# Patient Record
Sex: FEMALE | Race: WHITE | NOT HISPANIC OR LATINO | Employment: OTHER
[De-identification: names, ages, dates, MRNs, and addresses within clinical notes are randomized per-mention and may not be internally consistent; named-entity substitution may affect disease eponyms.]

---

## 2019-12-08 ENCOUNTER — HEALTH MAINTENANCE LETTER (OUTPATIENT)
Age: 79
End: 2019-12-08

## 2020-03-15 ENCOUNTER — HEALTH MAINTENANCE LETTER (OUTPATIENT)
Age: 80
End: 2020-03-15

## 2021-01-10 ENCOUNTER — HEALTH MAINTENANCE LETTER (OUTPATIENT)
Age: 81
End: 2021-01-10

## 2021-04-14 ENCOUNTER — TELEPHONE (OUTPATIENT)
Dept: FAMILY MEDICINE | Facility: CLINIC | Age: 81
End: 2021-04-14

## 2021-04-14 NOTE — TELEPHONE ENCOUNTER
"Pt said she has some different kind of chest pain,discomfort. Also c/o some dizziness.     Pt has emphysema. Reviewed chart with pt. Was seen by cardiology last in .   Pt denies chest pain, is SOB from \"my lungs\", denies tachycardia.     Fam hx: mother  of MI at age 98!  Pts son had MI at age 40 (his father had CAD)  Noted CT chest showed atherosclerosis- pt stated that was when she referred to cardiology in 2017- no follow up, statin recommended- but pt took for a short time and stopped.   Pt stated she is just not  feeling quite right. - does not think it is her lungs, its different. She has fatigue, dizziness.    Pt has not checked blood pressure with dizziness or regularly.      2017: Cardiology consult: Dr Osvaldo Cotter: in care everywhere.      CORONARY CTA, 2017    INDICATIONS:  Chest pain, evaluate.  Risk Factors: FH: yes.  HTN: yes.    Hyperlipidemia: no.  Diabetes: no.  Obesity: mild.    PLAQUE TYPE:  Hard: Calcium Score = 333, 79th percentile for matched age   and sex.     CORONARY ANATOMY:  (Right Dominant)    LEFT MAIN:  There is moderate distal left main calcification.  There is no   stenosis.    LEFT ANTERIOR DESCENDING:  Diffuse calcification with no stenosis.    FIRST DIAGONAL:  No stenoses.     SECOND DIAGONAL:  No stenoses.       CIRCUMFLEX:  Nondominant.  No stenosis.    FIRST OBTUSE MARGINAL:  No stenoses.     SECOND OBTUSE MARGINAL:  No stenoses.       RIGHT CORONARY ARTERY:  Dominant.  Diffuse calcification.  Careful review   of the right coronary artery shows a mild to moderate mid stenosis before   the right ventricular branch.      AORTA:  Proximal ascending aorta, mid-through distal descending thoracic   aorta is normal.    PERICARDIUM:  Normal thickness and without an effusion.     2015: 48 hour holter- was WNL  2015: Stress echo: - was WNL  2013: Echo- WNL    Pt did see pcp on 21- communicated these issues. Labs done, chest xray- see Care everywhere ([facility])- no " changes in plan of care. Pt scheduling with cardiology for her concerns.     Scheduled first available with Dr Mansfield 5/5/21. Pt stated she was okay waiting for this appt.   Discussed symptoms to watch for: if chest pain, back pain, JEAN BAPTISTE, tachycardia- especially with exertion she should call 911, go to ED.   Pt agreed and verbalized understanding

## 2021-04-14 NOTE — TELEPHONE ENCOUNTER
Pt reports she would like an appt with a cardiologist due to some chest and light headedness. Please call her back at [phone] to schedule

## 2021-04-15 ENCOUNTER — TELEPHONE (OUTPATIENT)
Dept: CARDIOLOGY | Facility: CLINIC | Age: 81
End: 2021-04-15

## 2021-04-15 DIAGNOSIS — I25.10 CORONARY ARTERY CALCIFICATION SEEN ON CT SCAN: Primary | ICD-10-CM

## 2021-04-15 DIAGNOSIS — R07.9 CHEST PAIN: ICD-10-CM

## 2021-04-15 DIAGNOSIS — I10 HTN (HYPERTENSION): ICD-10-CM

## 2021-04-15 NOTE — TELEPHONE ENCOUNTER
Reviewed hx with Dr Mansfield. Would like an echocardiogram prior to clinic consult.    Order placed. Will call to schedule

## 2021-04-16 NOTE — TELEPHONE ENCOUNTER
Left a message for patient to call back to schedule Echo prior to Dr. Mansfield appointment 5/5/2021.    YARELI Rothman

## 2021-04-30 ENCOUNTER — ANCILLARY PROCEDURE (OUTPATIENT)
Dept: CARDIOLOGY | Facility: CLINIC | Age: 81
End: 2021-04-30
Payer: MEDICARE

## 2021-04-30 DIAGNOSIS — I10 HTN (HYPERTENSION): ICD-10-CM

## 2021-04-30 DIAGNOSIS — I25.10 CORONARY ARTERY CALCIFICATION SEEN ON CT SCAN: ICD-10-CM

## 2021-04-30 DIAGNOSIS — R07.9 CHEST PAIN: ICD-10-CM

## 2021-04-30 PROCEDURE — 93306 TTE W/DOPPLER COMPLETE: CPT | Performed by: INTERNAL MEDICINE

## 2021-05-05 ENCOUNTER — OFFICE VISIT (OUTPATIENT)
Dept: CARDIOLOGY | Facility: CLINIC | Age: 81
End: 2021-05-05
Payer: MEDICARE

## 2021-05-05 VITALS
BODY MASS INDEX: 27.88 KG/M2 | DIASTOLIC BLOOD PRESSURE: 75 MMHG | OXYGEN SATURATION: 98 % | HEART RATE: 70 BPM | SYSTOLIC BLOOD PRESSURE: 136 MMHG | WEIGHT: 150 LBS

## 2021-05-05 DIAGNOSIS — J43.9 PULMONARY EMPHYSEMA, UNSPECIFIED EMPHYSEMA TYPE (H): ICD-10-CM

## 2021-05-05 DIAGNOSIS — Z87.891 FORMER SMOKER: ICD-10-CM

## 2021-05-05 DIAGNOSIS — R07.89 CHEST DISCOMFORT: Primary | ICD-10-CM

## 2021-05-05 PROCEDURE — 99205 OFFICE O/P NEW HI 60 MIN: CPT | Performed by: INTERNAL MEDICINE

## 2021-05-05 PROCEDURE — 93000 ELECTROCARDIOGRAM COMPLETE: CPT | Performed by: INTERNAL MEDICINE

## 2021-05-05 RX ORDER — CELECOXIB 200 MG/1
200 CAPSULE ORAL
COMMUNITY
Start: 2019-09-17 | End: 2022-05-19

## 2021-05-05 RX ORDER — PREDNISONE 5 MG/1
TABLET ORAL
COMMUNITY
Start: 2021-04-06 | End: 2022-01-10

## 2021-05-05 RX ORDER — TRAMADOL HYDROCHLORIDE 50 MG/1
TABLET ORAL
COMMUNITY
Start: 2021-04-05 | End: 2022-12-19

## 2021-05-05 RX ORDER — BUDESONIDE 0.5 MG/2ML
0.5 INHALANT ORAL
COMMUNITY
Start: 2019-10-01 | End: 2022-01-10

## 2021-05-05 NOTE — NURSING NOTE
"Chief Complaint   Patient presents with     New Patient     Dr Mansfield , new pt. Hx of COPD, intermittent CP, 2017- CTA- Diffuse ds RCA, moderate Left Main. Hx hypertension, hypothyroid.      Chest Pain     Hypertension       Initial BP (!) 160/83 (BP Location: Left arm, Patient Position: Chair, Cuff Size: Adult Regular)   Pulse 76   Wt 68 kg (150 lb)   SpO2 98%   BMI 27.88 kg/m   Estimated body mass index is 27.88 kg/m  as calculated from the following:    Height as of 6/15/16: 1.562 m (5' 1.5\").    Weight as of this encounter: 68 kg (150 lb)..  BP completed using cuff size: regular    Cindy Fam MA  "

## 2021-05-05 NOTE — LETTER
5/5/2021      RE: Yvonne Maria  [address] [address]       Dear Colleague,    Thank you for the opportunity to participate in the care of your patient, Yvonne Maria, at the [facility] [facility]. Please see a copy of my visit note below.    Referring provider: Self-referred    Chief complaint: Chest discomfort    HPI: Ms. Yvonne Maria is a 80 year old  female with PMH significant for    -COPD (panlobular emphysema) on oxygen at night and inhalers  -Pulmonary nodules  -Hypertension, well controlled  -Probable polymyalgia rheumatica  -Ménière's disease  -Celiac disease  -Former smoker    Patient is being seen today for chest discomfort over the last 6 to 8 months.  Chest discomfort is around the epigastric and lower midsternal chest.  The discomfort radiates to her back.  Symptom occurs both at rest and with exertion mainly with stairs.  She tells me that she feels the discomfort while sitting in clinic right now.  It is mild.  Does not happen every day.  Sometimes associated with sweating but denies nausea.  Last for 20 to 30 minutes.  She tells me that she herself has decided to see cardiology, GI and pulmonology for this particular symptom.  She is scheduled for upper endoscopy on 5/18.    Patient was evaluated in cardiology at [facility] in 2017 for similar symptoms.  She had CT coronary angiogram in 2017 which showed elevated coronary artery calcium score at 333 with no obstructive CAD.  She was started on statin (she recalls the dose was 40 mg but cannot recall the name) which she could not tolerate and stopped.  Patient expressed her unpleasant memory about this because she tells me that the statin was never discussed with her before she was started on this treatment.    Patient has history of Ménière's and she tells me that she was started on triamterene hydrochlorothiazide initially 2 tablets a day.   She has made significant lifestyle changes.  She decreased salt intake and she was able to lower the dose of the medication to once daily now.    She has history of hiatal hernia surgery in 2019. She has COPD currently on inhalers and oxygen at night. Patient has seen rheumatology in the past for suspicious temporal arteritis.  Temporal artery biopsy was unremarkable.  She was on steroids for polymyalgia rheumatica. She tells me that she has celiac disease currently well controlled with diet.    No history of diabetes.  She has hyperlipidemia currently not on treatment.      Patient has 27-pack-year history of smoking.  Quit date 1986.    Current medications are triamterene hydrochlorothiazide 1 tablet a day, aspirin 81 mg, potassium supplement, prednisone 5 mg once daily.    Medications, personal, family, and social history reviewed with patient and revised.    PAST MEDICAL HISTORY:  History reviewed. No pertinent past medical history.    CURRENT MEDICATIONS:  Current Outpatient Medications   Medication Sig Dispense Refill     albuterol (PROAIR HFA, PROVENTIL HFA, VENTOLIN HFA) 108 (90 BASE) MCG/ACT inhaler Inhale 2 puffs into the lungs every 6 hours as needed for shortness of breath / dyspnea or wheezing 1 Inhaler 1     Albuterol Sulfate 108 (90 BASE) MCG/ACT AEPB Inhale 1-2 puffs into the lungs every 4 hours as needed       aspirin 81 MG tablet Take 81 mg by mouth daily       cyanocobalamin (VITAMIN B12) 1000 MCG/ML injection Inject 1 mL into the muscle every 30 days       DULoxetine (CYMBALTA) 30 MG capsule Take 1 capsule (30 mg) by mouth 2 times daily 180 capsule 1     fluticasone - vilanterol (BREO ELLIPTA) 100-25 MCG/INH oral inhaler Inhale 1 puff into the lungs daily       guaiFENesin-codeine (ROBITUSSIN AC) 100-10 MG/5ML SOLN Take 5 mLs by mouth every 6 hours as needed for cough 120 mL 0     hydrocortisone valerate (WEST-YVROSE) 0.2 % ointment Apply topically as needed       levothyroxine  (SYNTHROID,LEVOTHROID) 100 MCG tablet Take 1 tablet (100 mcg) by mouth daily 90 tablet 3     potassium chloride SA (K-DUR,KLOR-CON M) 20 MEQ tablet   3     ranitidine (ZANTAC) 75 MG tablet Take 75 mg by mouth daily       triamterene-hydrochlorothiazide (MAXZIDE-25) 37.5-25 MG per tablet Take 1 tablet by mouth daily 90 tablet 3       PAST SURGICAL HISTORY:  No past surgical history on file.    ALLERGIES:     Allergies   Allergen Reactions     Lyrica [Pregabalin] Swelling     Tramadol        FAMILY HISTORY:  Family History   Problem Relation Age of Onset     Myocardial Infarction Father      Heart Disease Maternal Grandmother      Lymphoma Maternal Grandfather      Heart Disease Paternal Grandmother      Myocardial Infarction Son          SOCIAL HISTORY:  Social History     Tobacco Use     Smoking status: Former Smoker     Types: Cigarettes     Quit date: 3/28/1986     Years since quittin.1     Smokeless tobacco: Never Used   Substance Use Topics     Alcohol use: Yes     Alcohol/week: 0.0 standard drinks     Comment: Wine 5-7 glasses per week     Drug use: No       ROS:   Constitutional: No fever, chills, or sweats. Weight stable.   ENT: No visual disturbance, ear ache, epistaxis, sore throat.   Cardiovascular: As per HPI.   Respiratory: No cough, hemoptysis.    GI: No nausea, vomiting, hematemesis, melena, or hematochezia.   : No hematuria.   Integument: Negative.   Psychiatric: Negative.   Hematologic:  No easy bruising, no easy bleeding.  Neuro: Negative.   Endocrinology: No significant heat or cold intolerance   Musculoskeletal: No myalgia.    Exam:  /75   Pulse 70   Wt 68 kg (150 lb)   SpO2 98%   BMI 27.88 kg/m    GENERAL APPEARANCE: alert and no distress  HEENT: no icterus, no central cyanosis  LYMPH/NECK: no adenopathy, no asymmetry, JVP not elevated, no carotid bruits.  RESPIRATORY: lungs clear to auscultation - no rales, rhonchi or wheezes, no use of accessory muscles, no retractions,  respirations are unlabored, normal respiratory rate  CARDIOVASCULAR: regular rhythm, normal S1, S2, no S3 or S4 and no murmur, click or rub, precordium quiet with normal PMI.  GI: soft, non tender  EXTREMITIES: peripheral pulses normal, no edema  NEURO: alert, normal speech,and affect  VASC: Radial, dorsalis pedis and posterior tibialis pulses are normal in volumes and symmetric bilaterally.   SKIN: no ecchymoses, no rashes     I have reviewed the labs and personally reviewed the imaging below and made my comment in the assessment and plan.    Labs:  CBC RESULTS:   Lab Results   Component Value Date    WBC 10.9 06/13/2016    RBC 4.69 06/13/2016    HGB 13.8 06/13/2016    HCT 41.1 06/13/2016    MCV 88 06/13/2016    MCH 29.4 06/13/2016    MCHC 33.6 06/13/2016    RDW 14.9 06/13/2016     06/13/2016       BMP RESULTS:  Lab Results   Component Value Date     05/02/2016    POTASSIUM 3.5 05/02/2016    CHLORIDE 97 05/02/2016    CO2 30 05/02/2016    ANIONGAP 9 05/02/2016     (H) 05/02/2016    BUN 15 05/02/2016    CR 0.89 05/02/2016    GFRESTIMATED 62 05/02/2016    GFRESTBLACK 75 05/02/2016    ARTURO 9.3 05/02/2016     CT coronary angiogram 8/9/2017 [facility]    PLAQUE TYPE:  Hard: Calcium Score = 333, 79th percentile for matched age   and sex.     SCAN QUALITY: Good.    FINDINGS:    CORONARY ANATOMY:  (Right Dominant)    LEFT MAIN:  There is moderate distal left main calcification.  There is no   stenosis.    LEFT ANTERIOR DESCENDING:  Diffuse calcification with no stenosis.    FIRST DIAGONAL:  No stenoses.     SECOND DIAGONAL:  No stenoses.       CIRCUMFLEX:  Nondominant.  No stenosis.    FIRST OBTUSE MARGINAL:  No stenoses.     SECOND OBTUSE MARGINAL:  No stenoses.       RIGHT CORONARY ARTERY:  Dominant.  Diffuse calcification.  Careful review   of the right coronary artery shows a mild to moderate mid stenosis before   the right ventricular branch.      AORTA:  Proximal ascending aorta, mid-through  distal descending thoracic   aorta is normal.    PERICARDIUM:  Normal thickness and without an effusion.    Exercise Stress echocardiogram [facility] 5/16/2013   1. LV function is normal. The visually estimated ejection fraction is 60%.        2. No obvious wall motion abnormalities, however endocardial definition is       limited.                                                                           3. Normal right ventricular size and systolic function.                           4. No hemodynamically significant valvular disease.                               5. No evidence of pulmonary hypertension. The estimated pulmonary artery         systolic pressure is normal at 20.6 mmHg plus right atrial pressure.               6. No pericardial effusion.       EKG personally reviewed in clinic sinus rhythm otherwise normal.    Assessment and Plan:     #Exertional and nonexertional chest discomfort over the last 6 months radiating to back  #Former smoker  #Elevated coronary artery calcium score with nonobstructive CAD (CTA coronary angiogram 2017 result available in Care Everywhere)  -I discussed with the patient stress test versus repeating CT coronary angiogram.  She prefers to repeat CT coronary angiogram to make sure she has no obstructive coronary disease leading to her current symptoms.  I will repeat her labs including BMP and lipid today.  Patient is aware of contrast related side effects including allergies and contrast-induced nephropathy.  She is agreeable to proceed.  -Continue aspirin 81 mg daily  -Was not able to tolerate statins in the past.  Patient is willing to try a different statin.  Will await results of current CT angiogram.    #History of hiatal hernia surgery in 2019  -Scheduled for upper endoscopy 5/18    #COPD on home oxygen and inhalers  -Patient requests referral to pulmonology to establish care  -Referral placed    Return to clinic pending CTA result.    No medication changes  today.    Total time spent today for this visit is 60 minutes including precharting, face-to-face clinic visit, review of labs/imaging, review of outside hospital medical records and medical documentation.    Please donot hesitate to contact me if you have any questions or concerns. Again, thank you for allowing me to participate in the care of your patient.    Rachana QUINN MD  [facility] Division of Cardiology  Pager [phone]    Addendum note 5/6/2021:  Labs showed CKD stage III, mild elevated LDL at 121 and normal CBC.  We will proceed with CT coronary angiogram.         Please do not hesitate to contact me if you have any questions/concerns.     Sincerely,     Rachana Quinn MD

## 2021-05-05 NOTE — PROGRESS NOTES
Referring provider: Self-referred    Chief complaint: Chest discomfort    HPI: Ms. Yvonne Maria is a 80 year old  female with PMH significant for    -COPD (panlobular emphysema) on oxygen at night and inhalers  -Pulmonary nodules  -Hypertension, well controlled  -Probable polymyalgia rheumatica  -Ménière's disease  -Celiac disease  -Former smoker    Patient is being seen today for chest discomfort over the last 6 to 8 months.  Chest discomfort is around the epigastric and lower midsternal chest.  The discomfort radiates to her back.  Symptom occurs both at rest and with exertion mainly with stairs.  She tells me that she feels the discomfort while sitting in clinic right now.  It is mild.  Does not happen every day.  Sometimes associated with sweating but denies nausea.  Last for 20 to 30 minutes.  She tells me that she herself has decided to see cardiology, GI and pulmonology for this particular symptom.  She is scheduled for upper endoscopy on 5/18.    Patient was evaluated in cardiology at [facility] in 2017 for similar symptoms.  She had CT coronary angiogram in 2017 which showed elevated coronary artery calcium score at 333 with no obstructive CAD.  She was started on statin (she recalls the dose was 40 mg but cannot recall the name) which she could not tolerate and stopped.  Patient expressed her unpleasant memory about this because she tells me that the statin was never discussed with her before she was started on this treatment.    Patient has history of Ménière's and she tells me that she was started on triamterene hydrochlorothiazide initially 2 tablets a day.  She has made significant lifestyle changes.  She decreased salt intake and she was able to lower the dose of the medication to once daily now.    She has history of hiatal hernia surgery in 2019. She has COPD currently on inhalers and oxygen at night. Patient has seen rheumatology in the past for suspicious temporal arteritis.  Temporal  artery biopsy was unremarkable.  She was on steroids for polymyalgia rheumatica. She tells me that she has celiac disease currently well controlled with diet.    No history of diabetes.  She has hyperlipidemia currently not on treatment.      Patient has 27-pack-year history of smoking.  Quit date 1986.    Current medications are triamterene hydrochlorothiazide 1 tablet a day, aspirin 81 mg, potassium supplement, prednisone 5 mg once daily.    Medications, personal, family, and social history reviewed with patient and revised.    PAST MEDICAL HISTORY:  History reviewed. No pertinent past medical history.    CURRENT MEDICATIONS:  Current Outpatient Medications   Medication Sig Dispense Refill     albuterol (PROAIR HFA, PROVENTIL HFA, VENTOLIN HFA) 108 (90 BASE) MCG/ACT inhaler Inhale 2 puffs into the lungs every 6 hours as needed for shortness of breath / dyspnea or wheezing 1 Inhaler 1     Albuterol Sulfate 108 (90 BASE) MCG/ACT AEPB Inhale 1-2 puffs into the lungs every 4 hours as needed       aspirin 81 MG tablet Take 81 mg by mouth daily       cyanocobalamin (VITAMIN B12) 1000 MCG/ML injection Inject 1 mL into the muscle every 30 days       DULoxetine (CYMBALTA) 30 MG capsule Take 1 capsule (30 mg) by mouth 2 times daily 180 capsule 1     fluticasone - vilanterol (BREO ELLIPTA) 100-25 MCG/INH oral inhaler Inhale 1 puff into the lungs daily       guaiFENesin-codeine (ROBITUSSIN AC) 100-10 MG/5ML SOLN Take 5 mLs by mouth every 6 hours as needed for cough 120 mL 0     hydrocortisone valerate (WEST-YVROSE) 0.2 % ointment Apply topically as needed       levothyroxine (SYNTHROID,LEVOTHROID) 100 MCG tablet Take 1 tablet (100 mcg) by mouth daily 90 tablet 3     potassium chloride SA (K-DUR,KLOR-CON M) 20 MEQ tablet   3     ranitidine (ZANTAC) 75 MG tablet Take 75 mg by mouth daily       triamterene-hydrochlorothiazide (MAXZIDE-25) 37.5-25 MG per tablet Take 1 tablet by mouth daily 90 tablet 3       PAST SURGICAL  HISTORY:  No past surgical history on file.    ALLERGIES:     Allergies   Allergen Reactions     Lyrica [Pregabalin] Swelling     Tramadol        FAMILY HISTORY:  Family History   Problem Relation Age of Onset     Myocardial Infarction Father      Heart Disease Maternal Grandmother      Lymphoma Maternal Grandfather      Heart Disease Paternal Grandmother      Myocardial Infarction Son          SOCIAL HISTORY:  Social History     Tobacco Use     Smoking status: Former Smoker     Types: Cigarettes     Quit date: 3/28/1986     Years since quittin.1     Smokeless tobacco: Never Used   Substance Use Topics     Alcohol use: Yes     Alcohol/week: 0.0 standard drinks     Comment: Wine 5-7 glasses per week     Drug use: No       ROS:   Constitutional: No fever, chills, or sweats. Weight stable.   ENT: No visual disturbance, ear ache, epistaxis, sore throat.   Cardiovascular: As per HPI.   Respiratory: No cough, hemoptysis.    GI: No nausea, vomiting, hematemesis, melena, or hematochezia.   : No hematuria.   Integument: Negative.   Psychiatric: Negative.   Hematologic:  No easy bruising, no easy bleeding.  Neuro: Negative.   Endocrinology: No significant heat or cold intolerance   Musculoskeletal: No myalgia.    Exam:  /75   Pulse 70   Wt 68 kg (150 lb)   SpO2 98%   BMI 27.88 kg/m    GENERAL APPEARANCE: alert and no distress  HEENT: no icterus, no central cyanosis  LYMPH/NECK: no adenopathy, no asymmetry, JVP not elevated, no carotid bruits.  RESPIRATORY: lungs clear to auscultation - no rales, rhonchi or wheezes, no use of accessory muscles, no retractions, respirations are unlabored, normal respiratory rate  CARDIOVASCULAR: regular rhythm, normal S1, S2, no S3 or S4 and no murmur, click or rub, precordium quiet with normal PMI.  GI: soft, non tender  EXTREMITIES: peripheral pulses normal, no edema  NEURO: alert, normal speech,and affect  VASC: Radial, dorsalis pedis and posterior tibialis pulses are normal  in volumes and symmetric bilaterally.   SKIN: no ecchymoses, no rashes     I have reviewed the labs and personally reviewed the imaging below and made my comment in the assessment and plan.    Labs:  CBC RESULTS:   Lab Results   Component Value Date    WBC 10.9 06/13/2016    RBC 4.69 06/13/2016    HGB 13.8 06/13/2016    HCT 41.1 06/13/2016    MCV 88 06/13/2016    MCH 29.4 06/13/2016    MCHC 33.6 06/13/2016    RDW 14.9 06/13/2016     06/13/2016       BMP RESULTS:  Lab Results   Component Value Date     05/02/2016    POTASSIUM 3.5 05/02/2016    CHLORIDE 97 05/02/2016    CO2 30 05/02/2016    ANIONGAP 9 05/02/2016     (H) 05/02/2016    BUN 15 05/02/2016    CR 0.89 05/02/2016    GFRESTIMATED 62 05/02/2016    GFRESTBLACK 75 05/02/2016    ARTURO 9.3 05/02/2016     CT coronary angiogram 8/9/2017 [employer]    PLAQUE TYPE:  Hard: Calcium Score = 333, 79th percentile for matched age   and sex.     SCAN QUALITY: Good.    FINDINGS:    CORONARY ANATOMY:  (Right Dominant)    LEFT MAIN:  There is moderate distal left main calcification.  There is no   stenosis.    LEFT ANTERIOR DESCENDING:  Diffuse calcification with no stenosis.    FIRST DIAGONAL:  No stenoses.     SECOND DIAGONAL:  No stenoses.       CIRCUMFLEX:  Nondominant.  No stenosis.    FIRST OBTUSE MARGINAL:  No stenoses.     SECOND OBTUSE MARGINAL:  No stenoses.       RIGHT CORONARY ARTERY:  Dominant.  Diffuse calcification.  Careful review   of the right coronary artery shows a mild to moderate mid stenosis before   the right ventricular branch.      AORTA:  Proximal ascending aorta, mid-through distal descending thoracic   aorta is normal.    PERICARDIUM:  Normal thickness and without an effusion.    Exercise Stress echocardiogram [facility] 5/16/2013   1. LV function is normal. The visually estimated ejection fraction is 60%.        2. No obvious wall motion abnormalities, however endocardial definition is       limited.                                                                            3. Normal right ventricular size and systolic function.                           4. No hemodynamically significant valvular disease.                               5. No evidence of pulmonary hypertension. The estimated pulmonary artery         systolic pressure is normal at 20.6 mmHg plus right atrial pressure.               6. No pericardial effusion.       EKG personally reviewed in clinic sinus rhythm otherwise normal.    Assessment and Plan:     #Exertional and nonexertional chest discomfort over the last 6 months radiating to back  #Former smoker  #Elevated coronary artery calcium score with nonobstructive CAD (CTA coronary angiogram 2017 result available in Care Everywhere)  -I discussed with the patient stress test versus repeating CT coronary angiogram.  She prefers to repeat CT coronary angiogram to make sure she has no obstructive coronary disease leading to her current symptoms.  I will repeat her labs including BMP and lipid today.  Patient is aware of contrast related side effects including allergies and contrast-induced nephropathy.  She is agreeable to proceed.  -Continue aspirin 81 mg daily  -Was not able to tolerate statins in the past.  Patient is willing to try a different statin.  Will await results of current CT angiogram.    #History of hiatal hernia surgery in 2019  -Scheduled for upper endoscopy 5/18    #COPD on home oxygen and inhalers  -Patient requests referral to pulmonology to establish care  -Referral placed    Return to clinic pending CTA result.    No medication changes today.    Total time spent today for this visit is 60 minutes including precharting, face-to-face clinic visit, review of labs/imaging, review of outside hospital medical records and medical documentation.    Please donot hesitate to contact me if you have any questions or concerns. Again, thank you for allowing me to participate in the care of your  patient.    Rachana QUINN MD  [facility] Division of Cardiology  Pager [phone]    Addendum note 5/6/2021:  Labs showed CKD stage III, mild elevated LDL at 121 and normal CBC.  We will proceed with CT coronary angiogram.       Addendum note 5/12/2021:    CT coronary angiogram 5/12/2021  IMPRESSION:  1.  Severe mid RCA stenosis, and moderate stenosis in the proximal to  mid LAD. Results conveyed to referring provider.  2.  Total Agatston score 491 placing the patient in the 81 percentile  when compared to age and gender matched control group.    I communicated the result of CTA with the patient on the phone today.  Patient reported that dyspnea on exertion has particularly gotten worse over the last 3 months.  This is highly likely due to severe coronary artery disease particularly severe mid RCA stenosis.  Recommend coronary angiogram.Risks of coronary angiogram, including but not limited to, death, MI, stroke, emergent bypass, bleeding and contrast related kidney injury were discussed and patient is agreeable to proceed.    I recommended her to start on baby aspirin 81 mg and Crestor 10 mg.

## 2021-05-05 NOTE — PATIENT INSTRUCTIONS
Thank you for coming to the [facility] @ [facility]; please note the following instructions:    1. EKG today    2. Labs today    3. CT angiogram    4. Pulmonology referral     5. Follow up as needed         If you have any questions regarding your visit please contact your care team:     Cardiology  Telephone Number   Nadira SHORE, RN  Chloe TAYLOR, RN   Nicole QUINTERO, RMA  Cindy RICHARDSON, RMA  Ronal GUPTA, LPN   [phone] (option 1)   For scheduling appts:     [phone] (select option 1)       For the Device Clinic (Pacemakers and ICD's)  RN's :  Joyce Burns   During business hours: [phone]    *After business hours:  [phone] (select option 4)      Normal test result notifications will be released via [employer] or mailed within 7 business days.  All other test results, will be communicated via telephone once reviewed by your cardiologist.    If you need a medication refill please contact your pharmacy.  Please allow 3 business days for your refill to be completed.    As always, thank you for trusting us with your health care needs!

## 2021-05-06 DIAGNOSIS — R07.89 CHEST DISCOMFORT: ICD-10-CM

## 2021-05-06 LAB
ANION GAP SERPL CALCULATED.3IONS-SCNC: 5 MMOL/L (ref 3–14)
BUN SERPL-MCNC: 18 MG/DL (ref 7–30)
CALCIUM SERPL-MCNC: 9 MG/DL (ref 8.5–10.1)
CHLORIDE SERPL-SCNC: 105 MMOL/L (ref 94–109)
CHOLEST SERPL-MCNC: 225 MG/DL
CO2 SERPL-SCNC: 31 MMOL/L (ref 20–32)
CREAT SERPL-MCNC: 1.16 MG/DL (ref 0.52–1.04)
ERYTHROCYTE [DISTWIDTH] IN BLOOD BY AUTOMATED COUNT: 14.9 % (ref 10–15)
GFR SERPL CREATININE-BSD FRML MDRD: 45 ML/MIN/{1.73_M2}
GLUCOSE SERPL-MCNC: 91 MG/DL (ref 70–99)
HCT VFR BLD AUTO: 40.7 % (ref 35–47)
HDLC SERPL-MCNC: 77 MG/DL
HGB BLD-MCNC: 13.1 G/DL (ref 11.7–15.7)
LDLC SERPL CALC-MCNC: 121 MG/DL
MCH RBC QN AUTO: 28.8 PG (ref 26.5–33)
MCHC RBC AUTO-ENTMCNC: 32.2 G/DL (ref 31.5–36.5)
MCV RBC AUTO: 90 FL (ref 78–100)
NONHDLC SERPL-MCNC: 148 MG/DL
PLATELET # BLD AUTO: 289 10E9/L (ref 150–450)
POTASSIUM SERPL-SCNC: 4 MMOL/L (ref 3.4–5.3)
RBC # BLD AUTO: 4.55 10E12/L (ref 3.8–5.2)
SODIUM SERPL-SCNC: 141 MMOL/L (ref 133–144)
TRIGL SERPL-MCNC: 134 MG/DL
WBC # BLD AUTO: 6.8 10E9/L (ref 4–11)

## 2021-05-06 PROCEDURE — 80048 BASIC METABOLIC PNL TOTAL CA: CPT | Performed by: INTERNAL MEDICINE

## 2021-05-06 PROCEDURE — 36415 COLL VENOUS BLD VENIPUNCTURE: CPT | Performed by: INTERNAL MEDICINE

## 2021-05-06 PROCEDURE — 80061 LIPID PANEL: CPT | Performed by: INTERNAL MEDICINE

## 2021-05-06 PROCEDURE — 85027 COMPLETE CBC AUTOMATED: CPT | Performed by: INTERNAL MEDICINE

## 2021-05-08 ENCOUNTER — HEALTH MAINTENANCE LETTER (OUTPATIENT)
Age: 81
End: 2021-05-08

## 2021-05-12 ENCOUNTER — TELEPHONE (OUTPATIENT)
Dept: CARDIOLOGY | Facility: CLINIC | Age: 81
End: 2021-05-12

## 2021-05-12 ENCOUNTER — HOSPITAL ENCOUNTER (OUTPATIENT)
Dept: CT IMAGING | Facility: CLINIC | Age: 81
Discharge: HOME | End: 2021-05-12
Attending: INTERNAL MEDICINE | Admitting: INTERNAL MEDICINE
Payer: MEDICARE

## 2021-05-12 VITALS
RESPIRATION RATE: 16 BRPM | OXYGEN SATURATION: 97 % | DIASTOLIC BLOOD PRESSURE: 65 MMHG | SYSTOLIC BLOOD PRESSURE: 132 MMHG | HEART RATE: 62 BPM

## 2021-05-12 DIAGNOSIS — R07.89 CHEST DISCOMFORT: ICD-10-CM

## 2021-05-12 DIAGNOSIS — I10 HTN (HYPERTENSION): ICD-10-CM

## 2021-05-12 DIAGNOSIS — E78.5 HYPERLIPIDEMIA LDL GOAL <70: ICD-10-CM

## 2021-05-12 DIAGNOSIS — R07.89 CHEST DISCOMFORT: Primary | ICD-10-CM

## 2021-05-12 PROCEDURE — 75574 CT ANGIO HRT W/3D IMAGE: CPT | Mod: 26 | Performed by: INTERNAL MEDICINE

## 2021-05-12 PROCEDURE — 999N000127 HC STATISTIC PERIPHERAL IV START W US GUIDANCE

## 2021-05-12 PROCEDURE — G1004 CDSM NDSC: HCPCS | Performed by: INTERNAL MEDICINE

## 2021-05-12 PROCEDURE — 250N000011 HC RX IP 250 OP 636: Performed by: INTERNAL MEDICINE

## 2021-05-12 PROCEDURE — 250N000013 HC RX MED GY IP 250 OP 250 PS 637: Performed by: INTERNAL MEDICINE

## 2021-05-12 PROCEDURE — 75574 CT ANGIO HRT W/3D IMAGE: CPT | Mod: MG

## 2021-05-12 RX ORDER — NITROGLYCERIN 0.4 MG/1
.4-.8 TABLET SUBLINGUAL
Status: DISCONTINUED | OUTPATIENT
Start: 2021-05-12 | End: 2021-05-13 | Stop reason: HOSPADM

## 2021-05-12 RX ORDER — DIPHENHYDRAMINE HYDROCHLORIDE 50 MG/ML
25-50 INJECTION INTRAMUSCULAR; INTRAVENOUS
Status: DISCONTINUED | OUTPATIENT
Start: 2021-05-12 | End: 2021-05-13 | Stop reason: HOSPADM

## 2021-05-12 RX ORDER — METOPROLOL TARTRATE 25 MG/1
25-100 TABLET, FILM COATED ORAL
Status: COMPLETED | OUTPATIENT
Start: 2021-05-12 | End: 2021-05-12

## 2021-05-12 RX ORDER — IVABRADINE 5 MG/1
5-15 TABLET, FILM COATED ORAL
Status: COMPLETED | OUTPATIENT
Start: 2021-05-12 | End: 2021-05-12

## 2021-05-12 RX ORDER — DIPHENHYDRAMINE HCL 25 MG
25 CAPSULE ORAL
Status: DISCONTINUED | OUTPATIENT
Start: 2021-05-12 | End: 2021-05-13 | Stop reason: HOSPADM

## 2021-05-12 RX ORDER — ROSUVASTATIN CALCIUM 10 MG/1
10 TABLET, COATED ORAL DAILY
Qty: 90 TABLET | Refills: 3 | Status: ON HOLD | OUTPATIENT
Start: 2021-05-12 | End: 2021-05-28

## 2021-05-12 RX ORDER — IOPAMIDOL 755 MG/ML
120 INJECTION, SOLUTION INTRAVASCULAR ONCE
Status: COMPLETED | OUTPATIENT
Start: 2021-05-12 | End: 2021-05-12

## 2021-05-12 RX ORDER — ACYCLOVIR 200 MG/1
0-1 CAPSULE ORAL
Status: DISCONTINUED | OUTPATIENT
Start: 2021-05-12 | End: 2021-05-13 | Stop reason: HOSPADM

## 2021-05-12 RX ORDER — ONDANSETRON 2 MG/ML
4 INJECTION INTRAMUSCULAR; INTRAVENOUS
Status: DISCONTINUED | OUTPATIENT
Start: 2021-05-12 | End: 2021-05-13 | Stop reason: HOSPADM

## 2021-05-12 RX ORDER — METOPROLOL TARTRATE 1 MG/ML
5-15 INJECTION, SOLUTION INTRAVENOUS
Status: DISCONTINUED | OUTPATIENT
Start: 2021-05-12 | End: 2021-05-13 | Stop reason: HOSPADM

## 2021-05-12 RX ORDER — METHYLPREDNISOLONE SODIUM SUCCINATE 125 MG/2ML
125 INJECTION, POWDER, LYOPHILIZED, FOR SOLUTION INTRAMUSCULAR; INTRAVENOUS
Status: DISCONTINUED | OUTPATIENT
Start: 2021-05-12 | End: 2021-05-13 | Stop reason: HOSPADM

## 2021-05-12 RX ADMIN — NITROGLYCERIN 0.8 MG: 0.4 TABLET SUBLINGUAL at 13:41

## 2021-05-12 RX ADMIN — METOPROLOL TARTRATE 50 MG: 50 TABLET, FILM COATED ORAL at 12:08

## 2021-05-12 RX ADMIN — IVABRADINE 10 MG: 5 TABLET, FILM COATED ORAL at 12:08

## 2021-05-12 RX ADMIN — IOPAMIDOL 110 ML: 755 INJECTION, SOLUTION INTRAVENOUS at 13:41

## 2021-05-12 NOTE — LETTER
May 13, 2021      TO: Yvonne Maria  [address] [facility] [address]               Dear Yvonne,    You are scheduled for a Coronary Angiogram at the [facility], [address] [place] [address], [place] [address].   Please arrive to the [facility] Waiting Room on ___Friday, May 28th_______  at _____11:00 am______.       You will need to undergo a COVID-19 PCR swab test within 48-72 hours prior to the  procedure. You will receive a phone call with more information. If you do not hear from the [facility] scheduling team, please call: [phone] to schedule.      When you arrive you will be escorted back to the pre procedure area called 2A. Here they will insert an IV, draw labs, and obtain a short medical history. Please bring an updated list of your current medications.    A provider will come and talk with you about the procedure and obtain consent.    A nurse from the Cardiac Catheterization Lab will then escort you to the procedure area. You will be receiving sedation during the procedure so you will need someone to drive you to and from the hospital.    After the procedure you will recover for a short period (2 - 6 hours). You will be discharged with instructions for post procedure care. However, depending on what the angiogram shows you may have to have stents placed and this might require an overnight stay. We ask that you bring a small bag of necessities for your comfort if you would need to stay overnight. DO NOT BRING ANY VALUABLES!      Pre procedure instructions:  It is recommended that you undergo a COVID-19 PCR swab test 48-72 hours before procedure. You will receive a phone call with more information.   1. Make arrangements to have a  as you will not be allowed to drive following the procedure. Someone should stay with you the night after your procedure.  2.  Do not eat any solid food or milk products for 8 hours prior to the exam. You may drink clear liquids until 2  hours prior to the exam. Clear liquids include the following: water, Jell-O, clear broth, apple juice or any non-carbonated drink that you can see through (no pop!).   3. Do not drink alcohol or smoke 24 hours prior to test.  4. You may take your other morning medications as prescribed with a sip of water. If you currently take an aspirin, continue taking your same dose. If not, take a 325 mg aspirin the day before and the day of your procedure.         If you have further questions, please utilize [employer] to contact us.   If your question concerns the above instructions, contact:  Nadira Carver RN   Cardiology Nurse Coordinator.  [phone]        Post Procedure Instructions:  For 24 hours:    Have an adult stay with you for 24 hours.    Relax and take it easy.    Drink plenty of fluids.    You may eat your normal diet, unless your doctor tells you otherwise.    Do NOT make any important or legal decisions.    Do NOT drive or operate machines at home or at work.    Do NOT drink alcohol.    Do NOT smoke.     Medicines:    If you have begun Plavix (clopidogrel), Effient (prasugrel), or Brilinta (ticagrelor), do not stop taking it until you talk to your heart doctor (cardiologist).     If you are on metformin (Glucophage), do not restart it until you have blood tests (within 2 to 3 days after discharge). When your doctor tells you it is safe, you may restart the metformin.    If you have stopped any other medicines, check with your nurse or provider about when to restart them.     Care of wrist or arm site:    It is normal to have soreness at the puncture site and mild tingling in your hand for up to 3 days.    Remove the Band-Aid after 24 hours. If there is minor oozing, apply another Band-aid and remove it after 12 hours.    Do NOT take a bath, or use a hot tub or pool for the next 48 hours. You may shower.    It is normal to have a small bruise. There should not be a lump at the site.    Do not scrub the  site.    Do not use lotion or powder near the puncture site for 3 days.       Activity Restrictions    For 2 days, do not use your hand or arm to support your weight (such as rising from a chair) or bend your wrist  (such as lifting a garage door).    For 2 days, do not lift more than 5 pounds or exercise your arm (tennis, golf or bowling).       If you start bleeding from the site in your arm: Sit down and press firmly on the site with your fingers for 10 minutes.  Call your doctor as soon as you can.  Call 911 right away if you have bleeding that is heavy or does not stop.  Call your doctor if:    You have a large or growing hard lump around the site.    The site is red, swollen, hot or tender.

## 2021-05-12 NOTE — PROGRESS NOTES
Pt arrived for Coronary CT angiogram. Test, meds and side effects reviewed with pt.  Resting HR 73 bpm. Given 50 mg PO Metoprolol + 10 mg PO Ivabradine per verbal order. Administered 0.8 mg SL nitro on CTA table per order. CTA completed.  Patient tolerated procedure well and denies symptoms of allergic reaction.  Post monitoring completed and VSS.  D/C instructions reviewed with pt whom verbalized understanding of need to increase PO fluids today. D/C to gold waiting room accompanied by staff.

## 2021-05-12 NOTE — TELEPHONE ENCOUNTER
I called and talked to the patient.  Recommend to start aspirin based on CT angiogram showing severe RCA stenosis.  I will also start her on Crestor 10 mg.  In the past she did not feel well with atorvastatin.  She is willing to try a different statin.  Risks of coronary angiogram, including but not limited to, death, MI, stroke, emergent bypass, bleeding and contrast related kidney injury were discussed and patient is agreeable to proceed.  Patient has a scheduled upper endoscopy on 5/18.  Recommended her to reschedule this test after coronary angiogram.    DR.CAN

## 2021-05-13 ENCOUNTER — TELEPHONE (OUTPATIENT)
Dept: CARDIOLOGY | Facility: CLINIC | Age: 81
End: 2021-05-13

## 2021-05-13 ENCOUNTER — MYC MEDICAL ADVICE (OUTPATIENT)
Dept: CARDIOLOGY | Facility: CLINIC | Age: 81
End: 2021-05-13

## 2021-05-13 DIAGNOSIS — R07.89 CHEST DISCOMFORT: ICD-10-CM

## 2021-05-13 DIAGNOSIS — Z11.59 ENCOUNTER FOR SCREENING FOR OTHER VIRAL DISEASES: ICD-10-CM

## 2021-05-13 DIAGNOSIS — I25.10 CORONARY ARTERY CALCIFICATION SEEN ON CT SCAN: Primary | ICD-10-CM

## 2021-05-13 RX ORDER — NITROGLYCERIN 0.4 MG/1
TABLET SUBLINGUAL
Qty: 25 TABLET | Refills: 0 | Status: SHIPPED | OUTPATIENT
Start: 2021-05-13 | End: 2022-11-21

## 2021-05-13 RX ORDER — METOPROLOL SUCCINATE 25 MG/1
25 TABLET, EXTENDED RELEASE ORAL DAILY
Qty: 30 TABLET | Refills: 11 | Status: SHIPPED | OUTPATIENT
Start: 2021-05-13 | End: 2021-07-08

## 2021-05-13 RX ORDER — ASPIRIN 81 MG/1
81 TABLET ORAL DAILY
Status: CANCELLED | OUTPATIENT
Start: 2021-05-13 | End: 2021-05-15

## 2021-05-13 RX ORDER — LIDOCAINE 40 MG/G
CREAM TOPICAL
Status: CANCELLED | OUTPATIENT
Start: 2021-05-13

## 2021-05-13 RX ORDER — SODIUM CHLORIDE 9 MG/ML
INJECTION, SOLUTION INTRAVENOUS CONTINUOUS
Status: CANCELLED | OUTPATIENT
Start: 2021-05-13

## 2021-05-13 NOTE — CONFIDENTIAL NOTE
Orders placed.  Will contact patient today to schedule.    Please also review radiology report:    Rachana Mansfield MD  [facility]             Mei,     Radiologist reported emphysema in your lungs.  They also reported small hiatal hernia.   DR.CAN Nadira Carver, RN  Cardiology Care Coordinator  [facility]  [phone] option 1

## 2021-05-13 NOTE — CONFIDENTIAL NOTE
Date: 5/13/2021    Time of Call: 3:04 PM     Diagnosis:  Abnormal CTA coronary artery     [ VORB ] Ordering provider: Dr. Mansfield  Order: nitroglycerin as needed for chest pain  Metoprolol 25 mg daily       Order received by: Nadira Carver RN       Follow-up/additional notes: both rx sent to preferred pharmacy.  Patient informed.    New Medication: Patient was educated regarding newly prescribed medication, including discussion of  the indication, administration, side effects, and when to report to MD or RN. Patient demonstrated understanding of this information and agreed to call with further questions or concerns.

## 2021-05-13 NOTE — TELEPHONE ENCOUNTER
[facility] Call Center    Phone Message    May a detailed message be left on voicemail: no     Reason for Call: Other: Please reach out to Mei as soon as you are able, she can be reached at [phone]. She would like something for discomfort as the Tramadol is not working.     Action Taken: Message routed to:  Other: [facility]    Travel Screening: Not Applicable

## 2021-05-25 DIAGNOSIS — Z11.59 ENCOUNTER FOR SCREENING FOR OTHER VIRAL DISEASES: ICD-10-CM

## 2021-05-25 LAB
SARS-COV-2 RNA RESP QL NAA+PROBE: NORMAL
SPECIMEN SOURCE: NORMAL

## 2021-05-25 PROCEDURE — U0003 INFECTIOUS AGENT DETECTION BY NUCLEIC ACID (DNA OR RNA); SEVERE ACUTE RESPIRATORY SYNDROME CORONAVIRUS 2 (SARS-COV-2) (CORONAVIRUS DISEASE [COVID-19]), AMPLIFIED PROBE TECHNIQUE, MAKING USE OF HIGH THROUGHPUT TECHNOLOGIES AS DESCRIBED BY CMS-2020-01-R: HCPCS | Performed by: INTERNAL MEDICINE

## 2021-05-25 PROCEDURE — U0005 INFEC AGEN DETEC AMPLI PROBE: HCPCS | Performed by: INTERNAL MEDICINE

## 2021-05-26 LAB
LABORATORY COMMENT REPORT: NORMAL
SARS-COV-2 RNA RESP QL NAA+PROBE: NEGATIVE
SPECIMEN SOURCE: NORMAL

## 2021-05-27 ENCOUNTER — TELEPHONE (OUTPATIENT)
Dept: CARDIOLOGY | Facility: CLINIC | Age: 81
End: 2021-05-27

## 2021-05-27 NOTE — TELEPHONE ENCOUNTER
Left voicemail for patient to complete Travel Screen for Cardiac Cath Lab appointment on 5/28/2021 and inform patient of updated Visitor Policy.

## 2021-05-28 ENCOUNTER — HOSPITAL ENCOUNTER (OUTPATIENT)
Facility: CLINIC | Age: 81
Discharge: HOME | End: 2021-05-28
Attending: INTERNAL MEDICINE | Admitting: INTERNAL MEDICINE
Payer: MEDICARE

## 2021-05-28 ENCOUNTER — APPOINTMENT (OUTPATIENT)
Dept: LAB | Facility: CLINIC | Age: 81
End: 2021-05-28
Attending: INTERNAL MEDICINE
Payer: MEDICARE

## 2021-05-28 ENCOUNTER — APPOINTMENT (OUTPATIENT)
Dept: MEDSURG UNIT | Facility: CLINIC | Age: 81
End: 2021-05-28
Attending: INTERNAL MEDICINE
Payer: MEDICARE

## 2021-05-28 VITALS
OXYGEN SATURATION: 96 % | DIASTOLIC BLOOD PRESSURE: 69 MMHG | BODY MASS INDEX: 28.32 KG/M2 | WEIGHT: 150 LBS | HEIGHT: 61 IN | HEART RATE: 73 BPM | TEMPERATURE: 97.8 F | RESPIRATION RATE: 16 BRPM | SYSTOLIC BLOOD PRESSURE: 139 MMHG

## 2021-05-28 DIAGNOSIS — I25.10 CORONARY ARTERY CALCIFICATION SEEN ON CT SCAN: ICD-10-CM

## 2021-05-28 DIAGNOSIS — R07.89 CHEST DISCOMFORT: ICD-10-CM

## 2021-05-28 DIAGNOSIS — R07.89 CHEST DISCOMFORT: Primary | ICD-10-CM

## 2021-05-28 PROBLEM — Z98.890 STATUS POST CORONARY ANGIOGRAM: Status: ACTIVE | Noted: 2021-05-28

## 2021-05-28 LAB
ANION GAP SERPL CALCULATED.3IONS-SCNC: 4 MMOL/L (ref 3–14)
BUN SERPL-MCNC: 19 MG/DL (ref 7–30)
CALCIUM SERPL-MCNC: 9.9 MG/DL (ref 8.5–10.1)
CHLORIDE SERPL-SCNC: 103 MMOL/L (ref 94–109)
CO2 SERPL-SCNC: 30 MMOL/L (ref 20–32)
CREAT SERPL-MCNC: 1.14 MG/DL (ref 0.52–1.04)
ERYTHROCYTE [DISTWIDTH] IN BLOOD BY AUTOMATED COUNT: 13.8 % (ref 10–15)
GFR SERPL CREATININE-BSD FRML MDRD: 45 ML/MIN/{1.73_M2}
GLUCOSE SERPL-MCNC: 113 MG/DL (ref 70–99)
HCT VFR BLD AUTO: 42.6 % (ref 35–47)
HGB BLD-MCNC: 14.1 G/DL (ref 11.7–15.7)
INR PPP: 0.92 (ref 0.86–1.14)
MCH RBC QN AUTO: 28.8 PG (ref 26.5–33)
MCHC RBC AUTO-ENTMCNC: 33.1 G/DL (ref 31.5–36.5)
MCV RBC AUTO: 87 FL (ref 78–100)
PLATELET # BLD AUTO: 342 10E9/L (ref 150–450)
POTASSIUM SERPL-SCNC: 4.4 MMOL/L (ref 3.4–5.3)
RBC # BLD AUTO: 4.89 10E12/L (ref 3.8–5.2)
SODIUM SERPL-SCNC: 138 MMOL/L (ref 133–144)
WBC # BLD AUTO: 8.7 10E9/L (ref 4–11)

## 2021-05-28 PROCEDURE — 99214 OFFICE O/P EST MOD 30 MIN: CPT | Mod: 24 | Performed by: NURSE PRACTITIONER

## 2021-05-28 PROCEDURE — 999N000054 HC STATISTIC EKG NON-CHARGEABLE

## 2021-05-28 PROCEDURE — 99152 MOD SED SAME PHYS/QHP 5/>YRS: CPT | Performed by: INTERNAL MEDICINE

## 2021-05-28 PROCEDURE — 85610 PROTHROMBIN TIME: CPT | Performed by: INTERNAL MEDICINE

## 2021-05-28 PROCEDURE — 80048 BASIC METABOLIC PNL TOTAL CA: CPT | Performed by: INTERNAL MEDICINE

## 2021-05-28 PROCEDURE — 999N000128 HC STATISTIC PERIPHERAL IV START W/O US GUIDANCE

## 2021-05-28 PROCEDURE — 250N000011 HC RX IP 250 OP 636: Performed by: INTERNAL MEDICINE

## 2021-05-28 PROCEDURE — 93005 ELECTROCARDIOGRAM TRACING: CPT

## 2021-05-28 PROCEDURE — 250N000009 HC RX 250: Performed by: INTERNAL MEDICINE

## 2021-05-28 PROCEDURE — 85027 COMPLETE CBC AUTOMATED: CPT | Performed by: INTERNAL MEDICINE

## 2021-05-28 PROCEDURE — 999N000142 HC STATISTIC PROCEDURE PREP ONLY

## 2021-05-28 PROCEDURE — C1894 INTRO/SHEATH, NON-LASER: HCPCS | Performed by: INTERNAL MEDICINE

## 2021-05-28 PROCEDURE — 36415 COLL VENOUS BLD VENIPUNCTURE: CPT | Performed by: INTERNAL MEDICINE

## 2021-05-28 PROCEDURE — 250N000013 HC RX MED GY IP 250 OP 250 PS 637: Performed by: INTERNAL MEDICINE

## 2021-05-28 PROCEDURE — 93454 CORONARY ARTERY ANGIO S&I: CPT | Performed by: INTERNAL MEDICINE

## 2021-05-28 PROCEDURE — 93010 ELECTROCARDIOGRAM REPORT: CPT | Performed by: INTERNAL MEDICINE

## 2021-05-28 PROCEDURE — 272N000001 HC OR GENERAL SUPPLY STERILE: Performed by: INTERNAL MEDICINE

## 2021-05-28 RX ORDER — ROSUVASTATIN CALCIUM 40 MG/1
40 TABLET, COATED ORAL DAILY
Qty: 90 TABLET | Refills: 3 | Status: SHIPPED | OUTPATIENT
Start: 2021-05-28 | End: 2021-06-02

## 2021-05-28 RX ORDER — IOPAMIDOL 755 MG/ML
INJECTION, SOLUTION INTRAVASCULAR
Status: DISCONTINUED | OUTPATIENT
Start: 2021-05-28 | End: 2021-05-28 | Stop reason: HOSPADM

## 2021-05-28 RX ORDER — LIDOCAINE 40 MG/G
CREAM TOPICAL
Status: DISCONTINUED | OUTPATIENT
Start: 2021-05-28 | End: 2021-05-28 | Stop reason: HOSPADM

## 2021-05-28 RX ORDER — NALOXONE HYDROCHLORIDE 0.4 MG/ML
0.2 INJECTION, SOLUTION INTRAMUSCULAR; INTRAVENOUS; SUBCUTANEOUS
Status: DISCONTINUED | OUTPATIENT
Start: 2021-05-28 | End: 2021-05-28

## 2021-05-28 RX ORDER — NITROGLYCERIN 20 MG/100ML
10-200 INJECTION INTRAVENOUS CONTINUOUS PRN
Status: DISCONTINUED | OUTPATIENT
Start: 2021-05-28 | End: 2021-05-28 | Stop reason: HOSPADM

## 2021-05-28 RX ORDER — ARGATROBAN 1 MG/ML
150 INJECTION, SOLUTION INTRAVENOUS
Status: DISCONTINUED | OUTPATIENT
Start: 2021-05-28 | End: 2021-05-28 | Stop reason: HOSPADM

## 2021-05-28 RX ORDER — ACETAMINOPHEN 325 MG/1
650 TABLET ORAL EVERY 4 HOURS PRN
Status: DISCONTINUED | OUTPATIENT
Start: 2021-05-28 | End: 2021-05-28 | Stop reason: HOSPADM

## 2021-05-28 RX ORDER — EPTIFIBATIDE 2 MG/ML
1 INJECTION, SOLUTION INTRAVENOUS CONTINUOUS PRN
Status: DISCONTINUED | OUTPATIENT
Start: 2021-05-28 | End: 2021-05-28 | Stop reason: HOSPADM

## 2021-05-28 RX ORDER — FLUMAZENIL 0.1 MG/ML
0.2 INJECTION, SOLUTION INTRAVENOUS
Status: DISCONTINUED | OUTPATIENT
Start: 2021-05-28 | End: 2021-05-28 | Stop reason: HOSPADM

## 2021-05-28 RX ORDER — DOPAMINE HYDROCHLORIDE 160 MG/100ML
2-20 INJECTION, SOLUTION INTRAVENOUS CONTINUOUS PRN
Status: DISCONTINUED | OUTPATIENT
Start: 2021-05-28 | End: 2021-05-28 | Stop reason: HOSPADM

## 2021-05-28 RX ORDER — NALOXONE HYDROCHLORIDE 0.4 MG/ML
0.4 INJECTION, SOLUTION INTRAMUSCULAR; INTRAVENOUS; SUBCUTANEOUS
Status: DISCONTINUED | OUTPATIENT
Start: 2021-05-28 | End: 2021-05-28 | Stop reason: HOSPADM

## 2021-05-28 RX ORDER — FENTANYL CITRATE 50 UG/ML
INJECTION, SOLUTION INTRAMUSCULAR; INTRAVENOUS
Status: DISCONTINUED | OUTPATIENT
Start: 2021-05-28 | End: 2021-05-28 | Stop reason: HOSPADM

## 2021-05-28 RX ORDER — FENTANYL CITRATE 50 UG/ML
25-50 INJECTION, SOLUTION INTRAMUSCULAR; INTRAVENOUS
Status: ACTIVE | OUTPATIENT
Start: 2021-05-28 | End: 2021-05-28

## 2021-05-28 RX ORDER — ATROPINE SULFATE 0.1 MG/ML
0.5 INJECTION INTRAVENOUS
Status: DISCONTINUED | OUTPATIENT
Start: 2021-05-28 | End: 2021-05-28 | Stop reason: HOSPADM

## 2021-05-28 RX ORDER — ASPIRIN 81 MG/1
81 TABLET ORAL DAILY
Status: DISCONTINUED | OUTPATIENT
Start: 2021-05-28 | End: 2021-05-28 | Stop reason: HOSPADM

## 2021-05-28 RX ORDER — SODIUM CHLORIDE 9 MG/ML
INJECTION, SOLUTION INTRAVENOUS CONTINUOUS
Status: DISCONTINUED | OUTPATIENT
Start: 2021-05-28 | End: 2021-05-28 | Stop reason: HOSPADM

## 2021-05-28 RX ORDER — NALOXONE HYDROCHLORIDE 0.4 MG/ML
0.2 INJECTION, SOLUTION INTRAMUSCULAR; INTRAVENOUS; SUBCUTANEOUS
Status: DISCONTINUED | OUTPATIENT
Start: 2021-05-28 | End: 2021-05-28 | Stop reason: HOSPADM

## 2021-05-28 RX ORDER — ARGATROBAN 1 MG/ML
350 INJECTION, SOLUTION INTRAVENOUS
Status: DISCONTINUED | OUTPATIENT
Start: 2021-05-28 | End: 2021-05-28 | Stop reason: HOSPADM

## 2021-05-28 RX ORDER — NALOXONE HYDROCHLORIDE 0.4 MG/ML
0.4 INJECTION, SOLUTION INTRAMUSCULAR; INTRAVENOUS; SUBCUTANEOUS
Status: DISCONTINUED | OUTPATIENT
Start: 2021-05-28 | End: 2021-05-28

## 2021-05-28 RX ORDER — EPTIFIBATIDE 2 MG/ML
180 INJECTION, SOLUTION INTRAVENOUS EVERY 10 MIN PRN
Status: DISCONTINUED | OUTPATIENT
Start: 2021-05-28 | End: 2021-05-28 | Stop reason: HOSPADM

## 2021-05-28 RX ORDER — EPTIFIBATIDE 2 MG/ML
2 INJECTION, SOLUTION INTRAVENOUS CONTINUOUS PRN
Status: DISCONTINUED | OUTPATIENT
Start: 2021-05-28 | End: 2021-05-28 | Stop reason: HOSPADM

## 2021-05-28 RX ORDER — DOBUTAMINE HYDROCHLORIDE 200 MG/100ML
2-20 INJECTION INTRAVENOUS CONTINUOUS PRN
Status: DISCONTINUED | OUTPATIENT
Start: 2021-05-28 | End: 2021-05-28 | Stop reason: HOSPADM

## 2021-05-28 RX ORDER — HEPARIN SODIUM 10000 [USP'U]/100ML
100-1000 INJECTION, SOLUTION INTRAVENOUS CONTINUOUS PRN
Status: DISCONTINUED | OUTPATIENT
Start: 2021-05-28 | End: 2021-05-28 | Stop reason: HOSPADM

## 2021-05-28 RX ORDER — ISOSORBIDE MONONITRATE 30 MG/1
30 TABLET, EXTENDED RELEASE ORAL DAILY
Qty: 90 TABLET | Refills: 1 | Status: SHIPPED | OUTPATIENT
Start: 2021-05-28 | End: 2021-06-02

## 2021-05-28 RX ADMIN — ASPIRIN 81 MG: 81 TABLET, COATED ORAL at 09:00

## 2021-05-28 ASSESSMENT — MIFFLIN-ST. JEOR: SCORE: 1092.78

## 2021-05-28 NOTE — DISCHARGE INSTRUCTIONS
Going Home after an Angiogram or Stent Placement (Cardiac)  ______________________________________________      After you go home:    Have an adult stay with you for 24 hours.    Drink plenty of fluids.    You may eat your normal diet, unless your doctor tells you otherwise.    For 24 hours:    Relax and take it easy.    Do NOT smoke.    Do NOT make any important or legal decisions.    Do NOT drive or operate machines at home or at work.    Do NOT drink alcohol.    Remove the Band-Aid after 24 hours. If there is minor oozing, apply another Band-aid and remove it after 12 hours.    For 2 days, do NOT have sex or do any heavy exercise.    Do NOT take a bath, or use a hot tub or pool for at least 3 days. You may shower.    Care of groin site  It is normal to have a small bruise or lump at the site.    Do not scrub the site.    For the first 2 days: Do not stoop or squat. When you cough, sneeze or move your bowels, hold your hand over the puncture site and press gently.    Do not lift more than 10 pounds for at least 3 to 5 days.    Do not use lotion or powder near the puncture site for 3 days.    If you start bleeding from the site in your groin, lie down flat and press firmly  on the site. Call your doctor as soon as you can.      Medicines    If you have started taking Plavix or Effient, do not stop taking it until you talk to your heart doctor (cardiologist).    If you are on metformin (Glucophage), do not restart it until you have blood tests (within 2 to 3 days after discharge). When your doctor tells you it is safe, you may restart the metformin.    If you have stopped any other medicines, check with your nurse or provider about when to restart them.    Call 911 right away if you have bleeding that is heavy or does not stop.    Call your doctor if:    You have a large or growing hard lump around the site.    The site is red, swollen, hot or tender.    Blood or fluid is draining from the site.    You have chills  or a fever greater than 101 F (38 C).    Your leg or arm feels numb or cool.    You have hives, a rash or unusual itching.      [facility] [place]:  [phone] (7 days a week)

## 2021-05-28 NOTE — PLAN OF CARE
D/I/A: Pt roomed on 3C in bay 32.  Arrived via litter and accompanied by Cath lab RN On/Off: Off monitor.  VSS.  Rhythm upon arrival SR on monitor.  Denies pain or sob.  Reviewed activity restrictions and when to notify RN, ie-changes to breathing or increased chest pressure or chest pain.  CCL access:  Right groin CDI.  P: Continue to monitor status.  Discharge to home once meeting criteria.

## 2021-05-28 NOTE — PLAN OF CARE
D/I/A:  Patient is tolerating liquids and foods, ambulating, urinating, right groin puncture site is stable (no bleeding and no hematoma) and patient has a  ().  A+O x4 and making needs known.  Right groin CCL access site C/D/I; no bleeding or hematoma; CMS intact.  VSS.  SR on monitor.  IV access removed.  Education completed and outlined in AVS or handout: medications reviewed with patient.  Questions answered prior to discharge.  Belongings returned to patient at discharge.  Patient reports that metoprolol was on her PTA med list, but she had never actually taken it. Reviewed this with cardiology fellow who wants her to start the IMDUR now and discuss adding the METOPROLOL at her 2 week follow-up appt. No new prescription for metoprolol given today. Reviewed this with patient who verbalized understanding of plan of care.   P: Discharged to self care.  Patient to follow up with appts as per discharge instruction.

## 2021-05-28 NOTE — PROGRESS NOTES
Prepped for cors procedure.  Denies pain.   off site, available for post-procedure transport.  H & P current.  Labs complete.  Consent signed.

## 2021-05-28 NOTE — H&P
History and Physical: Cardiology Cath Lab Service    Yvonne Maria MRN# [id number]   YOB: 1940 Age: 80 year old         Assessment and plan:   80 y.o with PMHx of COPD, HTN, HLD, and tobacco use who is here today for coronary angiogram following abnormal CTA that showed severe mRCA disease as well as moderate prox-mid LAD disease. .     # HTN  # HLD  # Fomer smoker   # Elevated CAC with moderate to severe CAD  Patient has been having chest discomfort for the last 6-8 months. CT angiogram showed severe mid RCA stenosis and moderate stenosis in the proximal to mid LAD. . LVEF 60-65%.     - No contraindications noted, will move forward with coronary angiogram.   - Patient will be admitted as OP to Obs unit post procedure, with plans to discharge home after post procedure orders have been met    Patient seen and discussed with Dr. Styles.     Daniela Bello DNP, APRN, CNP  [facility]  [phone]        HPI:  Yvonne Maria is a pleasant 80 y.o with PMHx of COPD, HTN, HLD, and tobacco use who is here today for coronary angiogram following abnormal CTA that showed severe mRCA disease as well as moderate prox-mid LAD disease. .     She recently saw Dr. Mansfield in clinic on 5/5/21 where she reported ongoing chest discomfort for 6-8 months. She had CT coronary angiogram in 2017 which showed elevated coronary artery calcium score at 333 with no obstructive CAD.  She was started on statin (she recalls the dose was 40 mg but cannot recall the name) which she could not tolerate and stopped. At that visit, Dr. Mansfield discussed stress test vs repeating CT coronary angiogram. Patient preferred to repeat CT for further evaluation. Echo in April of this year shows normal LV function with EF 60-65%.     Patient reports feeling well today, denies recent illness, chest pain, shortness of breath, abdominal pain, headache, vision change, or weakness. No major changes in health  history since previous visit.     No past medical history on file.    No past surgical history on file.    No current facility-administered medications on file prior to encounter.   albuterol (PROAIR HFA, PROVENTIL HFA, VENTOLIN HFA) 108 (90 BASE) MCG/ACT inhaler, Inhale 2 puffs into the lungs every 6 hours as needed for shortness of breath / dyspnea or wheezing (Patient not taking: Reported on 5/5/2021)  Albuterol Sulfate 108 (90 BASE) MCG/ACT AEPB, Inhale 1-2 puffs into the lungs every 4 hours as needed  aspirin 81 MG tablet, Take 81 mg by mouth daily  budesonide (PULMICORT) 0.5 MG/2ML neb solution, Inhale 0.5 mg into the lungs  celecoxib (CELEBREX) 200 MG capsule, Take 200 mg by mouth  cyanocobalamin (VITAMIN B12) 1000 MCG/ML injection, Inject 1 mL into the muscle every 30 days  DULoxetine (CYMBALTA) 30 MG capsule, Take 1 capsule (30 mg) by mouth 2 times daily (Patient not taking: Reported on 5/5/2021)  esomeprazole (NEXIUM) 20 MG DR capsule, Take 20 mg by mouth every morning (before breakfast) Take 30-60 minutes before eating.  fluticasone - vilanterol (BREO ELLIPTA) 100-25 MCG/INH oral inhaler, Inhale 1 puff into the lungs daily  guaiFENesin-codeine (ROBITUSSIN AC) 100-10 MG/5ML SOLN, Take 5 mLs by mouth every 6 hours as needed for cough (Patient not taking: Reported on 5/5/2021)  hydrocortisone valerate (WEST-YVROSE) 0.2 % ointment, Apply topically as needed  levothyroxine (SYNTHROID,LEVOTHROID) 100 MCG tablet, Take 1 tablet (100 mcg) by mouth daily  metoprolol succinate ER (TOPROL-XL) 25 MG 24 hr tablet, Take 1 tablet (25 mg) by mouth daily  nitroGLYcerin (NITROSTAT) 0.4 MG sublingual tablet, For chest pain place 1 tablet under the tongue every 5 minutes for 3 doses. If symptoms persist 5 minutes after 1st dose call 911.  potassium chloride SA (K-DUR,KLOR-CON M) 20 MEQ tablet,   predniSONE (DELTASONE) 5 MG tablet, TAKE 1 TABLET BY MOUTH TWICE DAILY  rosuvastatin (CRESTOR) 10 MG tablet, Take 1 tablet (10 mg) by  mouth daily  traMADol (ULTRAM) 50 MG tablet, TAKE 2 TABLETS BY MOUTH EVERY 8 TO 12 HOURS AS NEEDED FOR PAIN  triamterene-hydrochlorothiazide (MAXZIDE-25) 37.5-25 MG per tablet, Take 1 tablet by mouth daily        Family History   Problem Relation Age of Onset     Myocardial Infarction Father      Heart Disease Maternal Grandmother      Lymphoma Maternal Grandfather      Heart Disease Paternal Grandmother      Myocardial Infarction Son        Social History     Tobacco Use     Smoking status: Former Smoker     Packs/day: 1.00     Years: 27.00     Pack years: 27.00     Types: Cigarettes     Quit date: 3/28/1986     Years since quittin.1     Smokeless tobacco: Never Used   Substance Use Topics     Alcohol use: Yes     Alcohol/week: 0.0 standard drinks     Comment: Wine 5-7 glasses per week       Allergies   Allergen Reactions     Lyrica [Pregabalin] Swelling         ROS:   Skin: negative  Respiratory: No shortness of breath, dyspnea on exertion, cough, or hemoptysis  Cardiovascular: negative  Gastrointestinal: negative  Genitourinary: negative  Musculoskeletal: negative  Neurologic: negative  Hematologic/Lymphatic/Immunologic: negative  Endocrine: negative    Physical Examination:  Vitals: There were no vitals taken for this visit.  BMI= There is no height or weight on file to calculate BMI.    GENERAL APPEARANCE: healthy, alert and no distress  HEENT: no icterus, no xanthelasmas, normal pupil size and reaction, normal palate, mucosa moist  NECK: JVP 0 cm, brisk carotid upstroke bilaterally  CHEST: lungs clear to auscultation without rales, rhonchi or wheezes, no use of accessory muscles, no retractions  CARDIOVASCULAR: regular rhythm, normal S1 and S2, no S3 or S4 and no murmur, click or rub.  ABDOMEN: soft, non tender, without hepatosplenomegaly, no masses palpable, bowel sounds normal  EXTREMITIES: warm, no edema, DP/PT pulses 2+ bilaterally, no clubbing or cyanosis   NEURO: alert and oriented to  person/place/time, normal speech, gait and affect  SKIN: no ecchymoses, no rashes      Laboratory:  CMP  Recent Labs   Lab 05/28/21  1116      POTASSIUM 4.4   CHLORIDE 103   CO2 30   ANIONGAP 4   *   BUN 19   CR 1.14*   GFRESTIMATED 45*   GFRESTBLACK 53*   ARTURO 9.9     CBC  Recent Labs   Lab 05/28/21  1116   WBC 8.7   RBC 4.89   HGB 14.1   HCT 42.6   MCV 87   MCH 28.8   MCHC 33.1   RDW 13.8          No results found for: TROPI, TROPONIN, TROPR, TROPN      EKG : 5/28/21      TTE 4/30/21:  Interpretation Summary     Global and regional left ventricular function is normal with an EF of 60-65%.  Global right ventricular function is normal.  No significant valvular abnormalities were noted.  The inferior vena cava was normal in size with preserved respiratory  variability.    CT Coronary angiogram: 5/12/21  IMPRESSION:  1.  Severe mid RCA stenosis, and moderate stenosis in the proximal to  mid LAD. Results conveyed to referring provider.  2.  Total Agatston score 491 placing the patient in the 81 percentile  when compared to age and gender matched control group.  3.  Please review Radiology report for incidental noncardiac findings  that will follow separately.

## 2021-05-29 LAB — INTERPRETATION ECG - MUSE: NORMAL

## 2021-06-01 NOTE — PROGRESS NOTES
Referring provider: Self-referred    Chief complaint: Chest discomfort    HPI: Ms. Yvonne Maria is a 80 year old  female with PMH significant for    -COPD (panlobular emphysema) on oxygen at night and inhalers  -Pulmonary nodules  -Hypertension, well controlled  -Probable polymyalgia rheumatica  -Ménière's disease  -Celiac disease  -Former smoker    Patient is being seen today for chest discomfort over the last 6 to 8 months.  Chest discomfort is around the epigastric and lower midsternal chest.  The discomfort radiates to her back.  Symptom occurs both at rest and with exertion mainly with stairs.  She tells me that she feels the discomfort while sitting in clinic right now.  It is mild.  Does not happen every day.  Sometimes associated with sweating but denies nausea.  Last for 20 to 30 minutes.  She tells me that she herself has decided to see cardiology, GI and pulmonology for this particular symptom.  She is scheduled for upper endoscopy on 5/18.    Patient was evaluated in cardiology at [facility] in 2017 for similar symptoms.  She had CT coronary angiogram in 2017 which showed elevated coronary artery calcium score at 333 with no obstructive CAD.  She was started on statin (she recalls the dose was 40 mg but cannot recall the name) which she could not tolerate and stopped.  Patient expressed her unpleasant memory about this because she tells me that the statin was never discussed with her before she was started on this treatment.    Patient has history of Ménière's and she tells me that she was started on triamterene hydrochlorothiazide initially 2 tablets a day.  She has made significant lifestyle changes.  She decreased salt intake and she was able to lower the dose of the medication to once daily now.    She has history of hiatal hernia surgery in 2019. She has COPD currently on inhalers and oxygen at night. Patient has seen rheumatology in the past for suspicious temporal arteritis.  Temporal  artery biopsy was unremarkable.  She was on steroids for polymyalgia rheumatica. She tells me that she has celiac disease currently well controlled with diet.    No history of diabetes.  She has hyperlipidemia currently not on treatment.      Patient has 27-pack-year history of smoking.  Quit date 1986.    Current medications are triamterene hydrochlorothiazide 1 tablet a day, aspirin 81 mg, potassium supplement, prednisone 5 mg once daily.    Medications, personal, family, and social history reviewed with patient and revised.    Interval history (6/2/2021):  Patient had coronary angiogram on 5/28/2021 which showed non-obstructive coronary artery disease (pLAD: 50%; mRCA: 60%). Patient was started on Imdur 30 mg daily. Rosuvastatin was increased to 40 mg daily. Patient states after  taking  Rosuvastatin and imdur, she could not get out of bed the next day. She had achiness all over her body. She felt week and fatigued. Patient stopped taking 40 mg of Rosuvastatin and started taking the 10 mg daily, starting today. She denies chest pain. SOB with exertion is stable, and she attributes this to her emphysema. No lightheadedness or dizziness. No syncope or presyncope.      PAST MEDICAL HISTORY:  Past Medical History:   Diagnosis Date     Arthritis     osteo     Cancer (H)     skin cancer     COPD (chronic obstructive pulmonary disease) (H)      Heart disease      Hypertension      PONV (postoperative nausea and vomiting)      Thyroid disease        CURRENT MEDICATIONS:  Current Outpatient Medications   Medication Sig Dispense Refill     albuterol (PROAIR HFA, PROVENTIL HFA, VENTOLIN HFA) 108 (90 BASE) MCG/ACT inhaler Inhale 2 puffs into the lungs every 6 hours as needed for shortness of breath / dyspnea or wheezing (Patient not taking: Reported on 5/5/2021) 1 Inhaler 1     Albuterol Sulfate 108 (90 BASE) MCG/ACT AEPB Inhale 1-2 puffs into the lungs every 4 hours as needed       aspirin 81 MG tablet Take 81 mg by mouth  daily       budesonide (PULMICORT) 0.5 MG/2ML neb solution Inhale 0.5 mg into the lungs       celecoxib (CELEBREX) 200 MG capsule Take 200 mg by mouth       cyanocobalamin (VITAMIN B12) 1000 MCG/ML injection Inject 1 mL into the muscle every 30 days       DULoxetine (CYMBALTA) 30 MG capsule Take 1 capsule (30 mg) by mouth 2 times daily (Patient not taking: Reported on 5/5/2021) 180 capsule 1     esomeprazole (NEXIUM) 20 MG DR capsule Take 20 mg by mouth every morning (before breakfast) Take 30-60 minutes before eating.       fluticasone - vilanterol (BREO ELLIPTA) 100-25 MCG/INH oral inhaler Inhale 1 puff into the lungs daily       guaiFENesin-codeine (ROBITUSSIN AC) 100-10 MG/5ML SOLN Take 5 mLs by mouth every 6 hours as needed for cough (Patient not taking: Reported on 5/5/2021) 120 mL 0     hydrocortisone valerate (WEST-YVROSE) 0.2 % ointment Apply topically as needed       isosorbide mononitrate (IMDUR) 30 MG 24 hr tablet Take 1 tablet (30 mg) by mouth daily 90 tablet 1     levothyroxine (SYNTHROID,LEVOTHROID) 100 MCG tablet Take 1 tablet (100 mcg) by mouth daily 90 tablet 3     metoprolol succinate ER (TOPROL-XL) 25 MG 24 hr tablet Take 1 tablet (25 mg) by mouth daily 30 tablet 11     nitroGLYcerin (NITROSTAT) 0.4 MG sublingual tablet For chest pain place 1 tablet under the tongue every 5 minutes for 3 doses. If symptoms persist 5 minutes after 1st dose call 911. 25 tablet 0     potassium chloride SA (K-DUR,KLOR-CON M) 20 MEQ tablet   3     predniSONE (DELTASONE) 5 MG tablet TAKE 1 TABLET BY MOUTH TWICE DAILY       rosuvastatin (CRESTOR) 40 MG tablet Take 1 tablet (40 mg) by mouth daily 90 tablet 3     traMADol (ULTRAM) 50 MG tablet TAKE 2 TABLETS BY MOUTH EVERY 8 TO 12 HOURS AS NEEDED FOR PAIN       triamterene-hydrochlorothiazide (MAXZIDE-25) 37.5-25 MG per tablet Take 1 tablet by mouth daily 90 tablet 3       PAST SURGICAL HISTORY:  Past Surgical History:   Procedure Laterality Date     APPENDECTOMY       BACK  SURGERY       CHOLECYSTECTOMY       ENT SURGERY      tonsils     EYE SURGERY      cataracts     GI SURGERY      hiatel hernia repair       ALLERGIES:     Allergies   Allergen Reactions     Lyrica [Pregabalin] Swelling       FAMILY HISTORY:  Family History   Problem Relation Age of Onset     Myocardial Infarction Father      Heart Disease Maternal Grandmother      Lymphoma Maternal Grandfather      Heart Disease Paternal Grandmother      Myocardial Infarction Son          SOCIAL HISTORY:  Social History     Tobacco Use     Smoking status: Former Smoker     Packs/day: 1.00     Years: 27.00     Pack years: 27.00     Types: Cigarettes     Quit date: 3/28/1986     Years since quittin.2     Smokeless tobacco: Never Used   Substance Use Topics     Alcohol use: Yes     Alcohol/week: 0.0 standard drinks     Comment: Wine 5-7 glasses per week     Drug use: No       ROS:   Constitutional: No fever, chills, or sweats. Weight stable.   Cardiovascular: As per HPI.     Exam:  /60   Pulse 68   Wt 67.6 kg (149 lb)   SpO2 96%   BMI 28.15 kg/m      GENERAL APPEARANCE: alert and no distress  HEENT: no icterus, no central cyanosis  CARDIOVASCULAR: regular rhythm, normal S1, S2, no S3 or S4 and no murmur, click or rub, precordium quiet with normal PMI.  EXTREMITIES: no edema  NEURO: alert, normal speech,and affect  SKIN: no ecchymoses, no rashes     I have reviewed the labs and personally reviewed the imaging below and made my comment in the assessment and plan.    Labs:  CBC RESULTS:   Lab Results   Component Value Date    WBC 8.7 2021    RBC 4.89 2021    HGB 14.1 2021    HCT 42.6 2021    MCV 87 2021    MCH 28.8 2021    MCHC 33.1 2021    RDW 13.8 2021     2021       BMP RESULTS:  Lab Results   Component Value Date     2021    POTASSIUM 4.4 2021    CHLORIDE 103 2021    CO2 30 2021    ANIONGAP 4 2021     (H) 2021     BUN 19 05/28/2021    CR 1.14 (H) 05/28/2021    GFRESTIMATED 45 (L) 05/28/2021    GFRESTBLACK 53 (L) 05/28/2021    ARTURO 9.9 05/28/2021     Recent Labs   Lab Test 05/06/21  0930   CHOL 225*   HDL 77   *   TRIG 134         CT coronary angiogram 8/9/2017 [facility]    PLAQUE TYPE:  Hard: Calcium Score = 333, 79th percentile for matched age   and sex.     SCAN QUALITY: Good.    FINDINGS:    CORONARY ANATOMY:  (Right Dominant)    LEFT MAIN:  There is moderate distal left main calcification.  There is no   stenosis.    LEFT ANTERIOR DESCENDING:  Diffuse calcification with no stenosis.    FIRST DIAGONAL:  No stenoses.     SECOND DIAGONAL:  No stenoses.       CIRCUMFLEX:  Nondominant.  No stenosis.    FIRST OBTUSE MARGINAL:  No stenoses.     SECOND OBTUSE MARGINAL:  No stenoses.       RIGHT CORONARY ARTERY:  Dominant.  Diffuse calcification.  Careful review   of the right coronary artery shows a mild to moderate mid stenosis before   the right ventricular branch.      AORTA:  Proximal ascending aorta, mid-through distal descending thoracic   aorta is normal.    PERICARDIUM:  Normal thickness and without an effusion.    Exercise Stress echocardiogram [facility] 5/16/2013   1. LV function is normal. The visually estimated ejection fraction is 60%.        2. No obvious wall motion abnormalities, however endocardial definition is       limited.                                                                           3. Normal right ventricular size and systolic function.                           4. No hemodynamically significant valvular disease.                               5. No evidence of pulmonary hypertension. The estimated pulmonary artery         systolic pressure is normal at 20.6 mmHg plus right atrial pressure.               6. No pericardial effusion.     EKG personally reviewed in clinic sinus rhythm otherwise normal.    Echocardiogram 4/30/2021:  Global and regional left ventricular function  is normal with an EF of 60-65%.  Global right ventricular function is normal.  No significant valvular abnormalities were noted.  The inferior vena cava was normal in size with preserved respiratory  variability.  Previous study not available for comparison.    CT coronary angiogram 5/12/2021  IMPRESSION:  1.  Severe mid RCA stenosis, and moderate stenosis in the proximal to  mid LAD. Results conveyed to referring provider.  2.  Total Agatston score 491 placing the patient in the 81 percentile  when compared to age and gender matched control group.    Coronary angiogram 5/28/2021 [facility]:  Left Main   The vessel was visualized by selective angiography and is moderate in size. There was mildly calcified vessel disease.   Left Anterior Descending   The vessel is moderate in size.   Prox LAD to Mid LAD lesion is 50% stenosed.   First Diagonal Branch   The vessel is small.   First Septal Branch   The vessel is small and is angiographically normal.   Ramus Intermedius   The vessel is moderate in size.   Lateral Ramus Intermedius   The vessel is small and is angiographically normal.   Left Circumflex   The vessel is moderate in size and is angiographically normal.   First Obtuse Marginal Branch   The vessel is moderate in size and is angiographically normal.   Second Obtuse Marginal Branch   The vessel is moderate in size and is angiographically normal.   Lateral Second Obtuse Marginal Branch   The vessel is small and is angiographically normal.   Third Obtuse Marginal Branch   The vessel is small and is angiographically normal.   Lateral Third Obtuse Marginal Branch   The vessel is small and is angiographically normal.   Right Coronary Artery   The vessel was visualized by selective angiography and is small. There was mildly calcified vessel disease.   Prox RCA lesion is 50% stenosed.   Mid RCA lesion is 60% stenosed.   Acute Marginal Branch   The vessel is small and is angiographically normal.   Right Ventricular Branch    The vessel is small and is angiographically normal.   Right Posterior Descending Artery   The vessel is small and is angiographically normal.     Assessment and Plan:     1. Coronary artery disease  2. Statin intolerance  - Coronary angiogram showed moderate non-obstructive coronary artery disease.   - Continue Aspirin 81 mg daily. Patient did not tolerate Rosuvastatin 40 mg daily. She also did not tolerate Atorvastatin 40 mg in the past.  - Resume rosuvastatin 10 mg every other day  - Stop Imdur 30 mg daily.  - Lipid panel in 2 months.    2. Hypertension  - Continue Toprol XL 25 mg daily and Triamterene-hydrochlorothiazide 37.5-25 mg daily.    Medication changes:  - Stop Imdur.  - Resume Rosuvastatin 10 mg every other day.    RTC as needed.    Patient was seen and discussed with Dr. aMnsfield.    Mikhail Reza MD  Cardiology fellow    Attending note 6/2/2021:     I have personally seen and examined the patient independently.  The history and physical findings are accurate as recorded .  My additional findings, if any, have been incorporated into the body of the note. All labs, imaging studies, ECG data have been reviewed personally. The assessment and plans outlined reflect our joint decision making.     When I saw patient initially on 5/5 her major complaint was dyspnea on exertion.  As you know she has emphysema as well.  CT coronary angiogram suggested severe mid RCA disease.  Subsequently she had coronary angiogram on 5/28 which showed moderate coronary artery disease in the LAD and RCA.  She did not require PCI.    She is being seen today for follow-up.  She tells me that her breathing has been gradually worsening which is likely due to emphysema.  She has scheduled a follow-up with pulmonology in July of this year.  She did not tolerate Imdur.  I do not think she needs Imdur at this time.  I will have her stay on Crestor 10 mg every other day and check her lipid profile in 2 months.  If LDL is  less than 70 I think she can continue with Crestor 10 mg every other day.  Patient sees Dr. Ruba Mccracken (PCP) at [facility].  She is agreeable to follow-up with her PCP at this time.    I will see patient as needed.      Total time spent 40 minutes including precharting, face-to-face clinic visit, review of labs/imaging and medical documentation     Rachana QUINN MD  [facility] Division of Cardiology  Pager [phone]

## 2021-06-02 ENCOUNTER — OFFICE VISIT (OUTPATIENT)
Dept: CARDIOLOGY | Facility: CLINIC | Age: 81
End: 2021-06-02
Payer: MEDICARE

## 2021-06-02 VITALS
OXYGEN SATURATION: 96 % | WEIGHT: 149 LBS | BODY MASS INDEX: 28.15 KG/M2 | SYSTOLIC BLOOD PRESSURE: 134 MMHG | HEART RATE: 68 BPM | DIASTOLIC BLOOD PRESSURE: 60 MMHG

## 2021-06-02 DIAGNOSIS — Z87.891 FORMER SMOKER: ICD-10-CM

## 2021-06-02 DIAGNOSIS — I10 HYPERTENSION, WELL CONTROLLED: ICD-10-CM

## 2021-06-02 DIAGNOSIS — J43.9 PULMONARY EMPHYSEMA, UNSPECIFIED EMPHYSEMA TYPE (H): ICD-10-CM

## 2021-06-02 DIAGNOSIS — I25.10 CORONARY ARTERY DISEASE INVOLVING NATIVE CORONARY ARTERY OF NATIVE HEART WITHOUT ANGINA PECTORIS: Primary | ICD-10-CM

## 2021-06-02 DIAGNOSIS — R91.8 PULMONARY NODULES: ICD-10-CM

## 2021-06-02 PROCEDURE — 99215 OFFICE O/P EST HI 40 MIN: CPT | Mod: GC | Performed by: INTERNAL MEDICINE

## 2021-06-02 RX ORDER — ROSUVASTATIN CALCIUM 10 MG/1
10 TABLET, COATED ORAL DAILY
Refills: 0 | COMMUNITY
End: 2022-05-17

## 2021-06-02 RX ORDER — ROSUVASTATIN CALCIUM 40 MG/1
TABLET, COATED ORAL
Qty: 90 TABLET | Refills: 3 | COMMUNITY
Start: 2021-06-02 | End: 2021-06-02

## 2021-06-02 NOTE — LETTER
6/2/2021      RE: Yvonne Maria  [address] [facility] [address]       Dear Colleague,    Thank you for the opportunity to participate in the care of your patient, Yvonne Maria, at the [facility]. Please see a copy of my visit note below.    Referring provider: Self-referred    Chief complaint: Chest discomfort    HPI: Ms. Yvonne Maria is a 80 year old  female with PMH significant for    -COPD (panlobular emphysema) on oxygen at night and inhalers  -Pulmonary nodules  -Hypertension, well controlled  -Probable polymyalgia rheumatica  -Ménière's disease  -Celiac disease  -Former smoker    Patient is being seen today for chest discomfort over the last 6 to 8 months.  Chest discomfort is around the epigastric and lower midsternal chest.  The discomfort radiates to her back.  Symptom occurs both at rest and with exertion mainly with stairs.  She tells me that she feels the discomfort while sitting in clinic right now.  It is mild.  Does not happen every day.  Sometimes associated with sweating but denies nausea.  Last for 20 to 30 minutes.  She tells me that she herself has decided to see cardiology, GI and pulmonology for this particular symptom.  She is scheduled for upper endoscopy on 5/18.    Patient was evaluated in cardiology at [facility] in 2017 for similar symptoms.  She had CT coronary angiogram in 2017 which showed elevated coronary artery calcium score at 333 with no obstructive CAD.  She was started on statin (she recalls the dose was 40 mg but cannot recall the name) which she could not tolerate and stopped.  Patient expressed her unpleasant memory about this because she tells me that the statin was never discussed with her before she was started on this treatment.    Patient has history of Ménière's and she tells me that she was started on triamterene hydrochlorothiazide initially 2 tablets a day.   She has made significant lifestyle changes.  She decreased salt intake and she was able to lower the dose of the medication to once daily now.    She has history of hiatal hernia surgery in 2019. She has COPD currently on inhalers and oxygen at night. Patient has seen rheumatology in the past for suspicious temporal arteritis.  Temporal artery biopsy was unremarkable.  She was on steroids for polymyalgia rheumatica. She tells me that she has celiac disease currently well controlled with diet.    No history of diabetes.  She has hyperlipidemia currently not on treatment.      Patient has 27-pack-year history of smoking.  Quit date 1986.    Current medications are triamterene hydrochlorothiazide 1 tablet a day, aspirin 81 mg, potassium supplement, prednisone 5 mg once daily.    Medications, personal, family, and social history reviewed with patient and revised.    Interval history (6/2/2021):  Patient had coronary angiogram on 5/28/2021 which showed non-obstructive coronary artery disease (pLAD: 50%; mRCA: 60%). Patient was started on Imdur 30 mg daily. Rosuvastatin was increased to 40 mg daily. Patient states after  taking  Rosuvastatin and imdur, she could not get out of bed the next day. She had achiness all over her body. She felt week and fatigued. Patient stopped taking 40 mg of Rosuvastatin and started taking the 10 mg daily, starting today. She denies chest pain. SOB with exertion is stable, and she attributes this to her emphysema. No lightheadedness or dizziness. No syncope or presyncope.      PAST MEDICAL HISTORY:  Past Medical History:   Diagnosis Date     Arthritis     osteo     Cancer (H)     skin cancer     COPD (chronic obstructive pulmonary disease) (H)      Heart disease      Hypertension      PONV (postoperative nausea and vomiting)      Thyroid disease        CURRENT MEDICATIONS:  Current Outpatient Medications   Medication Sig Dispense Refill     albuterol (PROAIR HFA, PROVENTIL HFA, VENTOLIN HFA)  108 (90 BASE) MCG/ACT inhaler Inhale 2 puffs into the lungs every 6 hours as needed for shortness of breath / dyspnea or wheezing (Patient not taking: Reported on 5/5/2021) 1 Inhaler 1     Albuterol Sulfate 108 (90 BASE) MCG/ACT AEPB Inhale 1-2 puffs into the lungs every 4 hours as needed       aspirin 81 MG tablet Take 81 mg by mouth daily       budesonide (PULMICORT) 0.5 MG/2ML neb solution Inhale 0.5 mg into the lungs       celecoxib (CELEBREX) 200 MG capsule Take 200 mg by mouth       cyanocobalamin (VITAMIN B12) 1000 MCG/ML injection Inject 1 mL into the muscle every 30 days       DULoxetine (CYMBALTA) 30 MG capsule Take 1 capsule (30 mg) by mouth 2 times daily (Patient not taking: Reported on 5/5/2021) 180 capsule 1     esomeprazole (NEXIUM) 20 MG DR capsule Take 20 mg by mouth every morning (before breakfast) Take 30-60 minutes before eating.       fluticasone - vilanterol (BREO ELLIPTA) 100-25 MCG/INH oral inhaler Inhale 1 puff into the lungs daily       guaiFENesin-codeine (ROBITUSSIN AC) 100-10 MG/5ML SOLN Take 5 mLs by mouth every 6 hours as needed for cough (Patient not taking: Reported on 5/5/2021) 120 mL 0     hydrocortisone valerate (WEST-YVROSE) 0.2 % ointment Apply topically as needed       isosorbide mononitrate (IMDUR) 30 MG 24 hr tablet Take 1 tablet (30 mg) by mouth daily 90 tablet 1     levothyroxine (SYNTHROID,LEVOTHROID) 100 MCG tablet Take 1 tablet (100 mcg) by mouth daily 90 tablet 3     metoprolol succinate ER (TOPROL-XL) 25 MG 24 hr tablet Take 1 tablet (25 mg) by mouth daily 30 tablet 11     nitroGLYcerin (NITROSTAT) 0.4 MG sublingual tablet For chest pain place 1 tablet under the tongue every 5 minutes for 3 doses. If symptoms persist 5 minutes after 1st dose call 911. 25 tablet 0     potassium chloride SA (K-DUR,KLOR-CON M) 20 MEQ tablet   3     predniSONE (DELTASONE) 5 MG tablet TAKE 1 TABLET BY MOUTH TWICE DAILY       rosuvastatin (CRESTOR) 40 MG tablet Take 1 tablet (40 mg) by mouth  daily 90 tablet 3     traMADol (ULTRAM) 50 MG tablet TAKE 2 TABLETS BY MOUTH EVERY 8 TO 12 HOURS AS NEEDED FOR PAIN       triamterene-hydrochlorothiazide (MAXZIDE-25) 37.5-25 MG per tablet Take 1 tablet by mouth daily 90 tablet 3       PAST SURGICAL HISTORY:  Past Surgical History:   Procedure Laterality Date     APPENDECTOMY       BACK SURGERY       CHOLECYSTECTOMY       ENT SURGERY      tonsils     EYE SURGERY      cataracts     GI SURGERY      hiatel hernia repair       ALLERGIES:     Allergies   Allergen Reactions     Lyrica [Pregabalin] Swelling       FAMILY HISTORY:  Family History   Problem Relation Age of Onset     Myocardial Infarction Father      Heart Disease Maternal Grandmother      Lymphoma Maternal Grandfather      Heart Disease Paternal Grandmother      Myocardial Infarction Son          SOCIAL HISTORY:  Social History     Tobacco Use     Smoking status: Former Smoker     Packs/day: 1.00     Years: 27.00     Pack years: 27.00     Types: Cigarettes     Quit date: 3/28/1986     Years since quittin.2     Smokeless tobacco: Never Used   Substance Use Topics     Alcohol use: Yes     Alcohol/week: 0.0 standard drinks     Comment: Wine 5-7 glasses per week     Drug use: No       ROS:   Constitutional: No fever, chills, or sweats. Weight stable.   Cardiovascular: As per HPI.     Exam:  /60   Pulse 68   Wt 67.6 kg (149 lb)   SpO2 96%   BMI 28.15 kg/m      GENERAL APPEARANCE: alert and no distress  HEENT: no icterus, no central cyanosis  CARDIOVASCULAR: regular rhythm, normal S1, S2, no S3 or S4 and no murmur, click or rub, precordium quiet with normal PMI.  EXTREMITIES: no edema  NEURO: alert, normal speech,and affect  SKIN: no ecchymoses, no rashes     I have reviewed the labs and personally reviewed the imaging below and made my comment in the assessment and plan.    Labs:  CBC RESULTS:   Lab Results   Component Value Date    WBC 8.7 2021    RBC 4.89 2021    HGB 14.1 2021     HCT 42.6 05/28/2021    MCV 87 05/28/2021    MCH 28.8 05/28/2021    MCHC 33.1 05/28/2021    RDW 13.8 05/28/2021     05/28/2021       BMP RESULTS:  Lab Results   Component Value Date     05/28/2021    POTASSIUM 4.4 05/28/2021    CHLORIDE 103 05/28/2021    CO2 30 05/28/2021    ANIONGAP 4 05/28/2021     (H) 05/28/2021    BUN 19 05/28/2021    CR 1.14 (H) 05/28/2021    GFRESTIMATED 45 (L) 05/28/2021    GFRESTBLACK 53 (L) 05/28/2021    ARTURO 9.9 05/28/2021     Recent Labs   Lab Test 05/06/21  0930   CHOL 225*   HDL 77   *   TRIG 134         CT coronary angiogram 8/9/2017 [facility]    PLAQUE TYPE:  Hard: Calcium Score = 333, 79th percentile for matched age   and sex.     SCAN QUALITY: Good.    FINDINGS:    CORONARY ANATOMY:  (Right Dominant)    LEFT MAIN:  There is moderate distal left main calcification.  There is no   stenosis.    LEFT ANTERIOR DESCENDING:  Diffuse calcification with no stenosis.    FIRST DIAGONAL:  No stenoses.     SECOND DIAGONAL:  No stenoses.       CIRCUMFLEX:  Nondominant.  No stenosis.    FIRST OBTUSE MARGINAL:  No stenoses.     SECOND OBTUSE MARGINAL:  No stenoses.       RIGHT CORONARY ARTERY:  Dominant.  Diffuse calcification.  Careful review   of the right coronary artery shows a mild to moderate mid stenosis before   the right ventricular branch.      AORTA:  Proximal ascending aorta, mid-through distal descending thoracic   aorta is normal.    PERICARDIUM:  Normal thickness and without an effusion.    Exercise Stress echocardiogram [facility] 5/16/2013   1. LV function is normal. The visually estimated ejection fraction is 60%.        2. No obvious wall motion abnormalities, however endocardial definition is       limited.                                                                           3. Normal right ventricular size and systolic function.                           4. No hemodynamically significant valvular disease.                                5. No evidence of pulmonary hypertension. The estimated pulmonary artery         systolic pressure is normal at 20.6 mmHg plus right atrial pressure.               6. No pericardial effusion.     EKG personally reviewed in clinic sinus rhythm otherwise normal.    Echocardiogram 4/30/2021:  Global and regional left ventricular function is normal with an EF of 60-65%.  Global right ventricular function is normal.  No significant valvular abnormalities were noted.  The inferior vena cava was normal in size with preserved respiratory  variability.  Previous study not available for comparison.    CT coronary angiogram 5/12/2021  IMPRESSION:  1.  Severe mid RCA stenosis, and moderate stenosis in the proximal to  mid LAD. Results conveyed to referring provider.  2.  Total Agatston score 491 placing the patient in the 81 percentile  when compared to age and gender matched control group.    Coronary angiogram 5/28/2021 [facility]:  Left Main   The vessel was visualized by selective angiography and is moderate in size. There was mildly calcified vessel disease.   Left Anterior Descending   The vessel is moderate in size.   Prox LAD to Mid LAD lesion is 50% stenosed.   First Diagonal Branch   The vessel is small.   First Septal Branch   The vessel is small and is angiographically normal.   Ramus Intermedius   The vessel is moderate in size.   Lateral Ramus Intermedius   The vessel is small and is angiographically normal.   Left Circumflex   The vessel is moderate in size and is angiographically normal.   First Obtuse Marginal Branch   The vessel is moderate in size and is angiographically normal.   Second Obtuse Marginal Branch   The vessel is moderate in size and is angiographically normal.   Lateral Second Obtuse Marginal Branch   The vessel is small and is angiographically normal.   Third Obtuse Marginal Branch   The vessel is small and is angiographically normal.   Lateral Third Obtuse Marginal Branch   The vessel is small  and is angiographically normal.   Right Coronary Artery   The vessel was visualized by selective angiography and is small. There was mildly calcified vessel disease.   Prox RCA lesion is 50% stenosed.   Mid RCA lesion is 60% stenosed.   Acute Marginal Branch   The vessel is small and is angiographically normal.   Right Ventricular Branch   The vessel is small and is angiographically normal.   Right Posterior Descending Artery   The vessel is small and is angiographically normal.     Assessment and Plan:     1. Coronary artery disease  2. Statin intolerance  - Coronary angiogram showed moderate non-obstructive coronary artery disease.   - Continue Aspirin 81 mg daily. Patient did not tolerate Rosuvastatin 40 mg daily. She also did not tolerate Atorvastatin 40 mg in the past.  - Resume rosuvastatin 10 mg every other day  - Stop Imdur 30 mg daily.  - Lipid panel in 2 months.    2. Hypertension  - Continue Toprol XL 25 mg daily and Triamterene-hydrochlorothiazide 37.5-25 mg daily.    Medication changes:  - Stop Imdur.  - Resume Rosuvastatin 10 mg every other day.    RTC as needed.    Patient was seen and discussed with Dr. Mansfield.    Mikhail Reza MD  Cardiology fellow    Attending note 6/2/2021:     I have personally seen and examined the patient independently.  The history and physical findings are accurate as recorded .  My additional findings, if any, have been incorporated into the body of the note. All labs, imaging studies, ECG data have been reviewed personally. The assessment and plans outlined reflect our joint decision making.     When I saw patient initially on 5/5 her major complaint was dyspnea on exertion.  As you know she has emphysema as well.  CT coronary angiogram suggested severe mid RCA disease.  Subsequently she had coronary angiogram on 5/28 which showed moderate coronary artery disease in the LAD and RCA.  She did not require PCI.    She is being seen today for follow-up.  She  tells me that her breathing has been gradually worsening which is likely due to emphysema.  She has scheduled a follow-up with pulmonology in July of this year.  She did not tolerate Imdur.  I do not think she needs Imdur at this time.  I will have her stay on Crestor 10 mg every other day and check her lipid profile in 2 months.  If LDL is less than 70 I think she can continue with Crestor 10 mg every other day.  Patient sees Dr. Ruba Mccracken (PCP) at [facility].  She is agreeable to follow-up with her PCP at this time.    I will see patient as needed.      Total time spent 40 minutes including precharting, face-to-face clinic visit, review of labs/imaging and medical documentation     Rachana QUINN MD  [facility] Division of Cardiology  Pager [phone]

## 2021-06-02 NOTE — PATIENT INSTRUCTIONS
Thank you for coming to the [facility]; please note the following instructions:    1. MEDICATION CHANGES TODAY:    * START Crestor 10 mg. Every other day,Your new prescription will be at the Pharmacy you prefer.    2. You have been scheduled for a fasting lab appointment; please see following pages for appointment detail information.  Please fast for 10-12 hours prior to your scheduled lab appointment. You will be notified of your results within 7-10 business days (please see result notification details at bottom of summary).    3.Dr. Rachana Mansfield is recommending to see you on an as needed basis regarding your heart; please follow up with your primary care provider.  It was a pleasuring seeing you today at the [facility] the [facility].                        If you have any questions regarding your visit please contact your care team:     Cardiology  Telephone Number   Nadira VELAZQUEZ., RN  Chloe TAYLOR, RN   Nicole QUINTERO, RMA  Cindy RICHARDSON, RMA  Ronal GUPTA, LPN   [phone] (option 1)   For scheduling appts:     [phone] (select option 1)       For the Device Clinic (Pacemakers and ICD's)  RN's :  Joyce Burns   During business hours: [phone]    *After business hours:  [phone] (select option 4)      Normal test result notifications will be released via [employer] or mailed within 7 business days.  All other test results, will be communicated via telephone once reviewed by your cardiologist.    If you need a medication refill please contact your pharmacy.  Please allow 3 business days for your refill to be completed.    As always, thank you for trusting us with your health care needs!

## 2021-06-02 NOTE — NURSING NOTE
"Chief Complaint   Patient presents with     RECHECK     1 month follow up.        Initial BP (!) 158/88 (BP Location: Left arm, Patient Position: Chair, Cuff Size: Adult Regular)   Pulse 81   Wt 67.6 kg (149 lb)   SpO2 96%   BMI 28.15 kg/m   Estimated body mass index is 28.15 kg/m  as calculated from the following:    Height as of 5/28/21: 1.549 m (5' 1\").    Weight as of this encounter: 67.6 kg (149 lb)..  BP completed using cuff size: regular    Cindy Fam MA  "

## 2021-06-03 ENCOUNTER — MYC MEDICAL ADVICE (OUTPATIENT)
Dept: CARDIOLOGY | Facility: CLINIC | Age: 81
End: 2021-06-03

## 2021-06-22 ENCOUNTER — TELEPHONE (OUTPATIENT)
Dept: PULMONOLOGY | Facility: CLINIC | Age: 81
End: 2021-06-22

## 2021-06-22 NOTE — TELEPHONE ENCOUNTER
Contacted [facility] @[phone]   regarding CXR from 04/05/2021. Spoke with Lupe and requested that CXR be pushed to  PACS for review.    Contacted [facility] @ [phone].  left for radiology dept requesting CT chest from 10/09/2019 be pushed to  PACS for review.      Renay Francisco LPN  Pulmonary Medicine:  [facility]  Phone: [phone] Fax: [phone]

## 2021-07-01 ENCOUNTER — OFFICE VISIT (OUTPATIENT)
Dept: NURSING | Facility: CLINIC | Age: 81
End: 2021-07-01
Payer: MEDICARE

## 2021-07-01 VITALS — HEIGHT: 61 IN | OXYGEN SATURATION: 97 % | BODY MASS INDEX: 27.49 KG/M2 | HEART RATE: 84 BPM | WEIGHT: 145.6 LBS

## 2021-07-01 DIAGNOSIS — R07.89 CHEST DISCOMFORT: Primary | ICD-10-CM

## 2021-07-01 DIAGNOSIS — J43.1 PANLOBULAR EMPHYSEMA (H): Chronic | ICD-10-CM

## 2021-07-01 PROCEDURE — 94375 RESPIRATORY FLOW VOLUME LOOP: CPT | Performed by: INTERNAL MEDICINE

## 2021-07-01 PROCEDURE — 94726 PLETHYSMOGRAPHY LUNG VOLUMES: CPT | Performed by: INTERNAL MEDICINE

## 2021-07-01 PROCEDURE — 94729 DIFFUSING CAPACITY: CPT | Performed by: INTERNAL MEDICINE

## 2021-07-01 ASSESSMENT — MIFFLIN-ST. JEOR: SCORE: 1072.82

## 2021-07-07 LAB
DLCOUNC-%PRED-PRE: 79 %
DLCOUNC-PRE: 13.5 ML/MIN/MMHG
DLCOUNC-PRED: 16.99 ML/MIN/MMHG
ERV-%PRED-PRE: 119 %
ERV-PRE: 0.49 L
ERV-PRED: 0.41 L
EXPTIME-PRE: 7.72 SEC
FEF2575-%PRED-PRE: 85 %
FEF2575-PRE: 1.26 L/SEC
FEF2575-PRED: 1.48 L/SEC
FEFMAX-%PRED-PRE: 87 %
FEFMAX-PRE: 3.87 L/SEC
FEFMAX-PRED: 4.44 L/SEC
FEV1-%PRED-PRE: 96 %
FEV1-PRE: 1.7 L
FEV1FEV6-PRE: 74 %
FEV1FEV6-PRED: 78 %
FEV1FVC-PRE: 74 %
FEV1FVC-PRED: 78 %
FEV1SVC-PRE: 75 %
FEV1SVC-PRED: 71 %
FIFMAX-PRE: 2.08 L/SEC
FRCPLETH-%PRED-PRE: 120 %
FRCPLETH-PRE: 3.07 L
FRCPLETH-PRED: 2.55 L
FVC-%PRED-PRE: 99 %
FVC-PRE: 2.3 L
FVC-PRED: 2.31 L
IC-%PRED-PRE: 85 %
IC-PRE: 1.77 L
IC-PRED: 2.08 L
RVPLETH-%PRED-PRE: 124 %
RVPLETH-PRE: 2.58 L
RVPLETH-PRED: 2.07 L
TLCPLETH-%PRED-PRE: 109 %
TLCPLETH-PRE: 4.84 L
TLCPLETH-PRED: 4.44 L
VA-%PRED-PRE: 93 %
VA-PRE: 3.81 L
VC-%PRED-PRE: 90 %
VC-PRE: 2.26 L
VC-PRED: 2.49 L

## 2021-07-08 ENCOUNTER — OFFICE VISIT (OUTPATIENT)
Dept: PULMONOLOGY | Facility: CLINIC | Age: 81
End: 2021-07-08
Attending: INTERNAL MEDICINE
Payer: MEDICARE

## 2021-07-08 VITALS
SYSTOLIC BLOOD PRESSURE: 145 MMHG | OXYGEN SATURATION: 97 % | DIASTOLIC BLOOD PRESSURE: 76 MMHG | HEART RATE: 76 BPM | BODY MASS INDEX: 27.4 KG/M2 | WEIGHT: 145 LBS

## 2021-07-08 DIAGNOSIS — J43.9 PULMONARY EMPHYSEMA, UNSPECIFIED EMPHYSEMA TYPE (H): ICD-10-CM

## 2021-07-08 PROCEDURE — 99204 OFFICE O/P NEW MOD 45 MIN: CPT | Performed by: INTERNAL MEDICINE

## 2021-07-08 NOTE — PROGRESS NOTES
Pulmonary Clinic New Patient Consult  Reason for Consult: COPD  History of Present Illness  I had the pleasure of seeing Yvonne Maria, who is a pleasant 80-year-old with a history of CAD and COPD who presents for an evaluation and management of COPD.  She was previously followed at the [facility] at [place] with Dr. Az Rose who has been managing her for COPD.  To briefly review, she was diagnosed with COPD several years ago based on her symptoms.  Historically, she has been found to have significant emphysema with relatively normal PFTs.  She is on a regimen of nebulized budesonide which she takes twice a day.  She hardly uses her albuterol rescue inhaler.  Minimal symptoms during the day, shortness of breath is mild.  She previously had a chronic cough that was thought to be secondary to microaspiration for which she had a Niesen fundoplication for hernia with subsequent resolution of her cough.  She has very minimal cough now.  She denies any wheezing.  Over the last several months she has had a chest pressure radiates to her back particularly during exertion.  She underwent significant cardiac evaluation with a CTA coronary which showed significant CAD.  She underwent a left heart cath with Dr. Mansfield and no interventions were done although she did have moderate CAD.  She lifestyle modifications as well as medical treatment were recommended.  Patient has since started on a vegan diet.  Used to walk on treadmills at about 20 minutes everyday and stopped about 2 months ago.  No admissions for AECOPD's or ER visits in the last year.    Denies any fevers, no night sweats, no hemoptysis, no unintentional weight loss, no leg swellings, no palpitations.  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 No family history of lung cancer or chronic lung disease. She has a 2 dogs. Lives in a house which was built in  , no flooding issues. There are some molds in the house but she has not been exposed to.      Review of Systems:  10 of 14 systems reviewed and are negative unless otherwise stated in HPI.    Past Medical History:   Diagnosis Date     Arthritis     osteo     Cancer (H)     skin cancer     COPD (chronic obstructive pulmonary disease) (H)      Heart disease      Hypertension      PONV (postoperative nausea and vomiting)      Thyroid disease        Past Surgical History:   Procedure Laterality Date     APPENDECTOMY       BACK SURGERY       CHOLECYSTECTOMY       CV CORONARY ANGIOGRAM N/A 2021    Procedure: CV CORONARY ANGIOGRAM;  Surgeon: Moses Styles MD;  Location: [facility] CARDIAC CATH LAB     ENT SURGERY      tonsils     EYE SURGERY      cataracts     GI SURGERY      hiatel hernia repair       Family History   Problem Relation Age of Onset     Myocardial Infarction Father      Heart Disease Maternal Grandmother      Lymphoma Maternal Grandfather      Heart Disease Paternal Grandmother      Myocardial Infarction Son        Social History     Socioeconomic History     Marital status:      Spouse name: None     Number of children: None     Years of education: None     Highest education level: None   Occupational History     None   Social Needs     Financial resource strain: None     Food insecurity     Worry: None     Inability: None     Transportation needs     Medical: None     Non-medical: None   Tobacco Use     Smoking status: Former Smoker     Packs/day: 1.00     Years: 27.00     Pack years: 27.00     Types: Cigarettes     Quit date: 3/28/1986     Years since quittin.3     Smokeless tobacco: Never Used   Substance and Sexual Activity     Alcohol use: Yes     Alcohol/week: 0.0 standard drinks     Comment: Wine 5-7 glasses per week     Drug use: No     Sexual activity: Not Currently     Partners: Male   Lifestyle     Physical activity     Days per week: None     Minutes per session:  None     Stress: None   Relationships     Social connections     Talks on phone: None     Gets together: None     Attends Yazidism service: None     Active member of club or organization: None     Attends meetings of clubs or organizations: None     Relationship status: None     Intimate partner violence     Fear of current or ex partner: None     Emotionally abused: None     Physically abused: None     Forced sexual activity: None   Other Topics Concern     Parent/sibling w/ CABG, MI or angioplasty before 65F 55M? No   Social History Narrative     None         Allergies   Allergen Reactions     Lyrica [Pregabalin] Swelling         Current Outpatient Medications:      budesonide (PULMICORT) 0.5 MG/2ML neb solution, Inhale 0.5 mg into the lungs, Disp: , Rfl:      celecoxib (CELEBREX) 200 MG capsule, Take 200 mg by mouth, Disp: , Rfl:      cyanocobalamin (VITAMIN B12) 1000 MCG/ML injection, Inject 1 mL into the muscle every 30 days, Disp: , Rfl:      hydrocortisone valerate (WEST-YVROSE) 0.2 % ointment, Apply topically as needed, Disp: , Rfl:      levothyroxine (SYNTHROID,LEVOTHROID) 100 MCG tablet, Take 1 tablet (100 mcg) by mouth daily, Disp: 90 tablet, Rfl: 3     nitroGLYcerin (NITROSTAT) 0.4 MG sublingual tablet, For chest pain place 1 tablet under the tongue every 5 minutes for 3 doses. If symptoms persist 5 minutes after 1st dose call 911., Disp: 25 tablet, Rfl: 0     potassium chloride SA (K-DUR,KLOR-CON M) 20 MEQ tablet, , Disp: , Rfl: 3     predniSONE (DELTASONE) 5 MG tablet, TAKE 1 TABLET BY MOUTH TWICE DAILY, Disp: , Rfl:      traMADol (ULTRAM) 50 MG tablet, TAKE 2 TABLETS BY MOUTH EVERY 8 TO 12 HOURS AS NEEDED FOR PAIN, Disp: , Rfl:      triamterene-hydrochlorothiazide (MAXZIDE-25) 37.5-25 MG per tablet, Take 1 tablet by mouth daily, Disp: 90 tablet, Rfl: 3     Albuterol Sulfate 108 (90 BASE) MCG/ACT AEPB, Inhale 1-2 puffs into the lungs every 4 hours as needed, Disp: , Rfl:      aspirin 81 MG tablet, Take  81 mg by mouth daily, Disp: , Rfl:      esomeprazole (NEXIUM) 20 MG DR capsule, Take 20 mg by mouth every morning (before breakfast) Take 30-60 minutes before eating., Disp: , Rfl:      rosuvastatin (CRESTOR) 10 MG tablet, Take one tablet every other day., Disp: , Rfl: 0      Physical Exam:  BP (!) 145/76   Pulse 76   Wt 65.8 kg (145 lb)   SpO2 97%   BMI 27.40 kg/m    GENERAL: Well developed, well nourished, alert, and in no apparent distress.  HEENT: Normocephalic, atraumatic. PERRL, EOMI. Oral mucosa is moist. No perioral cyanosis.  NECK: supple, no masses, no thyromegaly.  RESP:  Normal respiratory effort.  CTAB.  No rales, wheezes, rhonchi.  No cyanosis or clubbing.  CV: Normal S1, S2, regular rhythm, normal rate. No murmur.  No LE edema.   ABDOMEN:  Soft, non-tender, non-distended.   SKIN: warm and dry. No rash.  NEURO: AAOx3.  Normal gait.  Fluent speech.  PSYCH: mentation appears normal.       Results:  PFTs: Reviewed and discussed with patient - Normal lung volume, normal flow, normal volume flow loops, preserved diffusion  Most Recent [facility] Pulmonary Function Testing    FVC-Pred   Date Value Ref Range Status   07/01/2021 2.31 L      FVC-Pre   Date Value Ref Range Status   07/01/2021 2.30 L      FVC-%Pred-Pre   Date Value Ref Range Status   07/01/2021 99 %      FEV1-Pre   Date Value Ref Range Status   07/01/2021 1.70 L      FEV1-%Pred-Pre   Date Value Ref Range Status   07/01/2021 96 %      FEV1FVC-Pred   Date Value Ref Range Status   07/01/2021 78 %      FEV1FVC-Pre   Date Value Ref Range Status   07/01/2021 74 %      No results found for: 20029  FEFMax-Pred   Date Value Ref Range Status   07/01/2021 4.44 L/sec      FEFMax-Pre   Date Value Ref Range Status   07/01/2021 3.87 L/sec      FEFMax-%Pred-Pre   Date Value Ref Range Status   07/01/2021 87 %      ExpTime-Pre   Date Value Ref Range Status   07/01/2021 7.72 sec      FIFMax-Pre   Date Value Ref Range Status   07/01/2021 2.08 L/sec       FEV1FEV6-Pred   Date Value Ref Range Status   07/01/2021 78 %      FEV1FEV6-Pre   Date Value Ref Range Status   07/01/2021 74 %      No results found for: 20055  Imaging (personally reviewed in clinic today): CTA Angiogram/ CT Chest 05/12/2021  1. Centrilobular emphysematous changes. No acute airspace disease.      Assessment and Plan:   COPD (Group B)  I reviewed her PFTs which reveals normal lung volume, normal flow, normal volume flow loops, preserved diffusion.  Although she has a fair amount of apical predominant emphysema, spirometric evaluation does not show any obstruction.  She did confirm that she took her dose of nebulized budesonide prior to coming in for the study which may have obscured some of the results.  She is currently on a regimen of nebulized budesonide [Pulmicort], she still continues to have occasional shortness of breath with exertion as well as chest tightness.  I explained to her that she may derive more benefit from inhaled bronchodilators instead of using nebulized steroids.  I will switch her regimen and start her on triple therapy.  Prescriptions for Breztri inhaler was given. She knows to rinse her mouth after she uses it.  Pulmonary Nodules   this has shown stability since 2018.  There are solid nodules and hence do not need any further surveillance.      Questions and concerns were answered to the patient's satisfaction.  she was provided with my contact information should new questions or concerns arise in the interim.  she should return to clinic in 6 months    I spent a total of 60 minutes face to face with Yvonne Maria during today's office visit. Over 50% of this time was spent counseling the patient and/or coordinating care regarding their pulmonary disease.      Up to date on Prevnar (2015)  Quique Linares MD  Pulmonary, Critical Care and Sleep Medicine  [email or web address]  Pager: [phone]        The above note was dictated using voice recognition  software and may include typographical errors. Please contact the author for any clarifications.

## 2021-07-08 NOTE — RESULT ENCOUNTER NOTE
Results discussed directly with patient while patient was present. Any further details documented in the note.   Quique Linares MD

## 2021-07-08 NOTE — PATIENT INSTRUCTIONS
Patient Education     What Is COPD?  COPD stands for chronic obstructive pulmonary disease. It means the airways in your lungs are blocked (obstructed). This makes it hard to breathe. You may have trouble with daily activities because of shortness of breath. Over time the shortness of breath often gets worse. This makes it harder to take care of yourself and take part in activities. Chronic bronchitis and emphysema are 2 common types of COPD.  How did I get COPD?  Most people get COPD from smoking. Cigarette smoke damages lungs. This can develop into COPD over many years.  How COPD affects you  COPD makes you work harder to breathe. Air may get trapped in the lungs. This prevents your lungs from filling completely with fresh oxygen-filled air when you breathe in (inhale). It's harder to take deep breaths, especially when you are active and start breathing faster. Over time your lungs may become enlarged, filled with air that does not transfer oxygen into the blood. These problems lead to shortness of breath (also called dyspnea). Hoarse, whistling breathing (wheezing) and a chronic cough are common. So is feeling tired and worn out (fatigue).   What happens in chronic bronchitis?    The cells in the airways make more mucus than normal. The mucus builds up, narrowing the airways. This means less air travels into and out of the lungs. The lining of the airways may also become swollen (inflamed). This causes the airways to narrow even more.  What happens in emphysema?    The small airways are damaged and lose their stretchiness. The airways collapse when you exhale. This causes air to get trapped in the air sacs. This means that less oxygen enters the blood vessels. And less oxygen is delivered to all of the cells of your body. This makes it hard to breathe.  Damage to cilia    Cilia are small hairs that line and protect the airways. Smoking damages the cilia. Damaged cilia can t sweep mucus and particles away. Some  of the cilia are destroyed. This damage makes COPD worse.  "[employer]" last reviewed this educational content on 8/1/2018 2000-2021 [employer]. All rights reserved. This information is not intended as a substitute for professional medical care. Always follow your healthcare professional's instructions.

## 2021-07-08 NOTE — NURSING NOTE
Yvonne Maria's goals for this visit include:   Chief Complaint   Patient presents with     Consult     Pulmonary emphysema,  PFT 07/01. Requested CXR from 04/05 and CT chest from 10/09/2019 be pushed to PACS for review       She requests these members of her care team be copied on today's visit information: yes    PCP: Ruba Mccracken    Referring Provider:  Rachana Mansfield MD  [address]  [place] [address]    BP (!) 145/76   Pulse 76   Wt 65.8 kg (145 lb)   SpO2 97%   BMI 27.40 kg/m      Do you need any medication refills at today's visit? no

## 2021-08-02 ENCOUNTER — LAB (OUTPATIENT)
Dept: LAB | Facility: CLINIC | Age: 81
End: 2021-08-02
Payer: MEDICARE

## 2021-08-02 DIAGNOSIS — E78.5 HYPERLIPIDEMIA, UNSPECIFIED HYPERLIPIDEMIA TYPE: Primary | ICD-10-CM

## 2021-08-02 LAB
CHOLEST SERPL-MCNC: 224 MG/DL
FASTING STATUS PATIENT QL REPORTED: YES
HDLC SERPL-MCNC: 84 MG/DL
LDLC SERPL CALC-MCNC: 118 MG/DL
NONHDLC SERPL-MCNC: 140 MG/DL
TRIGL SERPL-MCNC: 108 MG/DL

## 2021-08-02 PROCEDURE — 36415 COLL VENOUS BLD VENIPUNCTURE: CPT | Performed by: INTERNAL MEDICINE

## 2021-08-02 PROCEDURE — 80061 LIPID PANEL: CPT | Performed by: INTERNAL MEDICINE

## 2021-08-10 ENCOUNTER — TELEPHONE (OUTPATIENT)
Dept: CARDIOLOGY | Facility: CLINIC | Age: 81
End: 2021-08-10

## 2021-08-10 NOTE — CONFIDENTIAL NOTE
Spoke to patient and relayed MD's response.  Patient verbalized understanding and states she will make the change and will let us know if it is not tolerable.  If tolerable she is aware to make a lab only fasting appt in 2 months.    Nadira Carver RN  Cardiology Care Coordinator  [facility]  [phone] option 1

## 2021-08-10 NOTE — TELEPHONE ENCOUNTER
[facility] Call Center    Phone Message    May a detailed message be left on voicemail: yes     Reason for Call: Other: Pt called in stating she is returning a call from a cardio nurse. No further info given, please call her back to discuss.      Action Taken: Message routed to:  Other: [facility]    Travel Screening: Not Applicable

## 2021-10-23 ENCOUNTER — HEALTH MAINTENANCE LETTER (OUTPATIENT)
Age: 81
End: 2021-10-23

## 2021-12-10 ENCOUNTER — TELEPHONE (OUTPATIENT)
Dept: PULMONOLOGY | Facility: CLINIC | Age: 81
End: 2021-12-10
Payer: MEDICARE

## 2021-12-10 DIAGNOSIS — J43.9 PULMONARY EMPHYSEMA, UNSPECIFIED EMPHYSEMA TYPE (H): Primary | ICD-10-CM

## 2021-12-10 NOTE — TELEPHONE ENCOUNTER
Patient contacted pulmonary clinic regarding O2 concentrator. Patient states that she uses 2L of O2 @[facility] and is experiencing nasal dryness. Patient is requesting a humidifier attachment that is able to hook up to her O2 concentrator. Informed patient that an order would be sent to [facility] on Monday 12/13/2021 when Dr. Linares will be in clinic. Patient verbalized agreement.    Renay Francisco LPN  Pulmonary Medicine:  [facility]  Phone: [phone] Fax: [phone]

## 2021-12-13 NOTE — TELEPHONE ENCOUNTER
DME order for humidifier for home O2 concentrator has been faxed to [facility] @ [phone].    Renay Francisco LPN  Pulmonary Medicine:  [facility]  Phone: [phone] Fax: [phone]

## 2022-01-10 ENCOUNTER — OFFICE VISIT (OUTPATIENT)
Dept: PULMONOLOGY | Facility: CLINIC | Age: 82
End: 2022-01-10
Payer: MEDICARE

## 2022-01-10 VITALS
DIASTOLIC BLOOD PRESSURE: 72 MMHG | WEIGHT: 146 LBS | TEMPERATURE: 97.4 F | SYSTOLIC BLOOD PRESSURE: 147 MMHG | HEART RATE: 75 BPM | RESPIRATION RATE: 17 BRPM | BODY MASS INDEX: 27.56 KG/M2 | OXYGEN SATURATION: 100 % | HEIGHT: 61 IN

## 2022-01-10 DIAGNOSIS — J43.9 PULMONARY EMPHYSEMA, UNSPECIFIED EMPHYSEMA TYPE (H): Primary | ICD-10-CM

## 2022-01-10 PROCEDURE — 99214 OFFICE O/P EST MOD 30 MIN: CPT | Performed by: INTERNAL MEDICINE

## 2022-01-10 ASSESSMENT — MIFFLIN-ST. JEOR: SCORE: 1069.63

## 2022-01-10 ASSESSMENT — PAIN SCALES - GENERAL: PAINLEVEL: NO PAIN (0)

## 2022-01-10 NOTE — PROGRESS NOTES
Pulmonary Clinic Return Patient Visit  Reason for Visit: COPD  History of Present Illness  Yvonne Maria is a pleasant 81-year-old with a history of CAD and COPD who presents for a follow up of COPD.  I last saw her in clinic in 7/2021.  To briefly review, she was diagnosed with COPD. Historically, she has been found to have significant emphysema with relatively normal PFTs.  She is on a regimen of nebulized budesonide which she takes twice a day.  She hardly uses her albuterol rescue inhaler. When I saw her in clinic, she had mild symptoms and I switched her regimen to triple inhaler therapy- Breztri for ease of use and better response.   Today, she has minimal symptoms during the day, shortness of breath is mild. With Breztri, she is more reliant and her breathing is better. She denies any wheezing, chest tightness, chest pain and no leg swellings. There has been no copd flares since the last clinic visit. She takes walk without any aid to the grocery store and she has no issue with procuring her food stuffs. She is also able to perform most of her IADLs. Her daughter helps with some of the cleaning around the house. Walking on flat surface is without any issues.  Recent work up revealed CAD without any interventions. Risk and lifestyle modifications recommended.   Denies any fevers, no night sweats, no hemoptysis, no unintentional weight loss. 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 No family history of lung cancer or chronic lung disease. She has a 2 dogs. Lives in a house which was built in 1974, no flooding issues. There are some molds in the house but she has not been exposed to.    Review of Systems:  10 of 14 systems reviewed and are negative unless otherwise stated in HPI.    Past Medical History:   Diagnosis Date     Arthritis     osteo     Cancer (H)     skin cancer     COPD (chronic obstructive pulmonary  disease) (H)      Heart disease      Hypertension      PONV (postoperative nausea and vomiting)      Thyroid disease        Past Surgical History:   Procedure Laterality Date     APPENDECTOMY       BACK SURGERY       CHOLECYSTECTOMY       CV CORONARY ANGIOGRAM N/A 2021    Procedure: CV CORONARY ANGIOGRAM;  Surgeon: Moses Styles MD;  Location: [facility]     ENT SURGERY      tonsils     EYE SURGERY      cataracts     GI SURGERY      hiatel hernia repair       Family History   Problem Relation Age of Onset     Myocardial Infarction Father      Heart Disease Maternal Grandmother      Lymphoma Maternal Grandfather      Heart Disease Paternal Grandmother      Myocardial Infarction Son        Social History     Socioeconomic History     Marital status:      Spouse name: None     Number of children: None     Years of education: None     Highest education level: None   Occupational History     None   Social Needs     Financial resource strain: None     Food insecurity     Worry: None     Inability: None     Transportation needs     Medical: None     Non-medical: None   Tobacco Use     Smoking status: Former Smoker     Packs/day: 1.00     Years: 27.00     Pack years: 27.00     Types: Cigarettes     Quit date: 3/28/1986     Years since quittin.3     Smokeless tobacco: Never Used   Substance and Sexual Activity     Alcohol use: Yes     Alcohol/week: 0.0 standard drinks     Comment: Wine 5-7 glasses per week     Drug use: No     Sexual activity: Not Currently     Partners: Male   Lifestyle     Physical activity     Days per week: None     Minutes per session: None     Stress: None   Relationships     Social connections     Talks on phone: None     Gets together: None     Attends Yarsani service: None     Active member of club or organization: None     Attends meetings of clubs or organizations: None     Relationship status: None     Intimate partner violence     Fear of current or ex  partner: None     Emotionally abused: None     Physically abused: None     Forced sexual activity: None   Other Topics Concern     Parent/sibling w/ CABG, MI or angioplasty before 65F 55M? No   Social History Narrative     None         Allergies   Allergen Reactions     Lyrica [Pregabalin] Swelling         Current Outpatient Medications:      aspirin 81 MG tablet, Take 81 mg by mouth daily, Disp: , Rfl:      Budeson-Glycopyrrol-Formoterol 160-9-4.8 MCG/ACT AERO, Inhale 2 puffs into the lungs 2 times daily, Disp: 10.7 g, Rfl: 11     celecoxib (CELEBREX) 200 MG capsule, Take 200 mg by mouth, Disp: , Rfl:      cyanocobalamin (VITAMIN B12) 1000 MCG/ML injection, Inject 1 mL into the muscle every 30 days, Disp: , Rfl:      esomeprazole (NEXIUM) 20 MG DR capsule, Take 20 mg by mouth every morning (before breakfast) Take 30-60 minutes before eating., Disp: , Rfl:      Humidifier MISC, 1 Device daily, Disp: 1 each, Rfl: 0     hydrocortisone valerate (WEST-YVROSE) 0.2 % ointment, Apply topically as needed, Disp: , Rfl:      levothyroxine (SYNTHROID,LEVOTHROID) 100 MCG tablet, Take 1 tablet (100 mcg) by mouth daily, Disp: 90 tablet, Rfl: 3     nitroGLYcerin (NITROSTAT) 0.4 MG sublingual tablet, For chest pain place 1 tablet under the tongue every 5 minutes for 3 doses. If symptoms persist 5 minutes after 1st dose call 911., Disp: 25 tablet, Rfl: 0     potassium chloride SA (K-DUR,KLOR-CON M) 20 MEQ tablet, , Disp: , Rfl: 3     rosuvastatin (CRESTOR) 10 MG tablet, Take 10 mg by mouth daily, Disp: , Rfl: 0     traMADol (ULTRAM) 50 MG tablet, TAKE 2 TABLETS BY MOUTH EVERY 8 TO 12 HOURS AS NEEDED FOR PAIN, Disp: , Rfl:      triamterene-hydrochlorothiazide (MAXZIDE-25) 37.5-25 MG per tablet, Take 1 tablet by mouth daily, Disp: 90 tablet, Rfl: 3     Albuterol Sulfate 108 (90 BASE) MCG/ACT AEPB, Inhale 1-2 puffs into the lungs every 4 hours as needed (Patient not taking: Reported on 1/10/2022), Disp: , Rfl:      budesonide (PULMICORT)  "0.5 MG/2ML neb solution, Inhale 0.5 mg into the lungs (Patient not taking: Reported on 1/10/2022), Disp: , Rfl:      predniSONE (DELTASONE) 5 MG tablet, TAKE 1 TABLET BY MOUTH TWICE DAILY (Patient not taking: Reported on 1/10/2022), Disp: , Rfl:       Physical Exam:  BP (!) 147/72   Pulse 75   Temp 97.4  F (36.3  C)   Resp 17   Ht 1.549 m (5' 1\")   Wt 66.2 kg (146 lb)   SpO2 100%   BMI 27.59 kg/m    GENERAL: Well developed, well nourished, alert, and in no apparent distress.  HEENT: Normocephalic, atraumatic. PERRL, EOMI. Oral mucosa is moist. No perioral cyanosis.  NECK: supple, no masses, no thyromegaly.  RESP:  Normal respiratory effort.  CTAB.  No rales, wheezes, rhonchi.  No cyanosis or clubbing.  CV: Normal S1, S2, regular rhythm, normal rate. No murmur.  No LE edema.   ABDOMEN:  Soft, non-tender, non-distended.   SKIN: warm and dry. No rash.  NEURO: AAOx3.  Normal gait.  Fluent speech.  PSYCH: mentation appears normal.     Results:  PFTs: Reviewed and discussed with patient - Normal lung volume, normal flow, normal volume flow loops, preserved diffusion  Most Recent [facility] Pulmonary Function Testing    FVC-Pred   Date Value Ref Range Status   07/01/2021 2.31 L      FVC-Pre   Date Value Ref Range Status   07/01/2021 2.30 L      FVC-%Pred-Pre   Date Value Ref Range Status   07/01/2021 99 %      FEV1-Pre   Date Value Ref Range Status   07/01/2021 1.70 L      FEV1-%Pred-Pre   Date Value Ref Range Status   07/01/2021 96 %      FEV1FVC-Pred   Date Value Ref Range Status   07/01/2021 78 %      FEV1FVC-Pre   Date Value Ref Range Status   07/01/2021 74 %      No results found for: 20029  FEFMax-Pred   Date Value Ref Range Status   07/01/2021 4.44 L/sec      FEFMax-Pre   Date Value Ref Range Status   07/01/2021 3.87 L/sec      FEFMax-%Pred-Pre   Date Value Ref Range Status   07/01/2021 87 %      ExpTime-Pre   Date Value Ref Range Status   07/01/2021 7.72 sec      FIFMax-Pre   Date Value Ref Range Status "   07/01/2021 2.08 L/sec      FEV1FEV6-Pred   Date Value Ref Range Status   07/01/2021 78 %      FEV1FEV6-Pre   Date Value Ref Range Status   07/01/2021 74 %      No results found for: 20055  Imaging (personally reviewed in clinic today): CTA Angiogram/ CT Chest 05/12/2021  1. Centrilobular emphysematous changes. No acute airspace disease.      Assessment and Plan:   COPD (Group B)  Good control with a CAT score of 7 and she has responded favorably to Breztri inhaler. Her symptoms are stable without any flares. She does not increased SOB when she is with her dogs and there may be an allergic component. I have asked that she starts taking Zyrtec and she will like to obtain them OTC. I encouraged her to continue to remain active and take walks.  SOB is likely multifactorial as she has underlying CAD  Pulmonary Nodules   this has shown stability since 2018.  There are solid nodules and hence do not need any further surveillance.      Questions and concerns were answered to the patient's satisfaction.  she was provided with my contact information should new questions or concerns arise in the interim.  She should return to clinic in 12 months    I spent a total of 30 minutes face to face with Yvonne Maria during today's office visit. Over 50% of this time was spent counseling the patient and/or coordinating care regarding their pulmonary disease.      Up to date on Prevnar (2015)  Quique Linares MD  Pulmonary, Critical Care and Sleep Medicine  [facility]  Pager: [phone]        The above note was dictated using voice recognition software and may include typographical errors. Please contact the author for any clarifications.

## 2022-01-10 NOTE — PROGRESS NOTES
"Yvonne Maria's goals for this visit include: Return  She requests these members of her care team be copied on today's visit information: PCP    PCP: Ruba Mccracken    Referring Provider:  Ruba Mccracken MD  [facility]  [address] [place] [address]    BP (!) 147/72   Pulse 75   Temp 97.4  F (36.3  C)   Resp 17   Ht 1.549 m (5' 1\")   Wt 66.2 kg (146 lb)   SpO2 100%   BMI 27.59 kg/m      Do you need any medication refills at today's visit? N    Renay Francisco LPN  Pulmonary Medicine:  [facility]  Phone: [phone] Fax: [phone]      "

## 2022-05-17 ENCOUNTER — OFFICE VISIT (OUTPATIENT)
Dept: FAMILY MEDICINE | Facility: CLINIC | Age: 82
End: 2022-05-17
Payer: MEDICARE

## 2022-05-17 VITALS
OXYGEN SATURATION: 98 % | WEIGHT: 147.6 LBS | BODY MASS INDEX: 27.89 KG/M2 | HEART RATE: 75 BPM | TEMPERATURE: 97.6 F | SYSTOLIC BLOOD PRESSURE: 136 MMHG | DIASTOLIC BLOOD PRESSURE: 64 MMHG

## 2022-05-17 DIAGNOSIS — M81.0 OSTEOPOROSIS WITHOUT CURRENT PATHOLOGICAL FRACTURE, UNSPECIFIED OSTEOPOROSIS TYPE: ICD-10-CM

## 2022-05-17 DIAGNOSIS — E53.8 VITAMIN B12 DEFICIENCY (NON ANEMIC): ICD-10-CM

## 2022-05-17 DIAGNOSIS — N18.31 STAGE 3A CHRONIC KIDNEY DISEASE (H): ICD-10-CM

## 2022-05-17 DIAGNOSIS — M10.9 GOUT, UNSPECIFIED CAUSE, UNSPECIFIED CHRONICITY, UNSPECIFIED SITE: ICD-10-CM

## 2022-05-17 DIAGNOSIS — Z79.899 ENCOUNTER FOR LONG-TERM CURRENT USE OF MEDICATION: ICD-10-CM

## 2022-05-17 DIAGNOSIS — Z00.00 ENCOUNTER FOR MEDICAL EXAMINATION TO ESTABLISH CARE: Primary | ICD-10-CM

## 2022-05-17 DIAGNOSIS — M35.3 PMR (POLYMYALGIA RHEUMATICA) (H): ICD-10-CM

## 2022-05-17 PROBLEM — G89.29 CHRONIC MIDLINE LOW BACK PAIN WITHOUT SCIATICA: Status: ACTIVE | Noted: 2017-08-10

## 2022-05-17 PROBLEM — M54.2 CHRONIC NECK PAIN: Status: ACTIVE | Noted: 2017-08-10

## 2022-05-17 PROBLEM — M47.812 SPONDYLOSIS OF CERVICAL REGION WITHOUT MYELOPATHY OR RADICULOPATHY: Status: ACTIVE | Noted: 2017-08-10

## 2022-05-17 PROBLEM — G56.03 BILATERAL CARPAL TUNNEL SYNDROME: Status: ACTIVE | Noted: 2017-08-10

## 2022-05-17 PROBLEM — M54.50 CHRONIC MIDLINE LOW BACK PAIN WITHOUT SCIATICA: Status: ACTIVE | Noted: 2017-08-10

## 2022-05-17 PROBLEM — M19.041 PRIMARY OSTEOARTHRITIS OF BOTH HANDS: Status: ACTIVE | Noted: 2017-09-21

## 2022-05-17 PROBLEM — I25.10 CORONARY ARTERY DISEASE INVOLVING NATIVE CORONARY ARTERY OF NATIVE HEART WITHOUT ANGINA PECTORIS: Status: ACTIVE | Noted: 2017-08-15

## 2022-05-17 PROBLEM — G89.29 CHRONIC NECK PAIN: Status: ACTIVE | Noted: 2017-08-10

## 2022-05-17 PROBLEM — M25.811 SHOULDER IMPINGEMENT, RIGHT: Status: ACTIVE | Noted: 2018-05-02

## 2022-05-17 PROBLEM — M47.816 LUMBAR SPONDYLOSIS: Status: ACTIVE | Noted: 2017-09-21

## 2022-05-17 PROBLEM — M19.042 PRIMARY OSTEOARTHRITIS OF BOTH HANDS: Status: ACTIVE | Noted: 2017-09-21

## 2022-05-17 LAB
ALBUMIN SERPL-MCNC: 3.9 G/DL (ref 3.4–5)
ANION GAP SERPL CALCULATED.3IONS-SCNC: 9 MMOL/L (ref 3–14)
BASOPHILS # BLD AUTO: 0 10E3/UL (ref 0–0.2)
BASOPHILS NFR BLD AUTO: 1 %
BUN SERPL-MCNC: 15 MG/DL (ref 7–30)
CALCIUM SERPL-MCNC: 9.7 MG/DL (ref 8.5–10.1)
CHLORIDE BLD-SCNC: 94 MMOL/L (ref 94–109)
CO2 SERPL-SCNC: 29 MMOL/L (ref 20–32)
CREAT SERPL-MCNC: 1.19 MG/DL (ref 0.52–1.04)
CREAT UR-MCNC: 45 MG/DL
DEPRECATED CALCIDIOL+CALCIFEROL SERPL-MC: 87 UG/L (ref 20–75)
EOSINOPHIL # BLD AUTO: 0.3 10E3/UL (ref 0–0.7)
EOSINOPHIL NFR BLD AUTO: 3 %
ERYTHROCYTE [DISTWIDTH] IN BLOOD BY AUTOMATED COUNT: 14.3 % (ref 10–15)
GFR SERPL CREATININE-BSD FRML MDRD: 46 ML/MIN/1.73M2
GLUCOSE BLD-MCNC: 124 MG/DL (ref 70–99)
HCT VFR BLD AUTO: 43.6 % (ref 35–47)
HGB BLD-MCNC: 14.5 G/DL (ref 11.7–15.7)
LYMPHOCYTES # BLD AUTO: 1.1 10E3/UL (ref 0.8–5.3)
LYMPHOCYTES NFR BLD AUTO: 14 %
MCH RBC QN AUTO: 28.8 PG (ref 26.5–33)
MCHC RBC AUTO-ENTMCNC: 33.3 G/DL (ref 31.5–36.5)
MCV RBC AUTO: 87 FL (ref 78–100)
MICROALBUMIN UR-MCNC: 8 MG/L
MICROALBUMIN/CREAT UR: 17.78 MG/G CR (ref 0–25)
MONOCYTES # BLD AUTO: 1 10E3/UL (ref 0–1.3)
MONOCYTES NFR BLD AUTO: 12 %
NEUTROPHILS # BLD AUTO: 5.8 10E3/UL (ref 1.6–8.3)
NEUTROPHILS NFR BLD AUTO: 71 %
PHOSPHATE SERPL-MCNC: 2.5 MG/DL (ref 2.5–4.5)
PLATELET # BLD AUTO: 363 10E3/UL (ref 150–450)
POTASSIUM BLD-SCNC: 3.2 MMOL/L (ref 3.4–5.3)
RBC # BLD AUTO: 5.03 10E6/UL (ref 3.8–5.2)
SODIUM SERPL-SCNC: 132 MMOL/L (ref 133–144)
VIT B12 SERPL-MCNC: 479 PG/ML (ref 193–986)
WBC # BLD AUTO: 8.2 10E3/UL (ref 4–11)

## 2022-05-17 PROCEDURE — 85025 COMPLETE CBC W/AUTO DIFF WBC: CPT | Performed by: FAMILY MEDICINE

## 2022-05-17 PROCEDURE — 82306 VITAMIN D 25 HYDROXY: CPT | Performed by: FAMILY MEDICINE

## 2022-05-17 PROCEDURE — 80069 RENAL FUNCTION PANEL: CPT | Performed by: FAMILY MEDICINE

## 2022-05-17 PROCEDURE — 99204 OFFICE O/P NEW MOD 45 MIN: CPT | Performed by: FAMILY MEDICINE

## 2022-05-17 PROCEDURE — 82607 VITAMIN B-12: CPT | Performed by: FAMILY MEDICINE

## 2022-05-17 PROCEDURE — 82043 UR ALBUMIN QUANTITATIVE: CPT | Performed by: FAMILY MEDICINE

## 2022-05-17 PROCEDURE — 80306 DRUG TEST PRSMV INSTRMNT: CPT | Performed by: FAMILY MEDICINE

## 2022-05-17 PROCEDURE — 36415 COLL VENOUS BLD VENIPUNCTURE: CPT | Performed by: FAMILY MEDICINE

## 2022-05-17 RX ORDER — BUDESONIDE, GLYCOPYRROLATE, AND FORMOTEROL FUMARATE 160; 9; 4.8 UG/1; UG/1; UG/1
AEROSOL, METERED RESPIRATORY (INHALATION)
COMMUNITY
Start: 2021-07-09 | End: 2024-01-30

## 2022-05-17 RX ORDER — SODIUM FLUORIDE 6 MG/ML
PASTE, DENTIFRICE DENTAL
COMMUNITY
Start: 2022-02-15 | End: 2024-07-09

## 2022-05-17 NOTE — PROGRESS NOTES
Assessment & Plan       ICD-10-CM    1. Encounter for medical examination to establish care  Z00.00    2. Stage 3a chronic kidney disease (H)  N18.31 REVIEW OF HEALTH MAINTENANCE PROTOCOL ORDERS     Adult Nephrology  Referral     Renal panel (Alb, BUN, Ca, Cl, CO2, Creat, Gluc, Phos, K, Na)     Renal panel (Alb, BUN, Ca, Cl, CO2, Creat, Gluc, Phos, K, Na)   3. PMR (polymyalgia rheumatica) (H)  M35.3    4. Gout, unspecified cause, unspecified chronicity, unspecified site  M10.9    5. Vitamin B12 deficiency (non anemic)  E53.8 CBC with Platelets & Differential     CBC with Platelets & Differential   6. Osteoporosis without current pathological fracture, unspecified osteoporosis type  M81.0 Vitamin B12     Vitamin D Deficiency     Albumin Random Urine Quantitative with Creat Ratio     Vitamin B12     Vitamin D Deficiency     Albumin Random Urine Quantitative with Creat Ratio   7. Encounter for long-term current use of medication  Z79.899 [id number] - Urine Drugs of Abuse Panel 13 - (Screening) - ([facility] Only)     [id number] - Urine Drugs of Abuse Panel 13 - (Screening) - ([facility] Only)         Review of external notes as documented elsewhere in note  I spent a total of 51 minutes on the day of the visit.   Time spent doing chart review, history and exam, documentation and further activities per the note        Patient Instructions       No follow-ups on file.    Blair Dominguez MD  [employer] RENE Hurley is a 81 year old who presents for the following health issues     Chief Complaint   Patient presents with     Referral     Nephrologist        History of Present Illness       CKD: She uses over the counter pain medication, including IB, a few times a month.    She eats 2-3 servings of fruits and vegetables daily.She consumes 0 sweetened beverage(s) daily.She exercises with enough effort to increase her heart rate 10 to 19 minutes per day.  She exercises with enough effort to increase  her heart rate 3 or less days per week.   She is taking medications regularly.     Patient presents to [facility] care  Unhappy that she wasn't able to get nephrology appointment   Needs new referral  Lab trends reviewed with patient  Medical history reviewed    B12 deficiency with anemia  Monthly injections done at home  Levels have been normal    GERD  History of hiatal hernia repair  Takes H2 blockers    Osteoporosis  Untreated  No fracture history      Review of Systems   Constitutional, HEENT, cardiovascular, pulmonary, gi and gu systems are negative, except as otherwise noted.      Objective    /64   Pulse 75   Temp 97.6  F (36.4  C) (Oral)   Wt 67 kg (147 lb 9.6 oz)   SpO2 98%   BMI 27.89 kg/m    Body mass index is 27.89 kg/m .  Physical Exam  Vitals reviewed.   Constitutional:       General: She is not in acute distress.     Appearance: Normal appearance. She is well-developed.   HENT:      Head: Normocephalic and atraumatic.      Right Ear: External ear normal.      Left Ear: External ear normal.      Nose: Nose normal.   Eyes:      General: No scleral icterus.     Conjunctiva/sclera: Conjunctivae normal.   Cardiovascular:      Rate and Rhythm: Normal rate.   Pulmonary:      Effort: Pulmonary effort is normal.   Musculoskeletal:         General: No deformity. Normal range of motion.      Cervical back: Normal range of motion.   Skin:     General: Skin is warm and dry.      Findings: No rash.   Neurological:      Mental Status: She is alert and oriented to person, place, and time.   Psychiatric:         Behavior: Behavior normal.         Thought Content: Thought content normal.         Judgment: Judgment normal.

## 2022-05-17 NOTE — LETTER
Opioid / Opioid Plus Controlled Substance Agreement    This is an agreement between you and your provider about the safe and appropriate use of controlled substance/opioids prescribed by your care team. Controlled substances are medicines that can cause physical and mental dependence (abuse).    There are strict laws about having and using these medicines. We here at [facility] are committing to working with you in your efforts to get better. To support you in this work, we ll help you schedule regular office appointments for medicine refills. If we must cancel or change your appointment for any reason, we ll make sure you have enough medicine to last until your next appointment.     As a Provider, I will:    Listen carefully to your concerns and treat you with respect.     Recommend a treatment plan that I believe is in your best interest. This plan may involve therapies other than opioid pain medication.     Talk with you often about the possible benefits, and the risk of harm of any medicine that we prescribe for you.     Provide a plan on how to taper (discontinue or go off) using this medicine if the decision is made to stop its use.    As a Patient, I understand that opioid(s):     Are a controlled substance prescribed by my care team to help me function or work and manage my condition(s).     Are strong medicines and can cause serious side effects such as:    Drowsiness, which can seriously affect my driving ability    A lower breathing rate, enough to cause death    Harm to my thinking ability     Depression     Abuse of and addiction to this medicine    Need to be taken exactly as prescribed. Combining opioids with certain medicines or chemicals (such as illegal drugs, sedatives, sleeping pills, and benzodiazepines) can be dangerous or even fatal. If I stop opioids suddenly, I may have severe withdrawal symptoms.    Do not work for all types of pain nor for all patients. If they re not helpful, I may  be asked to stop them.        The risks, benefits and side effects of these medicine(s) were explained to me. I agree that:  1. I will take part in other treatments as advised by my care team. This may be psychiatry or counseling, physical therapy, behavioral therapy, group treatment or a referral to a specialist.     2. I will keep all my appointments. I understand that this is part of the monitoring of opioids. My care team may require an office visit for EVERY opioid/controlled substance refill. If I miss appointments or don t follow instructions, my care team may stop my medicine.    3. I will take my medicines as prescribed. I will not change the dose or schedule unless my care team tells me to. There will be no refills if I run out early.     4. I may be asked to come to the clinic and complete a urine drug test or complete a pill count at any time. If I don t give a urine sample or participate in a pill count, the care team may stop my medicine.    5. I will only receive prescriptions from this clinic for chronic pain. If I am treated by another provider for acute pain issues, I will tell them that I am taking opioid pain medication for chronic pain and that I have a treatment agreement with this provider. I will inform my [facility] care team within one business day if I am given a prescription for any pain medication by another healthcare provider. My [facility] care team can contact other providers and pharmacists about my use of any medicines.    6. It is up to me to make sure that I don t run out of my medicines on weekends or holidays. If my care team is willing to refill my opioid prescription without a visit, I must request refills only during office hours. Refills may take up to 3 business days to process. I will use one pharmacy to fill all my opioid and other controlled substance prescriptions. I will notify the clinic about any changes to my insurance or medication  availability.    7. I am responsible for my prescriptions. If the medicine/prescription is lost, stolen or destroyed, it will not be replaced. I also agree not to share controlled substance medicines with anyone.    8. I am aware I should not use any illegal or recreational drugs. I agree not to drink alcohol unless my care team says I can.       9. If I enroll in the [place] [facility] program, I will tell my care team prior to my next refill.     10. I will tell my care team right away if I become pregnant, have a new medical problem treated outside of my regular clinic, or have a change in my medications.    11. I understand that this medicine can affect my thinking, judgment and reaction time. Alcohol and drugs affect the brain and body, which can affect the safety of my driving. Being under the influence of alcohol or drugs can affect my decision-making, behaviors, personal safety, and the safety of others. Driving while impaired (DWI) can occur if a person is driving, operating, or in physical control of a car, motorcycle, boat, snowmobile, ATV, motorbike, off-road vehicle, or any other motor vehicle ([place] Statute 169A.20). I understand the risk if I choose to drive or operate any vehicle or machinery.    I understand that if I do not follow any of the conditions above, my prescriptions or treatment may be stopped or changed.          Opioids  What You Need to Know    What are opioids?   Opioids are pain medicines that must be prescribed by a doctor. They are also known as narcotics.     Examples are:   1. morphine (MS Contin, Lily)  2. oxycodone (Oxycontin)  3. oxycodone and acetaminophen (Percocet)  4. hydrocodone and acetaminophen (Vicodin, Norco)   5. fentanyl patch (Duragesic)   6. hydromorphone (Dilaudid)   7. methadone  8. codeine (Tylenol #3)     What do opioids do well?   Opioids are best for severe short-term pain such as after a surgery or injury. They may work well for cancer pain. They may  help some people with long-lasting (chronic) pain.     What do opioids NOT do well?   Opioids never get rid of pain entirely, and they don t work well for most patients with chronic pain. Opioids don t reduce swelling, one of the causes of pain.                                    Other ways to manage chronic pain and improve function include:       Treat the health problem that may be causing pain    Anti-inflammation medicines, which reduce swelling and tenderness, such as ibuprofen (Advil, Motrin) or naproxen (Aleve)    Acetaminophen (Tylenol)    Antidepressants and anti-seizure medicines, especially for nerve pain    Topical treatments such as patches or creams    Injections or nerve blocks    Chiropractic or osteopathic treatment    Acupuncture, massage, deep breathing, meditation, visual imagery, aromatherapy    Use heat or ice at the pain site    Physical therapy     Exercise    Stop smoking    Take part in therapy       Risks and side effects     Talk to your doctor before you start or decide to keep taking opioids. Possible side effects include:      Lowering your breathing rate enough to cause death    Overdose, including death, especially if taking higher than prescribed doses    Worse depression symptoms; less pleasure in things you usually enjoy    Feeling tired or sluggish    Slower thoughts or cloudy thinking    Being more sensitive to pain over time; pain is harder to control    Trouble sleeping or restless sleep    Changes in hormone levels (for example, less testosterone)    Changes in sex drive or ability to have sex    Constipation    Unsafe driving    Itching and sweating    Dizziness    Nausea, throwing up and dry mouth    What else should I know about opioids?    Opioids may lead to dependence, tolerance, or addiction.      Dependence means that if you stop or reduce the medicine too quickly, you will have withdrawal symptoms. These include loose poop (diarrhea), jitters, flu-like symptoms,  nervousness and tremors. Dependence is not the same as addiction.                       Tolerance means needing higher doses over time to get the same effect. This may increase the chance of serious side effects.      Addiction is when people improperly use a substance that harms their body, their mind or their relations with others. Use of opiates can cause a relapse of addiction if you have a history of drug or alcohol abuse.      People who have used opioids for a long time may have a lower quality of life, worse depression, higher levels of pain and more visits to doctors.    You can overdose on opioids. Take these steps to lower your risk of overdose:    1. Recognize the signs:  Signs of overdose include decrease or loss of consciousness (blackout), slowed breathing, trouble waking up and blue lips. If someone is worried about overdose, they should call 911.    2. Talk to your doctor about Narcan (naloxone).   If you are at risk for overdose, you may be given a prescription for Narcan. This medicine very quickly reverses the effects of opioids.   If you overdose, a friend or family member can give you Narcan while waiting for the ambulance. They need to know the signs of overdose and how to give Narcan.     3. Don't use alcohol or street drugs.   Taking them with opioids can cause death.    4. Do not take any of these medicines unless your doctor says it s OK. Taking these with opioids can cause death:    Benzodiazepines, such as lorazepam (Ativan), alprazolam (Xanax) or diazepam (Valium)    Muscle relaxers, such as cyclobenzaprine (Flexeril)    Sleeping pills like zolpidem (Ambien)     Other opioids      How to keep you and other people safe while taking opioids:    1. Never share your opioids with others.  Opioid medicines are regulated by the Drug Enforcement Agency ([employer]). Selling or sharing medications is a criminal act.    2. Be sure to store opioids in a secure place, locked up if possible. Young children  can easily swallow them and overdose.    3. When you are traveling with your medicines, keep them in the original bottles. If you use a pill box, be sure you also carry a copy of your medicine list from your clinic or pharmacy.    4. Safe disposal of opioids    Most pharmacies have places to get rid of medicine, called disposal kiosks. Medicine disposal options are also available in every [facility]. Search your county and  medication disposal  to find more options. You can find more details at:  [email or web address]     I agree that my provider, clinic care team, and pharmacy may work with any city, state or federal law enforcement agency that investigates the misuse, sale, or other diversion of my controlled medicine. I will allow my provider to discuss my care with, or share a copy of, this agreement with any other treating provider, pharmacy or emergency room where I receive care.    I have read this agreement and have asked questions about anything I did not understand.    _______________________________________________________  Patient Signature - Yvonne Maria _____________________                   Date     _______________________________________________________  Provider Signature - Blair Dominguez MD   _____________________                   Date     _______________________________________________________  Witness Signature (required if provider not present while patient signing)   _____________________                   Date

## 2022-05-18 ENCOUNTER — TELEPHONE (OUTPATIENT)
Dept: NEPHROLOGY | Facility: CLINIC | Age: 82
End: 2022-05-18
Payer: MEDICARE

## 2022-05-18 ENCOUNTER — LAB (OUTPATIENT)
Dept: LAB | Facility: CLINIC | Age: 82
End: 2022-05-18
Payer: MEDICARE

## 2022-05-18 DIAGNOSIS — I10 BENIGN ESSENTIAL HYPERTENSION: Primary | ICD-10-CM

## 2022-05-18 DIAGNOSIS — E55.9 VITAMIN D DEFICIENCY, UNSPECIFIED: ICD-10-CM

## 2022-05-18 DIAGNOSIS — I10 BENIGN ESSENTIAL HYPERTENSION: ICD-10-CM

## 2022-05-18 LAB
ALBUMIN SERPL-MCNC: 3.8 G/DL (ref 3.4–5)
ALBUMIN UR-MCNC: NEGATIVE MG/DL
AMPHETAMINES UR QL: NOT DETECTED
ANION GAP SERPL CALCULATED.3IONS-SCNC: 9 MMOL/L (ref 3–14)
APPEARANCE UR: CLEAR
BACTERIA #/AREA URNS HPF: ABNORMAL /HPF
BARBITURATES UR QL SCN: NOT DETECTED
BENZODIAZ UR QL SCN: NOT DETECTED
BILIRUB UR QL STRIP: NEGATIVE
BUN SERPL-MCNC: 13 MG/DL (ref 7–30)
BUPRENORPHINE UR QL: NOT DETECTED
CALCIUM SERPL-MCNC: 10.7 MG/DL (ref 8.5–10.1)
CANNABINOIDS UR QL: NOT DETECTED
CHLORIDE BLD-SCNC: 96 MMOL/L (ref 94–109)
CO2 SERPL-SCNC: 28 MMOL/L (ref 20–32)
COCAINE UR QL SCN: NOT DETECTED
COLOR UR AUTO: YELLOW
CREAT SERPL-MCNC: 1.29 MG/DL (ref 0.52–1.04)
CREAT UR-MCNC: 102 MG/DL
D-METHAMPHET UR QL: NOT DETECTED
ERYTHROCYTE [DISTWIDTH] IN BLOOD BY AUTOMATED COUNT: 14.2 % (ref 10–15)
GFR SERPL CREATININE-BSD FRML MDRD: 42 ML/MIN/1.73M2
GLUCOSE BLD-MCNC: 113 MG/DL (ref 70–99)
GLUCOSE UR STRIP-MCNC: NEGATIVE MG/DL
HCT VFR BLD AUTO: 43.2 % (ref 35–47)
HGB BLD-MCNC: 14.3 G/DL (ref 11.7–15.7)
HGB UR QL STRIP: NEGATIVE
KETONES UR STRIP-MCNC: NEGATIVE MG/DL
LEUKOCYTE ESTERASE UR QL STRIP: NEGATIVE
MCH RBC QN AUTO: 29 PG (ref 26.5–33)
MCHC RBC AUTO-ENTMCNC: 33.1 G/DL (ref 31.5–36.5)
MCV RBC AUTO: 88 FL (ref 78–100)
METHADONE UR QL SCN: NOT DETECTED
NITRATE UR QL: NEGATIVE
OPIATES UR QL SCN: NOT DETECTED
OXYCODONE UR QL SCN: NOT DETECTED
PCP UR QL SCN: NOT DETECTED
PH UR STRIP: 7.5 [PH] (ref 5–7)
PHOSPHATE SERPL-MCNC: 2.7 MG/DL (ref 2.5–4.5)
PLATELET # BLD AUTO: 407 10E3/UL (ref 150–450)
POTASSIUM BLD-SCNC: 3.1 MMOL/L (ref 3.4–5.3)
PROPOXYPH UR QL: NOT DETECTED
PROT UR-MCNC: 0.09 G/L
PROT/CREAT 24H UR: 0.09 G/G CR (ref 0–0.2)
PTH-INTACT SERPL-MCNC: 56 PG/ML (ref 18–80)
RBC # BLD AUTO: 4.93 10E6/UL (ref 3.8–5.2)
RBC #/AREA URNS AUTO: ABNORMAL /HPF
SODIUM SERPL-SCNC: 133 MMOL/L (ref 133–144)
SP GR UR STRIP: 1.01 (ref 1–1.03)
SQUAMOUS #/AREA URNS AUTO: ABNORMAL /LPF
TRICYCLICS UR QL SCN: NOT DETECTED
UROBILINOGEN UR STRIP-ACNC: 0.2 E.U./DL
WBC # BLD AUTO: 9 10E3/UL (ref 4–11)
WBC #/AREA URNS AUTO: ABNORMAL /HPF

## 2022-05-18 PROCEDURE — 36415 COLL VENOUS BLD VENIPUNCTURE: CPT

## 2022-05-18 PROCEDURE — 81001 URINALYSIS AUTO W/SCOPE: CPT

## 2022-05-18 PROCEDURE — 82306 VITAMIN D 25 HYDROXY: CPT

## 2022-05-18 PROCEDURE — 80069 RENAL FUNCTION PANEL: CPT

## 2022-05-18 PROCEDURE — 83970 ASSAY OF PARATHORMONE: CPT

## 2022-05-18 PROCEDURE — 84156 ASSAY OF PROTEIN URINE: CPT

## 2022-05-18 PROCEDURE — 85027 COMPLETE CBC AUTOMATED: CPT

## 2022-05-18 NOTE — PROGRESS NOTES
Nephrology Progress Note  05/18/2022   Chief complaint: CKD stage 3  History of Present Illness:    The patient is a 81-year-old female with history of arthritis, skin cancer, COPD,CAD,  hypertension who is here for chronic kidney disease consult.    The patient has normal creatinine back in 2012 with baseline creatinine of 0.8-0.9.  She started to have elevated creatinine in the range of 1.1-1.3 back in 2013.  And since then creatinine has been progressively increased and fluctuating between 1.1-1.3.  Her labs on 4/29/2022 from [facility] showed creatinine of 1.41.  And at that time she was worried and started to make some changes about her diet.  She stopped taking vitamin D and reduce the amount of potassium and phosphorus intake.  She also stopped taking potassium pills.  The most recent creatinine on 5/18/2022 showed creatinine of 1.29 with EGFR 42. UA on 5/18/2022 showed no red blood cell and no protein.UPCR is normal, 0.09.       She has history celiac disease and vegetarian. She has HTN for about 15-20 years ago. She was on water pills in the past as she was having fluid retention.  She thought it was related to progesterone. She stopped taking it for 5 years and resumed taking it later. Last year, another diuretics was that for the Meniere's. She was in [place] study in the [place] and it turns out that the patient was in the placebo arm.  The patient has chronic joints and body pain and she typically takes tramadol and sometimes Celebrex but she told me that she rarely takes it.  She thought that the chronic pain could be from fibromyalgia. She sometimes take tylenol. She stop taking statin because she has lots of muscle ache.She has no leg swelling. She has no problem with urination. She never has heart attack. She has intermittent chest pain. She has no hematuria or stone history. She has CT angiogram of coronary artery in 5/21 which showed severe mid RCA stenosis and moderate stenosis in the proximal to  mid LAD.    She smokes 1 PPD for 28 years and quit when she was 47 years old. She drinks rarely. She has 3 children. She is . She used to work for an [employer] but now she is retired.   She has no family Hx of kidney disease.   Regarding hypertension, she is on Maxzide (Triamterene 37.5-hydrochlorothiazide 25) 1 tab daily.      Past medical history  Past Medical History:   Diagnosis Date     Arthritis     osteo     Cancer (H)     skin cancer     COPD (chronic obstructive pulmonary disease) (H)      Heart disease      Hypertension      PONV (postoperative nausea and vomiting)      Thyroid disease        Past surgical history  Past Surgical History:   Procedure Laterality Date     APPENDECTOMY       BACK SURGERY       CHOLECYSTECTOMY       CV CORONARY ANGIOGRAM N/A 5/28/2021    Procedure: CV CORONARY ANGIOGRAM;  Surgeon: Moses Styles MD;  Location:  [facility]     ENT SURGERY      tonsils     EYE SURGERY      cataracts     GI SURGERY      hiatel hernia repair     Review of Systems:   14 systems were reviewed and all negative except as mentioned above.   Current Medications:  Current Outpatient Medications   Medication     Budeson-Glycopyrrol-Formoterol (BREZTRI AEROSPHERE) 160-9-4.8 MCG/ACT AERO     celecoxib (CELEBREX) 200 MG capsule     cyanocobalamin (VITAMIN B12) 1000 MCG/ML injection     esomeprazole (NEXIUM) 20 MG DR capsule     Humidifier MISC     hydrocortisone valerate (WEST-YVROSE) 0.2 % ointment     levothyroxine (SYNTHROID,LEVOTHROID) 100 MCG tablet     nitroGLYcerin (NITROSTAT) 0.4 MG sublingual tablet     PREVIDENT 5000 BOOSTER PLUS 1.1 % PSTE     traMADol (ULTRAM) 50 MG tablet     triamterene-hydrochlorothiazide (MAXZIDE-25) 37.5-25 MG per tablet     No current facility-administered medications for this visit.       Physical Exam:   There were no vitals taken for this visit.   There is no height or weight on file to calculate BMI.    GENERAL APPEARANCE: Alert, not in acute  distress, pleasant  EYES:  Not pale conjunctiva, pupils equal  HENT: Mouth without ulcers or lesions  PULM: lungs clear to auscultation bilaterally, equal air movement, no clubbing  CV: regular rhythm, normal rate, no rub     -edema: none  GI: soft,  - tender, no distended, bowel sounds are present  INTEGUMENT: No rash  NEURO:  Non focal. No asterixis.     Labs:   All labs reviewed by me  Last Renal Panel:  Sodium   Date Value Ref Range Status   05/17/2022 132 (L) 133 - 144 mmol/L Final   05/28/2021 138 133 - 144 mmol/L Final     Potassium   Date Value Ref Range Status   05/17/2022 3.2 (L) 3.4 - 5.3 mmol/L Final   05/28/2021 4.4 3.4 - 5.3 mmol/L Final     Chloride   Date Value Ref Range Status   05/17/2022 94 94 - 109 mmol/L Final   05/28/2021 103 94 - 109 mmol/L Final     Carbon Dioxide   Date Value Ref Range Status   05/28/2021 30 20 - 32 mmol/L Final     Carbon Dioxide (CO2)   Date Value Ref Range Status   05/17/2022 29 20 - 32 mmol/L Final     Anion Gap   Date Value Ref Range Status   05/17/2022 9 3 - 14 mmol/L Final   05/28/2021 4 3 - 14 mmol/L Final     Glucose   Date Value Ref Range Status   05/17/2022 124 (H) 70 - 99 mg/dL Final   05/28/2021 113 (H) 70 - 99 mg/dL Final     Urea Nitrogen   Date Value Ref Range Status   05/17/2022 15 7 - 30 mg/dL Final   05/28/2021 19 7 - 30 mg/dL Final     Creatinine   Date Value Ref Range Status   05/17/2022 1.19 (H) 0.52 - 1.04 mg/dL Final   05/28/2021 1.14 (H) 0.52 - 1.04 mg/dL Final     GFR Estimate   Date Value Ref Range Status   05/17/2022 46 (L) >60 mL/min/1.73m2 Final     Comment:     Effective December 21, 2021 eGFRcr in adults is calculated using the 2021 CKD-EPI creatinine equation which includes age and gender (Robbie moyer al., NEJM, DOI: 10.1056/BTOGvy7087907)   05/28/2021 45 (L) >60 mL/min/[1.73_m2] Final     Comment:     Non  GFR Calc  Starting 12/18/2018, serum creatinine based estimated GFR (eGFR) will be   calculated using the Chronic Kidney  Disease Epidemiology Collaboration   (CKD-EPI) equation.       Calcium   Date Value Ref Range Status   05/17/2022 9.7 8.5 - 10.1 mg/dL Final   05/28/2021 9.9 8.5 - 10.1 mg/dL Final     Phosphorus   Date Value Ref Range Status   05/17/2022 2.5 2.5 - 4.5 mg/dL Final     Albumin   Date Value Ref Range Status   05/17/2022 3.9 3.4 - 5.0 g/dL Final       Imaging:  I reviewed imaging studies.     Assessment & Recommendations:   Problem list  # CKD stage 3A secondary to chronic hypertension and NSAIDs use  The patient started to have creatinine elevation at least since 2013.  But over the years creatinine has not changed much.  Currently her creatinine has been hovering between 1.1-1.4.  Her current EGFR is 42-46.  Etiology of her chronic kidney disease likely multifactorial in the setting of chronic hypertension and NSAID use.  She is an ex-smoker which can also contribute to kidney injury as well.  At this time does not seem like her chronic kidney disease has rapid progression.  Her lab is quite stable except mild hyponatremia and hypokalemia.  Her calcium is also a little bit elevated at 10.7.  Her UA is pretty clean without blood no protein.  The patient does not have any kidney imaging.  I do not think that we need to do any GN work-up at this time because my suspicion is low.  But I will order a kidney ultrasound to look for any anatomy, abnormalities.  I also discussed with her how to keep her kidney healthy which include keep her blood pressure in a good range, stay well-hydrated and stay away from nephrotoxic agents such as NSAID.  - US kidneys without Doppler  - Refer to patient for dietitian  # Hypokalemia  # Mild intermittent hyponatremia  This is likely in the setting of diuretics induced.  Her blood pressure today is 130/75.  I decided to stop Maxzide and start her on low-dose amlodipine 2.5 mg daily.  She will let us know the blood pressure in 2 weeks.  Discussed potential side effects with her which include  potential lower extremity edema.  # HTN  Blood pressure is excellent today at 130/75.  She discussed low-salt diet.  Her plan for blood pressure medication as outlined above.  #Mild hypercalcemia  #Hyper Vitamin D  PTH is 56.  Vitamin D level was 82.  She used to take high-dose vitamin D but now stopped for 2 weeks.  I asked her to continue to remain stopping vitamin D.  I have asked her to continue to drink fluids.  Repeat calcium next visit.  #Anemia surveillance  Hemoglobin is 14.3.  #History of hiatal hernia  #Chronic pain on tramadol    Follow-up in 6 months with labs    I spent  90 minutes on the date of the encounter doing chart review, history and exam, documentation and further activities as noted above. 40 minutes of this visit is dedicated to direct patient interaction via face to face.     Patricia Porras MD on 5/19/22

## 2022-05-19 ENCOUNTER — OFFICE VISIT (OUTPATIENT)
Dept: NEPHROLOGY | Facility: CLINIC | Age: 82
End: 2022-05-19
Attending: INTERNAL MEDICINE
Payer: MEDICARE

## 2022-05-19 VITALS
DIASTOLIC BLOOD PRESSURE: 75 MMHG | WEIGHT: 147.7 LBS | BODY MASS INDEX: 27.91 KG/M2 | HEART RATE: 80 BPM | SYSTOLIC BLOOD PRESSURE: 130 MMHG | OXYGEN SATURATION: 97 %

## 2022-05-19 DIAGNOSIS — I10 HYPERTENSION, ESSENTIAL: ICD-10-CM

## 2022-05-19 DIAGNOSIS — N18.31 STAGE 3A CHRONIC KIDNEY DISEASE (H): ICD-10-CM

## 2022-05-19 DIAGNOSIS — N18.32 CHRONIC KIDNEY DISEASE, STAGE 3B (H): Primary | ICD-10-CM

## 2022-05-19 LAB — DEPRECATED CALCIDIOL+CALCIFEROL SERPL-MC: 82 UG/L (ref 20–75)

## 2022-05-19 PROCEDURE — 99205 OFFICE O/P NEW HI 60 MIN: CPT | Performed by: INTERNAL MEDICINE

## 2022-05-19 PROCEDURE — 99417 PROLNG OP E/M EACH 15 MIN: CPT | Performed by: INTERNAL MEDICINE

## 2022-05-19 RX ORDER — AMLODIPINE BESYLATE 2.5 MG/1
5 TABLET ORAL DAILY
Qty: 30 TABLET | Refills: 6 | Status: SHIPPED | OUTPATIENT
Start: 2022-05-19 | End: 2022-06-30

## 2022-05-19 NOTE — NURSING NOTE
Chief Complaint   Patient presents with     Consult     New patient consult     Blood pressure 130/75, pulse 80, weight 67 kg (147 lb 11.2 oz), SpO2 97 %, not currently breastfeeding.    Roro Hebert on 5/19/2022 at 10:56 AM

## 2022-05-19 NOTE — PATIENT INSTRUCTIONS
We will get a kidney ultrasound.  We will stop taking vitamin D.  Stop diuretics.  Will start Amlodipine 2.5 mg daily and update Blood pressure in 2 weeks  Will refer you to see dietician  
97.6

## 2022-05-19 NOTE — LETTER
5/19/2022       RE: Yvonne Maria  [address] [address]     Dear Colleague,    Thank you for referring your patient, Yvonne Maria, to the [facility] [place] at [facility]. Please see a copy of my visit note below.      Nephrology Progress Note  05/18/2022   Chief complaint: CKD stage 3  History of Present Illness:    The patient is a 81-year-old female with history of arthritis, skin cancer, COPD,CAD,  hypertension who is here for chronic kidney disease consult.    The patient has normal creatinine back in 2012 with baseline creatinine of 0.8-0.9.  She started to have elevated creatinine in the range of 1.1-1.3 back in 2013.  And since then creatinine has been progressively increased and fluctuating between 1.1-1.3.  Her labs on 4/29/2022 from [facility] showed creatinine of 1.41.  And at that time she was worried and started to make some changes about her diet.  She stopped taking vitamin D and reduce the amount of potassium and phosphorus intake.  She also stopped taking potassium pills.  The most recent creatinine on 5/18/2022 showed creatinine of 1.29 with EGFR 42. UA on 5/18/2022 showed no red blood cell and no protein.UPCR is normal, 0.09.       She has history celiac disease and vegetarian. She has HTN for about 15-20 years ago. She was on water pills in the past as she was having fluid retention.  She thought it was related to progesterone. She stopped taking it for 5 years and resumed taking it later. Last year, another diuretics was that for the Meniere's. She was in [place] study in the [place] and it turns out that the patient was in the placebo arm.  The patient has chronic joints and body pain and she typically takes tramadol and sometimes Celebrex but she told me that she rarely takes it.  She thought that the chronic pain could be from fibromyalgia. She sometimes take tylenol. She stop taking statin  because she has lots of muscle ache.She has no leg swelling. She has no problem with urination. She never has heart attack. She has intermittent chest pain. She has no hematuria or stone history. She has CT angiogram of coronary artery in 5/21 which showed severe mid RCA stenosis and moderate stenosis in the proximal to mid LAD.    She smokes 1 PPD for 28 years and quit when she was 47 years old. She drinks rarely. She has 3 children. She is . She used to work for an [employer] but now she is retired.   She has no family Hx of kidney disease.   Regarding hypertension, she is on Maxzide (Triamterene 37.5-hydrochlorothiazide 25) 1 tab daily.      Past medical history  Past Medical History:   Diagnosis Date     Arthritis     osteo     Cancer (H)     skin cancer     COPD (chronic obstructive pulmonary disease) (H)      Heart disease      Hypertension      PONV (postoperative nausea and vomiting)      Thyroid disease        Past surgical history  Past Surgical History:   Procedure Laterality Date     APPENDECTOMY       BACK SURGERY       CHOLECYSTECTOMY       CV CORONARY ANGIOGRAM N/A 5/28/2021    Procedure: CV CORONARY ANGIOGRAM;  Surgeon: Moses Styles MD;  Location:  HEART CARDIAC CATH LAB     ENT SURGERY      tonsils     EYE SURGERY      cataracts     GI SURGERY      hiatel hernia repair     Review of Systems:   14 systems were reviewed and all negative except as mentioned above.   Current Medications:  Current Outpatient Medications   Medication     Budeson-Glycopyrrol-Formoterol (BREZTRI AEROSPHERE) 160-9-4.8 MCG/ACT AERO     celecoxib (CELEBREX) 200 MG capsule     cyanocobalamin (VITAMIN B12) 1000 MCG/ML injection     esomeprazole (NEXIUM) 20 MG DR capsule     Humidifier MISC     hydrocortisone valerate (WEST-YVROSE) 0.2 % ointment     levothyroxine (SYNTHROID,LEVOTHROID) 100 MCG tablet     nitroGLYcerin (NITROSTAT) 0.4 MG sublingual tablet     PREVIDENT 5000 BOOSTER PLUS 1.1 % PSTE     traMADol (ULTRAM)  50 MG tablet     triamterene-hydrochlorothiazide (MAXZIDE-25) 37.5-25 MG per tablet     No current facility-administered medications for this visit.       Physical Exam:   There were no vitals taken for this visit.   There is no height or weight on file to calculate BMI.    GENERAL APPEARANCE: Alert, not in acute distress, pleasant  EYES:  Not pale conjunctiva, pupils equal  HENT: Mouth without ulcers or lesions  PULM: lungs clear to auscultation bilaterally, equal air movement, no clubbing  CV: regular rhythm, normal rate, no rub     -edema: none  GI: soft,  - tender, no distended, bowel sounds are present  INTEGUMENT: No rash  NEURO:  Non focal. No asterixis.     Labs:   All labs reviewed by me  Last Renal Panel:  Sodium   Date Value Ref Range Status   05/17/2022 132 (L) 133 - 144 mmol/L Final   05/28/2021 138 133 - 144 mmol/L Final     Potassium   Date Value Ref Range Status   05/17/2022 3.2 (L) 3.4 - 5.3 mmol/L Final   05/28/2021 4.4 3.4 - 5.3 mmol/L Final     Chloride   Date Value Ref Range Status   05/17/2022 94 94 - 109 mmol/L Final   05/28/2021 103 94 - 109 mmol/L Final     Carbon Dioxide   Date Value Ref Range Status   05/28/2021 30 20 - 32 mmol/L Final     Carbon Dioxide (CO2)   Date Value Ref Range Status   05/17/2022 29 20 - 32 mmol/L Final     Anion Gap   Date Value Ref Range Status   05/17/2022 9 3 - 14 mmol/L Final   05/28/2021 4 3 - 14 mmol/L Final     Glucose   Date Value Ref Range Status   05/17/2022 124 (H) 70 - 99 mg/dL Final   05/28/2021 113 (H) 70 - 99 mg/dL Final     Urea Nitrogen   Date Value Ref Range Status   05/17/2022 15 7 - 30 mg/dL Final   05/28/2021 19 7 - 30 mg/dL Final     Creatinine   Date Value Ref Range Status   05/17/2022 1.19 (H) 0.52 - 1.04 mg/dL Final   05/28/2021 1.14 (H) 0.52 - 1.04 mg/dL Final     GFR Estimate   Date Value Ref Range Status   05/17/2022 46 (L) >60 mL/min/1.73m2 Final     Comment:     Effective December 21, 2021 eGFRcr in adults is calculated using the 2021  CKD-EPI creatinine equation which includes age and gender (Robbie moyer al., NEJ, DOI: 10.1056/GRHUat5267985)   05/28/2021 45 (L) >60 mL/min/[1.73_m2] Final     Comment:     Non  GFR Calc  Starting 12/18/2018, serum creatinine based estimated GFR (eGFR) will be   calculated using the Chronic Kidney Disease Epidemiology Collaboration   (CKD-EPI) equation.       Calcium   Date Value Ref Range Status   05/17/2022 9.7 8.5 - 10.1 mg/dL Final   05/28/2021 9.9 8.5 - 10.1 mg/dL Final     Phosphorus   Date Value Ref Range Status   05/17/2022 2.5 2.5 - 4.5 mg/dL Final     Albumin   Date Value Ref Range Status   05/17/2022 3.9 3.4 - 5.0 g/dL Final       Imaging:  I reviewed imaging studies.     Assessment & Recommendations:   Problem list  # CKD stage 3A secondary to chronic hypertension and NSAIDs use  The patient started to have creatinine elevation at least since 2013.  But over the years creatinine has not changed much.  Currently her creatinine has been hovering between 1.1-1.4.  Her current EGFR is 42-46.  Etiology of her chronic kidney disease likely multifactorial in the setting of chronic hypertension and NSAID use.  She is an ex-smoker which can also contribute to kidney injury as well.  At this time does not seem like her chronic kidney disease has rapid progression.  Her lab is quite stable except mild hyponatremia and hypokalemia.  Her calcium is also a little bit elevated at 10.7.  Her UA is pretty clean without blood no protein.  The patient does not have any kidney imaging.  I do not think that we need to do any GN work-up at this time because my suspicion is low.  But I will order a kidney ultrasound to look for any anatomy, abnormalities.  I also discussed with her how to keep her kidney healthy which include keep her blood pressure in a good range, stay well-hydrated and stay away from nephrotoxic agents such as NSAID.  - US kidneys without Doppler  - Refer to patient for dietitian  #  Hypokalemia  # Mild intermittent hyponatremia  This is likely in the setting of diuretics induced.  Her blood pressure today is 130/75.  I decided to stop Maxzide and start her on low-dose amlodipine 2.5 mg daily.  She will let us know the blood pressure in 2 weeks.  Discussed potential side effects with her which include potential lower extremity edema.  # HTN  Blood pressure is excellent today at 130/75.  She discussed low-salt diet.  Her plan for blood pressure medication as outlined above.  #Mild hypercalcemia  #Hyper Vitamin D  PTH is 56.  Vitamin D level was 82.  She used to take high-dose vitamin D but now stopped for 2 weeks.  I asked her to continue to remain stopping vitamin D.  I have asked her to continue to drink fluids.  Repeat calcium next visit.  #Anemia surveillance  Hemoglobin is 14.3.  #History of hiatal hernia  #Chronic pain on tramadol    Follow-up in 6 months with labs    I spent  90 minutes on the date of the encounter doing chart review, history and exam, documentation and further activities as noted above. 40 minutes of this visit is dedicated to direct patient interaction via face to face.     Patricia Porras MD on 5/19/22

## 2022-05-23 ENCOUNTER — ANCILLARY PROCEDURE (OUTPATIENT)
Dept: ULTRASOUND IMAGING | Facility: CLINIC | Age: 82
End: 2022-05-23
Attending: INTERNAL MEDICINE
Payer: MEDICARE

## 2022-05-23 DIAGNOSIS — N18.31 STAGE 3A CHRONIC KIDNEY DISEASE (H): ICD-10-CM

## 2022-05-23 PROCEDURE — 76770 US EXAM ABDO BACK WALL COMP: CPT | Mod: TC | Performed by: RADIOLOGY

## 2022-05-26 ENCOUNTER — TELEPHONE (OUTPATIENT)
Dept: NEPHROLOGY | Facility: CLINIC | Age: 82
End: 2022-05-26
Payer: MEDICARE

## 2022-05-26 NOTE — TELEPHONE ENCOUNTER
"Patricia Porras MD [place], TOYIN Cruz,     Can you update the result of US to Mei?.  She has possible stone in one kidney. SO she should stay well hydrated. She has mild atrophic kidneys. Otherwise no obstruction    5/26- Left VM.    5/31- Spoke with Mei- went over the above results with her. She did mention that she had terrible diarrhea from Friday through Monday afternoon- per Mei \"it would just run out every time she stood up and was unable to make it to bathroom some of the time\".  She did have a temperature of 102 at that time as well, she doesn't have a temperature anymore. She has not been hydrating the best because it would just come out in form of diarrhea. She started taking immodium and her last loose stool was yesterday afternoon. She was unable to check BP during the weekend due to everything going on.  Will route to let Dr. Porras know of this issue.    "

## 2022-05-27 NOTE — TELEPHONE ENCOUNTER
Pt called back requesting a call back as she forgot the number that was given to her from nurse. Please call pt to discuss. Thanks

## 2022-06-01 DIAGNOSIS — J43.9 PULMONARY EMPHYSEMA, UNSPECIFIED EMPHYSEMA TYPE (H): Primary | ICD-10-CM

## 2022-06-01 NOTE — TELEPHONE ENCOUNTER
Chart reviewed, Dr. Linares did not prescribe Budeson-Glycopyrrol-Formoterol (BREZTRI AEROSPHERE) 160-9-4.8 MCG/ACT AERO. [facility] initiated Breztri for the patient. Adult pulmonary clinic has never received an Rx refill request for Breztri. Contacted patient informing her of this information. Patient states that she has 2 refills of Breztri on file that [facility] will not release. Patient is requesting that nursing contact [facility] to inquire as to why she is unable to obtain refills.     Contacted [facility] regarding Breztri Rx refills. Per [facility], Dr. Bernardo is the prescriber of patient's Breztri and there are no refills available. Contacted patient informing her of this information. Rx refill pended for Breztri pended for review.      Medication:   Budeson-Glycopyrrol-Formoterol (BREZTRI AEROSPHERE) 160-9-4.8 MCG/ACT AERO    Sig: INHALE 2 PUFFS INTO THE LUNGS TWICE DAILY  Date last written: Unknown  Dispensed amount: Unknown  Refills: Unknown        Pt's last office visit: 01/10/2022  Next scheduled office visit: Unknown      Renay Francisco LPN  Pulmonary Medicine:  [facility]  Phone: [phone] Fax: [phone]

## 2022-06-01 NOTE — TELEPHONE ENCOUNTER
The patient is requesting a new prescription for Breztri to be sent to her new preferred pharmacy.  She state she will fill the one that was sent to [facility] but will not do another refill at that pharmacy.      Pref[facility] Pharm[facility]

## 2022-06-01 NOTE — TELEPHONE ENCOUNTER
[facility] Call Center    Phone Message    May a detailed message be left on voicemail: yes     Reason for Call: Medication Refill Request    Has the patient contacted the pharmacy for the refill? Yes   Name of medication being requested: Budeson-Glycopyrrol-Formoterol (BREZTRI AEROSPHERE) 160-9-4.8 MCG/ACT AERO  Provider who prescribed the medication: Milagros  Pharmacy: [id number] [place], [place] - [address] AT [facility]  Date medication is needed: ASAP - the patient stated their pharmacy said this medication was denied by the provider. She is wondering if she needs an appointment? Her last visit, 1/10/22, states to follow up in 1 year. Please advise. Thank you.       Action Taken: Message routed to:  Adult Clinics: Pulmonology p [id number]    Travel Screening: Not Applicable

## 2022-06-04 ENCOUNTER — HEALTH MAINTENANCE LETTER (OUTPATIENT)
Age: 82
End: 2022-06-04

## 2022-06-06 ENCOUNTER — TELEPHONE (OUTPATIENT)
Dept: NEPHROLOGY | Facility: CLINIC | Age: 82
End: 2022-06-06
Payer: MEDICARE

## 2022-06-06 RX ORDER — BUDESONIDE, GLYCOPYRROLATE, AND FORMOTEROL FUMARATE 160; 9; 4.8 UG/1; UG/1; UG/1
2 AEROSOL, METERED RESPIRATORY (INHALATION) 2 TIMES DAILY
Qty: 32.1 G | Refills: 3 | Status: SHIPPED | OUTPATIENT
Start: 2022-06-06 | End: 2023-01-11

## 2022-06-06 NOTE — TELEPHONE ENCOUNTER
[facility] Call Center    Phone Message    May a detailed message be left on voicemail: yes     Reason for Call: The patient called stating she has gout, and is wondering if there is anything else she can take other than Tramadol that will not interfere with her kidney function? Please advise. Thank you.     Action Taken: Message routed to:  [facility]): Nephrology    Travel Screening: Not Applicable

## 2022-06-07 ENCOUNTER — NURSE TRIAGE (OUTPATIENT)
Dept: NURSING | Facility: CLINIC | Age: 82
End: 2022-06-07
Payer: MEDICARE

## 2022-06-07 NOTE — TELEPHONE ENCOUNTER
Pt calling a second time today (called yesterday)  with report of gout flare-up (she's had for many years)    Wondering if there is any Rx for gout she could get that won't interfere with her kidney function.  Also wondering if she can get more tramadol as she's been taking 6/day to treat the pain from the gout flare up.  Pt states she is also taking Voltaren gel but is unsure if this is helping.      Pt states she is open to other medication suggestions outside of tramadol if you feel they would work better.  She just wants to feel better.      Please call to advise and if any new Rx is sent, please sent to [facility] [address] [address].      Joslyn Eddy RN  [facility] Triage Nurse Advisor   6/7/2022 1:58 PM               Reason for Disposition    Caller requesting a NON-URGENT new prescription or refill and triager unable to refill per department policy    Additional Information    Negative: Drug overdose and triager unable to answer question    Negative: Caller requesting information unrelated to medicine    Negative: Caller requesting a prescription for Strep throat and has a positive culture result    Negative: Rash while taking a medication or within 3 days of stopping it    Negative: Immunization reaction suspected    Negative: Asthma and having symptoms of asthma (cough, wheezing, etc.)    Negative: Breastfeeding questions about mother's medicines and diet    Negative: MORE THAN A DOUBLE DOSE of a prescription or over-the-counter (OTC) drug    Negative: DOUBLE DOSE (an extra dose or lesser amount) of over-the-counter (OTC) drug and any symptoms (e.g., dizziness, nausea, pain, sleepiness)    Negative: DOUBLE DOSE (an extra dose or lesser amount) of prescription drug and any symptoms (e.g., dizziness, nausea, pain, sleepiness)    Negative: Took another person's prescription drug    Negative: DOUBLE DOSE (an extra dose or lesser amount) of prescription drug and NO symptoms (Exception: a  double dose of antibiotics)    Negative: Diabetes drug error or overdose (e.g., took wrong type of insulin or took extra dose)    Negative: Caller has medication question about med not prescribed by PCP and triager unable to answer question (e.g., compatibility with other med, storage)    Negative: Request for URGENT new prescription or refill of 'essential' medication (i.e., likelihood of harm to patient if not taken) and triager unable to fill per department policy    Negative: Pharmacy calling with prescription questions and triager unable to answer question    Negative: Prescription not at pharmacy and was prescribed today by PCP    Negative: Caller has urgent medication question about med that PCP prescribed and triager unable to answer question    Protocols used: MEDICATION QUESTION CALL-A-[place]

## 2022-06-07 NOTE — TELEPHONE ENCOUNTER
Writer called the patient back regarding message below.   Informed her of Dr Porras's recommendations regarding follow up with her Primary care doctor and concerns with gout. Encouraged the patient to abstain from NSAIDs like (naproxen, ibuprofen, aleve) per Dr Porras and to follow up with Primary for management of gout regarding message below.    She should talk to her PCP or rheumatologist but she should avoid alleve, Ibuprofen, naproxen, diclofenac. She can take short course steroid and also low dose and short course cholchicine for gout attack.     Thanks,   JULIANA Phillips LPN  Nephrology  [phone]

## 2022-06-13 NOTE — TELEPHONE ENCOUNTER
It appears as though patient was seen in Urgent Care 06/07/2022 and 06/10/2022- closing encounter.    ISAAC Shen RN  [facility], [facility]

## 2022-06-30 ENCOUNTER — OFFICE VISIT (OUTPATIENT)
Dept: FAMILY MEDICINE | Facility: CLINIC | Age: 82
End: 2022-06-30
Payer: MEDICARE

## 2022-06-30 VITALS
RESPIRATION RATE: 20 BRPM | HEART RATE: 81 BPM | DIASTOLIC BLOOD PRESSURE: 68 MMHG | TEMPERATURE: 98.4 F | WEIGHT: 145.6 LBS | OXYGEN SATURATION: 97 % | BODY MASS INDEX: 27.51 KG/M2 | SYSTOLIC BLOOD PRESSURE: 130 MMHG

## 2022-06-30 DIAGNOSIS — N18.32 STAGE 3B CHRONIC KIDNEY DISEASE (H): ICD-10-CM

## 2022-06-30 DIAGNOSIS — M10.9 GOUT, UNSPECIFIED CAUSE, UNSPECIFIED CHRONICITY, UNSPECIFIED SITE: Primary | ICD-10-CM

## 2022-06-30 DIAGNOSIS — M19.041 PRIMARY OSTEOARTHRITIS OF BOTH HANDS: ICD-10-CM

## 2022-06-30 DIAGNOSIS — I10 HYPERTENSION, ESSENTIAL: ICD-10-CM

## 2022-06-30 DIAGNOSIS — J43.9 PULMONARY EMPHYSEMA, UNSPECIFIED EMPHYSEMA TYPE (H): ICD-10-CM

## 2022-06-30 DIAGNOSIS — M35.3 PMR (POLYMYALGIA RHEUMATICA) (H): ICD-10-CM

## 2022-06-30 DIAGNOSIS — M19.042 PRIMARY OSTEOARTHRITIS OF BOTH HANDS: ICD-10-CM

## 2022-06-30 DIAGNOSIS — M81.0 OSTEOPOROSIS WITHOUT CURRENT PATHOLOGICAL FRACTURE, UNSPECIFIED OSTEOPOROSIS TYPE: ICD-10-CM

## 2022-06-30 PROCEDURE — 99214 OFFICE O/P EST MOD 30 MIN: CPT | Performed by: FAMILY MEDICINE

## 2022-06-30 RX ORDER — ALLOPURINOL 100 MG/1
TABLET ORAL
Qty: 45 TABLET | Refills: 3 | Status: SHIPPED | OUTPATIENT
Start: 2022-06-30 | End: 2022-11-21

## 2022-06-30 RX ORDER — PREDNISONE 5 MG/1
TABLET ORAL
COMMUNITY
Start: 2022-06-17 | End: 2022-11-21

## 2022-06-30 RX ORDER — AMLODIPINE BESYLATE 5 MG/1
5 TABLET ORAL DAILY
Qty: 90 TABLET | Refills: 3 | Status: SHIPPED | OUTPATIENT
Start: 2022-06-30 | End: 2023-05-11

## 2022-06-30 RX ORDER — TRAMADOL HYDROCHLORIDE 50 MG/1
50 TABLET ORAL
Qty: 90 TABLET | Refills: 0 | Status: SHIPPED | OUTPATIENT
Start: 2022-06-30 | End: 2022-09-28

## 2022-06-30 ASSESSMENT — PAIN SCALES - GENERAL: PAINLEVEL: NO PAIN (0)

## 2022-06-30 NOTE — PROGRESS NOTES
"  Assessment & Plan     Gout, unspecified cause, unspecified chronicity, unspecified site  Multiple flareup in the past few weeks.  Her last uric acid from November, 2021 was at 8.3.  She has been taking prednisone, tramadol to treat her pain.  Because of her chronic kidney disease she cannot take any NSAID.    I added a low-dose of allopurinol 50 mg 1 tablet daily to help reducing her uric acid level,  prevent her from a flareup.  Continue tramadol 1 tablet daily as needed.  - allopurinol (ZYLOPRIM) 100 MG tablet; 1/2 tab daily ( 50 mg ) daily  - traMADol (ULTRAM) 50 MG tablet; Take 1 tablet (50 mg) by mouth nightly as needed for severe pain    Pulmonary emphysema, unspecified emphysema type (H)  Stable, continue with current inhalers.  - COPD ACTION PLAN    Hypertension, essential  Stable,continue with  Amlodipine at 5 mg, daily  - amLODIPine (NORVASC) 5 MG tablet; Take 1 tablet (5 mg) by mouth daily    PMR (polymyalgia rheumatica) (H)  Uses Tramadol as needed  Given #90, for 90 days,  [facility] Opioid agreement been signed,   Follow up in 3 months.    - traMADol (ULTRAM) 50 MG tablet; Take 1 tablet (50 mg) by mouth nightly as needed for severe pain     [place] Prescription Monitoring Program, ( ) referenced. No indication of misuse, or abuse.    Osteoporosis without current pathological fracture, unspecified osteoporosis type  Increase physical activity, avoid risk of falls.    Primary osteoarthritis of both hands  Use ultram as  needed    Stage 3b chronic kidney disease (H)  Stable, continue to follow up with Nephrology.  Discussed to avoid any NSAID, increase water intake, continuing her current blood pressure medication.    BMI:   Estimated body mass index is 27.51 kg/m  as calculated from the following:    Height as of 1/10/22: 1.549 m (5' 1\").    Weight as of this encounter: 66 kg (145 lb 9.6 oz).   Weight management plan: Discussed healthy diet and exercise guidelines    Work on weight loss  Regular " exercise    Return in about 3 months (around 9/30/2022) for Follow up, in person.    Rina Cardenas MD  [facility]    Grace Hurley is a 81 year old presenting for the following health issues:  History of gout, she is having 3 flareup in the past few weeks.  She does take tramadol, she had finished prednisone 5 mg.  History of osteoarthritis, history of pulmonary rheumatica.  History of chronic kidney disease stage III.  History of hypertension, she does follow-up with nephrology.  She has no fever, no chills.  No chest pain or short of breath.  History of COPD emphysema.    History of Present Illness       Reason for visit:  Gout, drugs  Symptom onset:  1-2 weeks ago  Symptom intensity:  Moderate  Symptom progression:  Improving  Had these symptoms before:  Yes  What makes it better:  Prednisone    She eats 4 or more servings of fruits and vegetables daily.She consumes 0 sweetened beverage(s) daily.She exercises with enough effort to increase her heart rate 10 to 19 minutes per day.  She exercises with enough effort to increase her heart rate 3 or less days per week.   She is taking medications regularly.       Review of Systems   Constitutional, HEENT, cardiovascular, pulmonary, GI, , musculoskeletal, neuro, skin, endocrine and psych systems are negative, except as otherwise noted.      Objective    There were no vitals taken for this visit.  There is no height or weight on file to calculate BMI.  Physical Exam   GENERAL: healthy, alert and no distress  HENT: ear canals and TM's normal, nose and mouth without ulcers or lesions  NECK: no adenopathy, no asymmetry, masses, or scars and thyroid normal to palpation  RESP: lungs clear to auscultation - no rales, rhonchi or wheezes  CV: regular rate and rhythm, normal S1 S2, no S3 or S4, no murmur, click or rub, no peripheral edema and peripheral pulses strong  ABDOMEN: soft, nontender, no hepatosplenomegaly, no masses and bowel sounds  normal  MS:  redness, swelling, left great toe.    PSYCH: mentation appears normal, affect normal/bright    Rina Cardenas MD.          .  ..

## 2022-07-01 NOTE — PROGRESS NOTES
"[facility]  Outpatient MNT     Time Spent: 45 minutes  Visit Type: Initial  Referring Physician: Fabiola   Reason for RD Visit: CKD   Pt accompanied by:      Nutrition Assessment  CKD III. Pt reports being cognizant of dietary sodium intake and label reading with having success limiting Na to 1500 mg/day for Meniere's.   H/o celiac dz (unknown duration) and vegetarian- 1 year \"this time\", longest stretch 10 years   Gout - no known triggers in her diet  HTN x 15-20 years, on medication. BP appear 130s/70s    Vitamins, Supplements, Pertinent Meds: vit B12 injection; reports she was taking ?5000 units of vit D but stopped d/t recent elevated level in the 80s (upper range is 75)  Herbal Medicines/Supplements: not asked     Weight hx: not asked     Labs: 5/18 K 3.1 Phos 2.7 eGFR 42     Diet Recall  Breakfast GF toast x 2 w/ PB or jelly or less often w/ CC   Lunch Cheese and mustard s/w; 3 oreos; tuna with andrews, pickles, celery on bread     Dinner Gf spaghetti w/ letty sauce + beyond burger + salsa + occasional side (green beans- canned or frozen); grilled meat substitute; Lyn[employer] frozen meal (veg)- 2-3x/week   Snacks Ice cream, popsicle, fudge bar, gluten free baked goods   Beverages Black coffee, water, occasional tea (green, chamomile, sleepy time), irregular soda    Alcohol Infrequent wine or scotch    Dining out Not asked      Physical Activity  Not asked      Nutrition Diagnosis  No nutrition diagnosis identified at this time    Nutrition Intervention  Reviewed diet for CKD, most notably, continuing a lower Na diet. Reviewed K/Phos and how these do not need to be restricted in diet unless labs were to become elevated, which most often occurs stage 4/5.     Reviewed purine list of foods and to consider keeping a food and symptom journal to associate any foods w/ gout. Looked up Beyond Burger (made mostly from pea protein- moderate in purines) and Impossible meat subs (made mostly from soy- low purine). "     If she continues on longer term prednisone, discussed recommendation to take calcium + vit D. She reports she already has osteoporosis so unsure how much benefit calcium would have at this point. She could always restart a lower dose vit D this fall/winter.     Patient Understanding: Pt verbalized understanding of education provided.  Expected Engagement: Good  Follow-Up Plans: PRN     Nutrition Goals  No nutrition goals identified at this time     Lauren Mcbride, RD, LD, CCTD

## 2022-07-05 ENCOUNTER — ALLIED HEALTH/NURSE VISIT (OUTPATIENT)
Dept: TRANSPLANT | Facility: CLINIC | Age: 82
End: 2022-07-05
Attending: INTERNAL MEDICINE
Payer: MEDICARE

## 2022-07-05 DIAGNOSIS — M10.9 GOUT: Primary | ICD-10-CM

## 2022-07-05 DIAGNOSIS — N18.32 STAGE 3B CHRONIC KIDNEY DISEASE (H): ICD-10-CM

## 2022-07-05 PROCEDURE — 97802 MEDICAL NUTRITION INDIV IN: CPT | Performed by: DIETITIAN, REGISTERED

## 2022-07-05 NOTE — PATIENT INSTRUCTIONS
Low Purine    This diet will help reduce the amount of uric acid in your blood  You will need to limit foods with purine (a kind or uric acid)  You should drink little to no alcohol    Foods Recommended  This chart shows foods that are low to moderate in purines. You can eat any amount of the foods low in purines. For the foods that are moderate in purines, stick to the amounts shown in the chart.     Food Group Foods Low in Purines Foods Moderate in Purines   Beverages Water, carbonated beverages, tea, coffee, cocoa    Breads and cereals Bread, pasta, rice, cake cornbread, popcorn Oatmeal (do not eat more than 2/3 cup uncooked or dry, daily)  Wheat bran, wheat germ (do not eat more than 1/4 cup dry, daily)   Condiments  Salt, herbs, olives, pickles, relishes, vinegar    Dairy  All dairy foods     Fats and oils  All types, except gravies and sauces made with meat     Fruits  All     Proteins  Eggs, nuts, peanut butter  Meat and poultry  Crab, lobster, oyster, and shrimp (limit to 1-2 servings/day; 1 serving = 2-3 ounces)  Dried beans, peas, and lentils (limit to 1 cup cooked daily)   Soups  Soups made without meat  Meat or fish-based soups, broths, or bouillons    Vegetables  All veggies, except those moderate in purines (next column) Asparagus, cauliflower, spinach, mushrooms, green peas (do not eat more than 1/2 cup of these veggies daily)   Other foods  Sweets, sugar, gelatin       Foods Not Recommended  No foods must be completely avoided. However, you should limit foods high in purines.     Food Group Foods High in Purines   Beverages Beer and other alcoholic beverages   Fats and oils  Gravies and sauces made with meat    Proteins  Anchovies, sardines, herring, mussels, tuna, codfish, scallops, trout, and soco; brito; organ meats (liver or kidney); tripe; sweetbreads; wild game; goose   Other  Yeast and yeast extracts (in some supplements)

## 2022-08-15 NOTE — PROGRESS NOTES
Referring provider: Self-referred    Chief complaint: Chest discomfort    HPI: Ms. Yvonne Maria is a 80 year old  female with PMH significant for    -COPD (panlobular emphysema) on oxygen at night and inhalers  -Pulmonary nodules  -Hypertension, well controlled  -Probable polymyalgia rheumatica  -Ménière's disease  -Celiac disease  -Former smoker    Patient is being seen today for chest discomfort over the last 6 to 8 months.  Chest discomfort is around the epigastric and lower midsternal chest.  The discomfort radiates to her back.  Symptom occurs both at rest and with exertion mainly with stairs.  She tells me that she feels the discomfort while sitting in clinic right now.  It is mild.  Does not happen every day.  Sometimes associated with sweating but denies nausea.  Last for 20 to 30 minutes.  She tells me that she herself has decided to see cardiology, GI and pulmonology for this particular symptom.  She is scheduled for upper endoscopy on 5/18.    Patient was evaluated in cardiology at [facility] in 2017 for similar symptoms.  She had CT coronary angiogram in 2017 which showed elevated coronary artery calcium score at 333 with no obstructive CAD.  She was started on statin (she recalls the dose was 40 mg but cannot recall the name) which she could not tolerate and stopped.  Patient expressed her unpleasant memory about this because she tells me that the statin was never discussed with her before she was started on this treatment.    Patient has history of Ménière's and she tells me that she was started on triamterene hydrochlorothiazide initially 2 tablets a day.  She has made significant lifestyle changes.  She decreased salt intake and she was able to lower the dose of the medication to once daily now.    She has history of hiatal hernia surgery in 2019. She has COPD currently on inhalers and oxygen at night. Patient has seen rheumatology in the past for suspicious temporal arteritis.  Temporal  artery biopsy was unremarkable.  She was on steroids for polymyalgia rheumatica. She tells me that she has celiac disease currently well controlled with diet.    No history of diabetes.  She has hyperlipidemia currently not on treatment.      Patient has 27-pack-year history of smoking.  Quit date 1986.    Current medications are triamterene hydrochlorothiazide 1 tablet a day, aspirin 81 mg, potassium supplement, prednisone 5 mg once daily.    Medications, personal, family, and social history reviewed with patient and revised.    Interval history (6/2/2021):  Patient had coronary angiogram on 5/28/2021 which showed non-obstructive coronary artery disease (pLAD: 50%; mRCA: 60%). Patient was started on Imdur 30 mg daily. Rosuvastatin was increased to 40 mg daily. Patient states after  taking  Rosuvastatin and imdur, she could not get out of bed the next day. She had achiness all over her body. She felt weak and fatigued. Patient stopped taking 40 mg of Rosuvastatin and started taking the 10 mg daily, starting today. She denies chest pain. SOB with exertion is stable, and she attributes this to her emphysema. No lightheadedness or dizziness. No syncope or presyncope.      Interval history 8/16/2022:  Patient is being seen today for annual follow-up.  Over the last 1 year she tells me that she stopped taking cholesterol medication thinking that it might be cause of her chronic pain.  She tells me she has chronic pain all over the body.  She is taking tramadol almost every day.  Denies exertional chest pain.  Mild shortness of breath with activity.  She has started walking on the treadmill for 20 minutes.  She reports feeling lightheaded and a little bit dizzy when she is standing or standing over the last 1 year.  No syncope.  Does not happen every day.  She tells me she has not been on aspirin all along.  She has seen nephrology for CKD and she was recommended to start amlodipine 5 mg for HTN.  She does not monitor  blood pressure at home.    PAST MEDICAL HISTORY:  Past Medical History:   Diagnosis Date     Arthritis     osteo     Cancer (H)     skin cancer     COPD (chronic obstructive pulmonary disease) (H)      Heart disease      Hypertension      PONV (postoperative nausea and vomiting)      Thyroid disease        CURRENT MEDICATIONS:  Current Outpatient Medications   Medication Sig Dispense Refill     allopurinol (ZYLOPRIM) 100 MG tablet 1/2 tab daily ( 50 mg ) daily 45 tablet 3     amLODIPine (NORVASC) 5 MG tablet Take 1 tablet (5 mg) by mouth daily 90 tablet 3     Budeson-Glycopyrrol-Formoterol (BREZTRI AEROSPHERE) 160-9-4.8 MCG/ACT AERO Inhale 2 puffs into the lungs 2 times daily (Patient not taking: Reported on 6/30/2022) 32.1 g 3     Budeson-Glycopyrrol-Formoterol (BREZTRI AEROSPHERE) 160-9-4.8 MCG/ACT AERO INHALE 2 PUFFS INTO THE LUNGS TWICE DAILY       cyanocobalamin (VITAMIN B12) 1000 MCG/ML injection Inject 1 mL into the muscle every 30 days       Humidifier MISC 1 Device daily 1 each 0     hydrocortisone valerate (WEST-YVROSE) 0.2 % ointment Apply topically as needed       levothyroxine (SYNTHROID,LEVOTHROID) 100 MCG tablet Take 1 tablet (100 mcg) by mouth daily 90 tablet 3     nitroGLYcerin (NITROSTAT) 0.4 MG sublingual tablet For chest pain place 1 tablet under the tongue every 5 minutes for 3 doses. If symptoms persist 5 minutes after 1st dose call 911. (Patient not taking: Reported on 6/30/2022) 25 tablet 0     predniSONE (DELTASONE) 5 MG tablet TAKE 1 TABLET BY MOUTH EVERY DAY AS NEEDED       PREVIDENT 5000 BOOSTER PLUS 1.1 % PSTE USE IN PLACE OF REGULAR TOOTH PASTE. BRUSH THROUGHLY FOR 2 MINUTES AT BEDTIME. NOTHING BY MOUTH FOR 30 MINUTES.       traMADol (ULTRAM) 50 MG tablet Take 1 tablet (50 mg) by mouth nightly as needed for severe pain 90 tablet 0     traMADol (ULTRAM) 50 MG tablet TAKE 2 TABLETS BY MOUTH EVERY 8 TO 12 HOURS AS NEEDED FOR PAIN         PAST SURGICAL HISTORY:  Past Surgical History:    Procedure Laterality Date     APPENDECTOMY       BACK SURGERY       CHOLECYSTECTOMY       CV CORONARY ANGIOGRAM N/A 2021    Procedure: CV CORONARY ANGIOGRAM;  Surgeon: Moses Styles MD;  Location:  [facility]     ENT SURGERY      tonsils     EYE SURGERY      cataracts     GI SURGERY      hiatel hernia repair       ALLERGIES:     Allergies   Allergen Reactions     Gluten Meal Diarrhea     Celiac disease  Celiac disease       Pregabalin Swelling     Other reaction(s): Edema  Swelling legs and hands       Tetracyclines      Other reaction(s): *Unknown, Dermatitis  Other reaction(s): rash  PN: LW Reaction: rash  questionable allergy  questionable allergy       Allopurinol Itching       FAMILY HISTORY:  Family History   Problem Relation Age of Onset     Myocardial Infarction Father      Heart Disease Maternal Grandmother      Lymphoma Maternal Grandfather      Heart Disease Paternal Grandmother      Myocardial Infarction Son          SOCIAL HISTORY:  Social History     Tobacco Use     Smoking status: Former Smoker     Packs/day: 1.00     Years: 27.00     Pack years: 27.00     Types: Cigarettes     Quit date: 3/28/1986     Years since quittin.4     Smokeless tobacco: Never Used   Vaping Use     Vaping Use: Never used   Substance Use Topics     Alcohol use: Yes     Alcohol/week: 0.0 standard drinks     Comment: Wine 5-7 glasses per week     Drug use: No       ROS:   Constitutional: No fever, chills, or sweats. Weight stable.   Cardiovascular: As per HPI.     Exam:  BP (!) 157/80 (BP Location: Right arm, Patient Position: Chair, Cuff Size: Adult Regular)   Pulse 83   Wt 65.8 kg (145 lb)   SpO2 99%   BMI 27.40 kg/m      GENERAL APPEARANCE: alert and no distress  HEENT: no icterus, no central cyanosis  CARDIOVASCULAR: regular rhythm, normal S1, S2, no S3 or S4 and no murmur, click or rub, precordium quiet with normal PMI.  EXTREMITIES: no edema  NEURO: alert, normal speech,and  affect  SKIN: no ecchymoses, no rashes     I have reviewed the labs and personally reviewed the imaging below and made my comment in the assessment and plan.    Labs:  CBC RESULTS:   Lab Results   Component Value Date    WBC 9.0 05/18/2022    WBC 8.7 05/28/2021    RBC 4.93 05/18/2022    RBC 4.89 05/28/2021    HGB 14.3 05/18/2022    HGB 14.1 05/28/2021    HCT 43.2 05/18/2022    HCT 42.6 05/28/2021    MCV 88 05/18/2022    MCV 87 05/28/2021    MCH 29.0 05/18/2022    MCH 28.8 05/28/2021    MCHC 33.1 05/18/2022    MCHC 33.1 05/28/2021    RDW 14.2 05/18/2022    RDW 13.8 05/28/2021     05/18/2022     05/28/2021       BMP RESULTS:  Lab Results   Component Value Date     05/18/2022     05/28/2021    POTASSIUM 3.1 (L) 05/18/2022    POTASSIUM 4.4 05/28/2021    CHLORIDE 96 05/18/2022    CHLORIDE 103 05/28/2021    CO2 28 05/18/2022    CO2 30 05/28/2021    ANIONGAP 9 05/18/2022    ANIONGAP 4 05/28/2021     (H) 05/18/2022     (H) 05/28/2021    BUN 13 05/18/2022    BUN 19 05/28/2021    CR 1.29 (H) 05/18/2022    CR 1.14 (H) 05/28/2021    GFRESTIMATED 42 (L) 05/18/2022    GFRESTIMATED 45 (L) 05/28/2021    GFRESTBLACK 53 (L) 05/28/2021    ARTURO 10.7 (H) 05/18/2022    ARTURO 9.9 05/28/2021     Recent Labs   Lab Test 05/06/21  0930   CHOL 225*   HDL 77   *   TRIG 134         CT coronary angiogram 8/9/2017 [facility]    PLAQUE TYPE:  Hard: Calcium Score = 333, 79th percentile for matched age   and sex.     SCAN QUALITY: Good.    FINDINGS:    CORONARY ANATOMY:  (Right Dominant)    LEFT MAIN:  There is moderate distal left main calcification.  There is no   stenosis.    LEFT ANTERIOR DESCENDING:  Diffuse calcification with no stenosis.    FIRST DIAGONAL:  No stenoses.     SECOND DIAGONAL:  No stenoses.       CIRCUMFLEX:  Nondominant.  No stenosis.    FIRST OBTUSE MARGINAL:  No stenoses.     SECOND OBTUSE MARGINAL:  No stenoses.       RIGHT CORONARY ARTERY:  Dominant.  Diffuse calcification.   Careful review   of the right coronary artery shows a mild to moderate mid stenosis before   the right ventricular branch.      AORTA:  Proximal ascending aorta, mid-through distal descending thoracic   aorta is normal.    PERICARDIUM:  Normal thickness and without an effusion.    Exercise Stress echocardiogram [facility] 5/16/2013   1. LV function is normal. The visually estimated ejection fraction is 60%.        2. No obvious wall motion abnormalities, however endocardial definition is       limited.                                                                           3. Normal right ventricular size and systolic function.                           4. No hemodynamically significant valvular disease.                               5. No evidence of pulmonary hypertension. The estimated pulmonary artery         systolic pressure is normal at 20.6 mmHg plus right atrial pressure.               6. No pericardial effusion.     EKG personally reviewed in clinic sinus rhythm otherwise normal.    Echocardiogram 4/30/2021:  Global and regional left ventricular function is normal with an EF of 60-65%.  Global right ventricular function is normal.  No significant valvular abnormalities were noted.  The inferior vena cava was normal in size with preserved respiratory  variability.  Previous study not available for comparison.    CT coronary angiogram 5/12/2021  IMPRESSION:  1.  Severe mid RCA stenosis, and moderate stenosis in the proximal to  mid LAD. Results conveyed to referring provider.  2.  Total Agatston score 491 placing the patient in the 81 percentile  when compared to age and gender matched control group.    Coronary angiogram 5/28/2021 [facility]:  Left Main   The vessel was visualized by selective angiography and is moderate in size. There was mildly calcified vessel disease.   Left Anterior Descending   The vessel is moderate in size.   Prox LAD to Mid LAD lesion is 50% stenosed.   First Diagonal  Branch   The vessel is small.   First Septal Branch   The vessel is small and is angiographically normal.   Ramus Intermedius   The vessel is moderate in size.   Lateral Ramus Intermedius   The vessel is small and is angiographically normal.   Left Circumflex   The vessel is moderate in size and is angiographically normal.   First Obtuse Marginal Branch   The vessel is moderate in size and is angiographically normal.   Second Obtuse Marginal Branch   The vessel is moderate in size and is angiographically normal.   Lateral Second Obtuse Marginal Branch   The vessel is small and is angiographically normal.   Third Obtuse Marginal Branch   The vessel is small and is angiographically normal.   Lateral Third Obtuse Marginal Branch   The vessel is small and is angiographically normal.   Right Coronary Artery   The vessel was visualized by selective angiography and is small. There was mildly calcified vessel disease.   Prox RCA lesion is 50% stenosed.   Mid RCA lesion is 60% stenosed.   Acute Marginal Branch   The vessel is small and is angiographically normal.   Right Ventricular Branch   The vessel is small and is angiographically normal.   Right Posterior Descending Artery   The vessel is small and is angiographically normal.     Assessment and Plan:     # Nonobstructive coronary artery disease  -No exertional chest pain.  Physically active.  -Coronary angiogram 5/2021 showed moderate non-obstructive coronary artery disease.   -She reports mild lightheadedness (not orthostatic) over the last 1 year.  She has stopped a lot of her medications.  She started blood pressure medication amlodipine 5 mg over the last 2 months.  No worsening dizziness.  Recommend to continue amlodipine 5 mg  -Recommend to start aspirin 81 mg.  -Start Crestor 10 mg daily (patient tells me that she has stopped statin thinking that it might be cause of body aches.  Body aches did not change since he is off of statin.  Willing to restart  statin)    # Hypertension  -Continue amlodipine 5 mg daily  -Blood pressure high in clinic today.  Recommend to monitor at home and bring the log to her next nephrology appointment.    #Nonpositional dizziness and lightheadedness  -Unclear why she is feeling like that.  Recommend to monitor blood pressure at home when she is feeling lightheaded.    Medication changes:  -Start aspirin 81 mg  -Start Crestor 10 mg  -Continue amlodipine 5 mg    RTC as needed.    I will see patient as needed.      Total time spent 60 minutes including precharting, face-to-face clinic visit, review of labs/imaging and medical documentation     Rachana QUINN MD  [facility] Division of Cardiology  Pager [phone]

## 2022-08-16 ENCOUNTER — OFFICE VISIT (OUTPATIENT)
Dept: CARDIOLOGY | Facility: CLINIC | Age: 82
End: 2022-08-16
Payer: MEDICARE

## 2022-08-16 VITALS
HEART RATE: 80 BPM | BODY MASS INDEX: 27.4 KG/M2 | SYSTOLIC BLOOD PRESSURE: 154 MMHG | OXYGEN SATURATION: 99 % | WEIGHT: 145 LBS | DIASTOLIC BLOOD PRESSURE: 72 MMHG

## 2022-08-16 DIAGNOSIS — I10 BENIGN ESSENTIAL HYPERTENSION: ICD-10-CM

## 2022-08-16 DIAGNOSIS — I25.10 CORONARY ARTERY DISEASE INVOLVING NATIVE CORONARY ARTERY OF NATIVE HEART WITHOUT ANGINA PECTORIS: Primary | ICD-10-CM

## 2022-08-16 PROCEDURE — 99215 OFFICE O/P EST HI 40 MIN: CPT | Performed by: INTERNAL MEDICINE

## 2022-08-16 RX ORDER — ROSUVASTATIN CALCIUM 10 MG/1
10 TABLET, COATED ORAL DAILY
Qty: 90 TABLET | Refills: 3 | Status: SHIPPED | OUTPATIENT
Start: 2022-08-16 | End: 2022-11-21

## 2022-08-16 NOTE — LETTER
8/16/2022      RE: Yvonne Maria  [address] [address]       Dear Colleague,    Thank you for the opportunity to participate in the care of your patient, Yvonne Maria, at the [facility] at [facility]. Please see a copy of my visit note below.    Referring provider: Self-referred    Chief complaint: Chest discomfort    HPI: Ms. Yvonne Maria is a 80 year old  female with PMH significant for    -COPD (panlobular emphysema) on oxygen at night and inhalers  -Pulmonary nodules  -Hypertension, well controlled  -Probable polymyalgia rheumatica  -Ménière's disease  -Celiac disease  -Former smoker    Patient is being seen today for chest discomfort over the last 6 to 8 months.  Chest discomfort is around the epigastric and lower midsternal chest.  The discomfort radiates to her back.  Symptom occurs both at rest and with exertion mainly with stairs.  She tells me that she feels the discomfort while sitting in clinic right now.  It is mild.  Does not happen every day.  Sometimes associated with sweating but denies nausea.  Last for 20 to 30 minutes.  She tells me that she herself has decided to see cardiology, GI and pulmonology for this particular symptom.  She is scheduled for upper endoscopy on 5/18.    Patient was evaluated in cardiology at [facility] in 2017 for similar symptoms.  She had CT coronary angiogram in 2017 which showed elevated coronary artery calcium score at 333 with no obstructive CAD.  She was started on statin (she recalls the dose was 40 mg but cannot recall the name) which she could not tolerate and stopped.  Patient expressed her unpleasant memory about this because she tells me that the statin was never discussed with her before she was started on this treatment.    Patient has history of Ménière's and she tells me that she was started on triamterene hydrochlorothiazide initially 2 tablets a day.   She has made significant lifestyle changes.  She decreased salt intake and she was able to lower the dose of the medication to once daily now.    She has history of hiatal hernia surgery in 2019. She has COPD currently on inhalers and oxygen at night. Patient has seen rheumatology in the past for suspicious temporal arteritis.  Temporal artery biopsy was unremarkable.  She was on steroids for polymyalgia rheumatica. She tells me that she has celiac disease currently well controlled with diet.    No history of diabetes.  She has hyperlipidemia currently not on treatment.      Patient has 27-pack-year history of smoking.  Quit date 1986.    Current medications are triamterene hydrochlorothiazide 1 tablet a day, aspirin 81 mg, potassium supplement, prednisone 5 mg once daily.    Medications, personal, family, and social history reviewed with patient and revised.    Interval history (6/2/2021):  Patient had coronary angiogram on 5/28/2021 which showed non-obstructive coronary artery disease (pLAD: 50%; mRCA: 60%). Patient was started on Imdur 30 mg daily. Rosuvastatin was increased to 40 mg daily. Patient states after  taking  Rosuvastatin and imdur, she could not get out of bed the next day. She had achiness all over her body. She felt weak and fatigued. Patient stopped taking 40 mg of Rosuvastatin and started taking the 10 mg daily, starting today. She denies chest pain. SOB with exertion is stable, and she attributes this to her emphysema. No lightheadedness or dizziness. No syncope or presyncope.      Interval history 8/16/2022:  Patient is being seen today for annual follow-up.  Over the last 1 year she tells me that she stopped taking cholesterol medication thinking that it might be cause of her chronic pain.  She tells me she has chronic pain all over the body.  She is taking tramadol almost every day.  Denies exertional chest pain.  Mild shortness of breath with activity.  She has started walking on the  treadmill for 20 minutes.  She reports feeling lightheaded and a little bit dizzy when she is standing or standing over the last 1 year.  No syncope.  Does not happen every day.  She tells me she has not been on aspirin all along.  She has seen nephrology for CKD and she was recommended to start amlodipine 5 mg for HTN.  She does not monitor blood pressure at home.    PAST MEDICAL HISTORY:  Past Medical History:   Diagnosis Date     Arthritis     osteo     Cancer (H)     skin cancer     COPD (chronic obstructive pulmonary disease) (H)      Heart disease      Hypertension      PONV (postoperative nausea and vomiting)      Thyroid disease        CURRENT MEDICATIONS:  Current Outpatient Medications   Medication Sig Dispense Refill     allopurinol (ZYLOPRIM) 100 MG tablet 1/2 tab daily ( 50 mg ) daily 45 tablet 3     amLODIPine (NORVASC) 5 MG tablet Take 1 tablet (5 mg) by mouth daily 90 tablet 3     Budeson-Glycopyrrol-Formoterol (BREZTRI AEROSPHERE) 160-9-4.8 MCG/ACT AERO Inhale 2 puffs into the lungs 2 times daily (Patient not taking: Reported on 6/30/2022) 32.1 g 3     Budeson-Glycopyrrol-Formoterol (BREZTRI AEROSPHERE) 160-9-4.8 MCG/ACT AERO INHALE 2 PUFFS INTO THE LUNGS TWICE DAILY       cyanocobalamin (VITAMIN B12) 1000 MCG/ML injection Inject 1 mL into the muscle every 30 days       Humidifier MISC 1 Device daily 1 each 0     hydrocortisone valerate (WEST-YVROSE) 0.2 % ointment Apply topically as needed       levothyroxine (SYNTHROID,LEVOTHROID) 100 MCG tablet Take 1 tablet (100 mcg) by mouth daily 90 tablet 3     nitroGLYcerin (NITROSTAT) 0.4 MG sublingual tablet For chest pain place 1 tablet under the tongue every 5 minutes for 3 doses. If symptoms persist 5 minutes after 1st dose call 911. (Patient not taking: Reported on 6/30/2022) 25 tablet 0     predniSONE (DELTASONE) 5 MG tablet TAKE 1 TABLET BY MOUTH EVERY DAY AS NEEDED       PREVIDENT 5000 BOOSTER PLUS 1.1 % PSTE USE IN PLACE OF REGULAR TOOTH PASTE. BRUSH  THROUGHLY FOR 2 MINUTES AT BEDTIME. NOTHING BY MOUTH FOR 30 MINUTES.       traMADol (ULTRAM) 50 MG tablet Take 1 tablet (50 mg) by mouth nightly as needed for severe pain 90 tablet 0     traMADol (ULTRAM) 50 MG tablet TAKE 2 TABLETS BY MOUTH EVERY 8 TO 12 HOURS AS NEEDED FOR PAIN         PAST SURGICAL HISTORY:  Past Surgical History:   Procedure Laterality Date     APPENDECTOMY       BACK SURGERY       CHOLECYSTECTOMY       CV CORONARY ANGIOGRAM N/A 2021    Procedure: CV CORONARY ANGIOGRAM;  Surgeon: Moses Styles MD;  Location: [facility] CARDIAC CATH LAB     ENT SURGERY      tonsils     EYE SURGERY      cataracts     GI SURGERY      hiatel hernia repair       ALLERGIES:     Allergies   Allergen Reactions     Gluten Meal Diarrhea     Celiac disease  Celiac disease       Pregabalin Swelling     Other reaction(s): Edema  Swelling legs and hands       Tetracyclines      Other reaction(s): *Unknown, Dermatitis  Other reaction(s): rash  PN: LW Reaction: rash  questionable allergy  questionable allergy       Allopurinol Itching       FAMILY HISTORY:  Family History   Problem Relation Age of Onset     Myocardial Infarction Father      Heart Disease Maternal Grandmother      Lymphoma Maternal Grandfather      Heart Disease Paternal Grandmother      Myocardial Infarction Son          SOCIAL HISTORY:  Social History     Tobacco Use     Smoking status: Former Smoker     Packs/day: 1.00     Years: 27.00     Pack years: 27.00     Types: Cigarettes     Quit date: 3/28/1986     Years since quittin.4     Smokeless tobacco: Never Used   Vaping Use     Vaping Use: Never used   Substance Use Topics     Alcohol use: Yes     Alcohol/week: 0.0 standard drinks     Comment: Wine 5-7 glasses per week     Drug use: No       ROS:   Constitutional: No fever, chills, or sweats. Weight stable.   Cardiovascular: As per HPI.     Exam:  BP (!) 157/80 (BP Location: Right arm, Patient Position: Chair, Cuff Size: Adult Regular)    Pulse 83   Wt 65.8 kg (145 lb)   SpO2 99%   BMI 27.40 kg/m      GENERAL APPEARANCE: alert and no distress  HEENT: no icterus, no central cyanosis  CARDIOVASCULAR: regular rhythm, normal S1, S2, no S3 or S4 and no murmur, click or rub, precordium quiet with normal PMI.  EXTREMITIES: no edema  NEURO: alert, normal speech,and affect  SKIN: no ecchymoses, no rashes     I have reviewed the labs and personally reviewed the imaging below and made my comment in the assessment and plan.    Labs:  CBC RESULTS:   Lab Results   Component Value Date    WBC 9.0 05/18/2022    WBC 8.7 05/28/2021    RBC 4.93 05/18/2022    RBC 4.89 05/28/2021    HGB 14.3 05/18/2022    HGB 14.1 05/28/2021    HCT 43.2 05/18/2022    HCT 42.6 05/28/2021    MCV 88 05/18/2022    MCV 87 05/28/2021    MCH 29.0 05/18/2022    MCH 28.8 05/28/2021    MCHC 33.1 05/18/2022    MCHC 33.1 05/28/2021    RDW 14.2 05/18/2022    RDW 13.8 05/28/2021     05/18/2022     05/28/2021       BMP RESULTS:  Lab Results   Component Value Date     05/18/2022     05/28/2021    POTASSIUM 3.1 (L) 05/18/2022    POTASSIUM 4.4 05/28/2021    CHLORIDE 96 05/18/2022    CHLORIDE 103 05/28/2021    CO2 28 05/18/2022    CO2 30 05/28/2021    ANIONGAP 9 05/18/2022    ANIONGAP 4 05/28/2021     (H) 05/18/2022     (H) 05/28/2021    BUN 13 05/18/2022    BUN 19 05/28/2021    CR 1.29 (H) 05/18/2022    CR 1.14 (H) 05/28/2021    GFRESTIMATED 42 (L) 05/18/2022    GFRESTIMATED 45 (L) 05/28/2021    GFRESTBLACK 53 (L) 05/28/2021    ARTURO 10.7 (H) 05/18/2022    ARTURO 9.9 05/28/2021     Recent Labs   Lab Test 05/06/21  0930   CHOL 225*   HDL 77   *   TRIG 134         CT coronary angiogram 8/9/2017 [facility]    PLAQUE TYPE:  Hard: Calcium Score = 333, 79th percentile for matched age   and sex.     SCAN QUALITY: Good.    FINDINGS:    CORONARY ANATOMY:  (Right Dominant)    LEFT MAIN:  There is moderate distal left main calcification.  There is no   stenosis.    LEFT  ANTERIOR DESCENDING:  Diffuse calcification with no stenosis.    FIRST DIAGONAL:  No stenoses.     SECOND DIAGONAL:  No stenoses.       CIRCUMFLEX:  Nondominant.  No stenosis.    FIRST OBTUSE MARGINAL:  No stenoses.     SECOND OBTUSE MARGINAL:  No stenoses.       RIGHT CORONARY ARTERY:  Dominant.  Diffuse calcification.  Careful review   of the right coronary artery shows a mild to moderate mid stenosis before   the right ventricular branch.      AORTA:  Proximal ascending aorta, mid-through distal descending thoracic   aorta is normal.    PERICARDIUM:  Normal thickness and without an effusion.    Exercise Stress echocardiogram [facility] 5/16/2013   1. LV function is normal. The visually estimated ejection fraction is 60%.        2. No obvious wall motion abnormalities, however endocardial definition is       limited.                                                                           3. Normal right ventricular size and systolic function.                           4. No hemodynamically significant valvular disease.                               5. No evidence of pulmonary hypertension. The estimated pulmonary artery         systolic pressure is normal at 20.6 mmHg plus right atrial pressure.               6. No pericardial effusion.     EKG personally reviewed in clinic sinus rhythm otherwise normal.    Echocardiogram 4/30/2021:  Global and regional left ventricular function is normal with an EF of 60-65%.  Global right ventricular function is normal.  No significant valvular abnormalities were noted.  The inferior vena cava was normal in size with preserved respiratory  variability.  Previous study not available for comparison.    CT coronary angiogram 5/12/2021  IMPRESSION:  1.  Severe mid RCA stenosis, and moderate stenosis in the proximal to  mid LAD. Results conveyed to referring provider.  2.  Total Agatston score 491 placing the patient in the 81 percentile  when compared to age and  gender matched control group.    Coronary angiogram 5/28/2021 [facility]:  Left Main   The vessel was visualized by selective angiography and is moderate in size. There was mildly calcified vessel disease.   Left Anterior Descending   The vessel is moderate in size.   Prox LAD to Mid LAD lesion is 50% stenosed.   First Diagonal Branch   The vessel is small.   First Septal Branch   The vessel is small and is angiographically normal.   Ramus Intermedius   The vessel is moderate in size.   Lateral Ramus Intermedius   The vessel is small and is angiographically normal.   Left Circumflex   The vessel is moderate in size and is angiographically normal.   First Obtuse Marginal Branch   The vessel is moderate in size and is angiographically normal.   Second Obtuse Marginal Branch   The vessel is moderate in size and is angiographically normal.   Lateral Second Obtuse Marginal Branch   The vessel is small and is angiographically normal.   Third Obtuse Marginal Branch   The vessel is small and is angiographically normal.   Lateral Third Obtuse Marginal Branch   The vessel is small and is angiographically normal.   Right Coronary Artery   The vessel was visualized by selective angiography and is small. There was mildly calcified vessel disease.   Prox RCA lesion is 50% stenosed.   Mid RCA lesion is 60% stenosed.   Acute Marginal Branch   The vessel is small and is angiographically normal.   Right Ventricular Branch   The vessel is small and is angiographically normal.   Right Posterior Descending Artery   The vessel is small and is angiographically normal.     Assessment and Plan:     # Nonobstructive coronary artery disease  -No exertional chest pain.  Physically active.  -Coronary angiogram 5/2021 showed moderate non-obstructive coronary artery disease.   -She reports mild lightheadedness (not orthostatic) over the last 1 year.  She has stopped a lot of her medications.  She started blood pressure medication amlodipine 5 mg over  the last 2 months.  No worsening dizziness.  Recommend to continue amlodipine 5 mg  -Recommend to start aspirin 81 mg.  -Start Crestor 10 mg daily (patient tells me that she has stopped statin thinking that it might be cause of body aches.  Body aches did not change since he is off of statin.  Willing to restart statin)    # Hypertension  -Continue amlodipine 5 mg daily  -Blood pressure high in clinic today.  Recommend to monitor at home and bring the log to her next nephrology appointment.    #Nonpositional dizziness and lightheadedness  -Unclear why she is feeling like that.  Recommend to monitor blood pressure at home when she is feeling lightheaded.    Medication changes:  -Start aspirin 81 mg  -Start Crestor 10 mg  -Continue amlodipine 5 mg    RTC as needed.    I will see patient as needed.      Total time spent 60 minutes including precharting, face-to-face clinic visit, review of labs/imaging and medical documentation     Rachana QUINN MD  [facility] Division of Cardiology  Pager [phone]

## 2022-08-16 NOTE — NURSING NOTE
"Chief Complaint   Patient presents with     Coronary Artery Disease     Dr Mansfield , new pt. Hx of COPD, intermittent CP, 2017- CTA- Diffuse ds RCA, moderate Left Main. Hx hypertension, hypothyroid.        Initial BP (!) 157/80 (BP Location: Right arm, Patient Position: Chair, Cuff Size: Adult Regular)   Pulse 83   Wt 65.8 kg (145 lb)   SpO2 99%   BMI 27.40 kg/m   Estimated body mass index is 27.4 kg/m  as calculated from the following:    Height as of 1/10/22: 1.549 m (5' 1\").    Weight as of this encounter: 65.8 kg (145 lb)..  BP completed using cuff size: regular    YARELI Rothman  "

## 2022-08-16 NOTE — PATIENT INSTRUCTIONS
Thank you for coming to the [facility] @ [facility]; please note the following instructions:    1. START: rosuvastatin (CRESTOR) 10 MG tablet    2. Take Aspirin 81 mg everyday    3. Please continue checking your blood pressure at home and record your readings. Please communicate with your kidney doctor regarding the readings.    4. Follow up as needed in cardiology        If you have any questions regarding your visit please contact your care team:     Cardiology  Telephone Number   Nadira VELAZQUEZ., RN  Chloe TAYLOR, RN   Nicole QUINTERO, RMTARAS RICHARDSON, YARELI NINA, Visit Facilitator   [phone] (option 1)   For scheduling appts:     [phone] (select option 1)       For the Device Clinic (Pacemakers and ICD's)  RN's :  Joyce Burns   During business hours: [phone]    *After business hours:  [phone] (select option 4)      Normal test result notifications will be released via [employer] or mailed within 7 business days.  All other test results, will be communicated via telephone once reviewed by your cardiologist.    If you need a medication refill please contact your pharmacy.  Please allow 3 business days for your refill to be completed.    As always, thank you for trusting us with your health care needs!

## 2022-09-23 ENCOUNTER — TRANSFERRED RECORDS (OUTPATIENT)
Dept: FAMILY MEDICINE | Facility: CLINIC | Age: 82
End: 2022-09-23

## 2022-09-23 LAB
ALT SERPL-CCNC: 16 IU/L (ref 5–35)
AST SERPL-CCNC: 26 U/L (ref 5–34)
CREATININE (EXTERNAL): 1.16 MG/DL (ref 0.5–1.3)

## 2022-09-30 ENCOUNTER — OFFICE VISIT (OUTPATIENT)
Dept: FAMILY MEDICINE | Facility: CLINIC | Age: 82
End: 2022-09-30
Payer: MEDICARE

## 2022-09-30 VITALS
WEIGHT: 143 LBS | TEMPERATURE: 97.4 F | DIASTOLIC BLOOD PRESSURE: 68 MMHG | SYSTOLIC BLOOD PRESSURE: 130 MMHG | HEART RATE: 87 BPM | RESPIRATION RATE: 20 BRPM | BODY MASS INDEX: 27.02 KG/M2 | OXYGEN SATURATION: 99 %

## 2022-09-30 DIAGNOSIS — E03.9 HYPOTHYROIDISM, UNSPECIFIED TYPE: ICD-10-CM

## 2022-09-30 DIAGNOSIS — M10.9 GOUT, UNSPECIFIED CAUSE, UNSPECIFIED CHRONICITY, UNSPECIFIED SITE: ICD-10-CM

## 2022-09-30 DIAGNOSIS — Z86.16 HISTORY OF 2019 NOVEL CORONAVIRUS DISEASE (COVID-19): Primary | ICD-10-CM

## 2022-09-30 DIAGNOSIS — N18.32 STAGE 3B CHRONIC KIDNEY DISEASE (H): ICD-10-CM

## 2022-09-30 DIAGNOSIS — E55.9 VITAMIN D DEFICIENCY: ICD-10-CM

## 2022-09-30 DIAGNOSIS — Z23 NEED FOR PROPHYLACTIC VACCINATION AND INOCULATION AGAINST INFLUENZA: ICD-10-CM

## 2022-09-30 LAB
HOLD SPECIMEN: NORMAL
T4 FREE SERPL-MCNC: 1.41 NG/DL (ref 0.76–1.46)
TSH SERPL DL<=0.005 MIU/L-ACNC: 0.29 MU/L (ref 0.4–4)

## 2022-09-30 PROCEDURE — 84439 ASSAY OF FREE THYROXINE: CPT | Performed by: FAMILY MEDICINE

## 2022-09-30 PROCEDURE — 90662 IIV NO PRSV INCREASED AG IM: CPT | Performed by: FAMILY MEDICINE

## 2022-09-30 PROCEDURE — 82306 VITAMIN D 25 HYDROXY: CPT | Performed by: FAMILY MEDICINE

## 2022-09-30 PROCEDURE — G0008 ADMIN INFLUENZA VIRUS VAC: HCPCS | Performed by: FAMILY MEDICINE

## 2022-09-30 PROCEDURE — 99214 OFFICE O/P EST MOD 30 MIN: CPT | Mod: 25 | Performed by: FAMILY MEDICINE

## 2022-09-30 PROCEDURE — 84443 ASSAY THYROID STIM HORMONE: CPT | Performed by: FAMILY MEDICINE

## 2022-09-30 PROCEDURE — 36415 COLL VENOUS BLD VENIPUNCTURE: CPT | Performed by: FAMILY MEDICINE

## 2022-09-30 ASSESSMENT — PAIN SCALES - GENERAL: PAINLEVEL: NO PAIN (0)

## 2022-10-01 DIAGNOSIS — E03.9 HYPOTHYROIDISM, UNSPECIFIED TYPE: Primary | ICD-10-CM

## 2022-10-03 LAB — DEPRECATED CALCIDIOL+CALCIFEROL SERPL-MC: 51 UG/L (ref 20–75)

## 2022-10-11 ENCOUNTER — TRANSFERRED RECORDS (OUTPATIENT)
Dept: HEALTH INFORMATION MANAGEMENT | Facility: CLINIC | Age: 82
End: 2022-10-11

## 2022-10-18 ENCOUNTER — TRANSFERRED RECORDS (OUTPATIENT)
Dept: HEALTH INFORMATION MANAGEMENT | Facility: CLINIC | Age: 82
End: 2022-10-18

## 2022-10-26 ENCOUNTER — TRANSFERRED RECORDS (OUTPATIENT)
Dept: HEALTH INFORMATION MANAGEMENT | Facility: CLINIC | Age: 82
End: 2022-10-26

## 2022-10-27 ENCOUNTER — ANCILLARY PROCEDURE (OUTPATIENT)
Dept: CT IMAGING | Facility: CLINIC | Age: 82
End: 2022-10-27
Attending: STUDENT IN AN ORGANIZED HEALTH CARE EDUCATION/TRAINING PROGRAM
Payer: MEDICARE

## 2022-10-27 DIAGNOSIS — M10.9 GOUT: ICD-10-CM

## 2022-10-27 PROCEDURE — 73700 CT LOWER EXTREMITY W/O DYE: CPT | Mod: RT | Performed by: RADIOLOGY

## 2022-10-27 PROCEDURE — 99207 CT FOOT RIGHT W/O CONTRAST: CPT

## 2022-11-10 ENCOUNTER — LAB (OUTPATIENT)
Dept: LAB | Facility: CLINIC | Age: 82
End: 2022-11-10
Payer: MEDICARE

## 2022-11-10 DIAGNOSIS — Z13.220 SCREENING FOR HYPERLIPIDEMIA: ICD-10-CM

## 2022-11-10 DIAGNOSIS — E03.9 HYPOTHYROIDISM, UNSPECIFIED TYPE: Primary | ICD-10-CM

## 2022-11-10 DIAGNOSIS — E03.9 HYPOTHYROIDISM, UNSPECIFIED TYPE: ICD-10-CM

## 2022-11-10 DIAGNOSIS — N18.32 CHRONIC KIDNEY DISEASE, STAGE 3B (H): ICD-10-CM

## 2022-11-10 DIAGNOSIS — N18.32 STAGE 3B CHRONIC KIDNEY DISEASE (H): ICD-10-CM

## 2022-11-10 DIAGNOSIS — I25.10 CORONARY ARTERY DISEASE INVOLVING NATIVE CORONARY ARTERY OF NATIVE HEART WITHOUT ANGINA PECTORIS: ICD-10-CM

## 2022-11-10 LAB
ALBUMIN MFR UR ELPH: 6.9 MG/DL
ALBUMIN SERPL-MCNC: 3.8 G/DL (ref 3.4–5)
ALBUMIN UR-MCNC: NEGATIVE MG/DL
ALP SERPL-CCNC: 118 U/L (ref 40–150)
ANION GAP SERPL CALCULATED.3IONS-SCNC: 5 MMOL/L (ref 3–14)
APPEARANCE UR: CLEAR
BASOPHILS # BLD AUTO: 0 10E3/UL (ref 0–0.2)
BASOPHILS NFR BLD AUTO: 0 %
BILIRUB UR QL STRIP: NEGATIVE
BUN SERPL-MCNC: 21 MG/DL (ref 7–30)
CALCIUM SERPL-MCNC: 9.7 MG/DL (ref 8.5–10.1)
CHLORIDE BLD-SCNC: 109 MMOL/L (ref 94–109)
CHOLEST SERPL-MCNC: 189 MG/DL
CO2 SERPL-SCNC: 26 MMOL/L (ref 20–32)
COLOR UR AUTO: YELLOW
CREAT SERPL-MCNC: 1.12 MG/DL (ref 0.52–1.04)
CREAT UR-MCNC: 76.8 MG/DL
DEPRECATED CALCIDIOL+CALCIFEROL SERPL-MC: 57 UG/L (ref 20–75)
EOSINOPHIL # BLD AUTO: 0.3 10E3/UL (ref 0–0.7)
EOSINOPHIL NFR BLD AUTO: 3 %
ERYTHROCYTE [DISTWIDTH] IN BLOOD BY AUTOMATED COUNT: 15.3 % (ref 10–15)
FASTING STATUS PATIENT QL REPORTED: YES
GFR SERPL CREATININE-BSD FRML MDRD: 49 ML/MIN/1.73M2
GLUCOSE BLD-MCNC: 110 MG/DL (ref 70–99)
GLUCOSE UR STRIP-MCNC: NEGATIVE MG/DL
HCT VFR BLD AUTO: 39.2 % (ref 35–47)
HDLC SERPL-MCNC: 68 MG/DL
HGB BLD-MCNC: 12.6 G/DL (ref 11.7–15.7)
HGB UR QL STRIP: NEGATIVE
KETONES UR STRIP-MCNC: NEGATIVE MG/DL
LDLC SERPL CALC-MCNC: 90 MG/DL
LEUKOCYTE ESTERASE UR QL STRIP: NEGATIVE
LYMPHOCYTES # BLD AUTO: 1.5 10E3/UL (ref 0.8–5.3)
LYMPHOCYTES NFR BLD AUTO: 15 %
MCH RBC QN AUTO: 28.8 PG (ref 26.5–33)
MCHC RBC AUTO-ENTMCNC: 32.1 G/DL (ref 31.5–36.5)
MCV RBC AUTO: 90 FL (ref 78–100)
MONOCYTES # BLD AUTO: 1.1 10E3/UL (ref 0–1.3)
MONOCYTES NFR BLD AUTO: 11 %
NEUTROPHILS # BLD AUTO: 6.7 10E3/UL (ref 1.6–8.3)
NEUTROPHILS NFR BLD AUTO: 70 %
NITRATE UR QL: NEGATIVE
NONHDLC SERPL-MCNC: 121 MG/DL
PH UR STRIP: 5.5 [PH] (ref 5–7)
PHOSPHATE SERPL-MCNC: 3.5 MG/DL (ref 2.5–4.5)
PLATELET # BLD AUTO: 357 10E3/UL (ref 150–450)
POTASSIUM BLD-SCNC: 4.1 MMOL/L (ref 3.4–5.3)
PROT/CREAT 24H UR: 0.09 MG/MG CR (ref 0–0.2)
PTH-INTACT SERPL-MCNC: 59 PG/ML (ref 15–65)
RBC # BLD AUTO: 4.37 10E6/UL (ref 3.8–5.2)
RBC #/AREA URNS AUTO: NORMAL /HPF
SODIUM SERPL-SCNC: 140 MMOL/L (ref 133–144)
SP GR UR STRIP: 1.01 (ref 1–1.03)
T4 FREE SERPL-MCNC: 1.47 NG/DL (ref 0.76–1.46)
TRIGL SERPL-MCNC: 154 MG/DL
TSH SERPL DL<=0.005 MIU/L-ACNC: 0.11 MU/L (ref 0.4–4)
UROBILINOGEN UR STRIP-ACNC: 0.2 E.U./DL
WBC # BLD AUTO: 9.6 10E3/UL (ref 4–11)
WBC #/AREA URNS AUTO: NORMAL /HPF

## 2022-11-10 PROCEDURE — 84439 ASSAY OF FREE THYROXINE: CPT

## 2022-11-10 PROCEDURE — 80069 RENAL FUNCTION PANEL: CPT

## 2022-11-10 PROCEDURE — 82306 VITAMIN D 25 HYDROXY: CPT

## 2022-11-10 PROCEDURE — 84075 ASSAY ALKALINE PHOSPHATASE: CPT

## 2022-11-10 PROCEDURE — 81001 URINALYSIS AUTO W/SCOPE: CPT

## 2022-11-10 PROCEDURE — 36415 COLL VENOUS BLD VENIPUNCTURE: CPT

## 2022-11-10 PROCEDURE — 80061 LIPID PANEL: CPT

## 2022-11-10 PROCEDURE — 84156 ASSAY OF PROTEIN URINE: CPT

## 2022-11-10 PROCEDURE — 83970 ASSAY OF PARATHORMONE: CPT

## 2022-11-10 PROCEDURE — 84443 ASSAY THYROID STIM HORMONE: CPT

## 2022-11-10 PROCEDURE — 85025 COMPLETE CBC W/AUTO DIFF WBC: CPT

## 2022-11-10 RX ORDER — LEVOTHYROXINE SODIUM 88 UG/1
88 TABLET ORAL DAILY
Qty: 90 TABLET | Refills: 1 | Status: SHIPPED | OUTPATIENT
Start: 2022-11-10 | End: 2022-12-19

## 2022-11-11 ENCOUNTER — TELEPHONE (OUTPATIENT)
Dept: FAMILY MEDICINE | Facility: CLINIC | Age: 82
End: 2022-11-11

## 2022-11-11 DIAGNOSIS — E53.8 VITAMIN B12 DEFICIENCY (NON ANEMIC): Primary | ICD-10-CM

## 2022-11-11 RX ORDER — CYANOCOBALAMIN 1000 UG/ML
1 INJECTION, SOLUTION INTRAMUSCULAR; SUBCUTANEOUS
Qty: 1 ML | Refills: 11 | Status: SHIPPED | OUTPATIENT
Start: 2022-11-11 | End: 2023-09-12

## 2022-11-11 NOTE — TELEPHONE ENCOUNTER
Routing to Dr. Cardenas    Pt calling for refill of B12 injections. Should pt continue to take these?    Rx pending if appropriate    Mariann Ross RN

## 2022-11-21 ENCOUNTER — OFFICE VISIT (OUTPATIENT)
Dept: NEPHROLOGY | Facility: CLINIC | Age: 82
End: 2022-11-21
Attending: INTERNAL MEDICINE
Payer: MEDICARE

## 2022-11-21 VITALS
DIASTOLIC BLOOD PRESSURE: 62 MMHG | HEART RATE: 81 BPM | WEIGHT: 142.5 LBS | OXYGEN SATURATION: 96 % | SYSTOLIC BLOOD PRESSURE: 131 MMHG | BODY MASS INDEX: 26.93 KG/M2 | TEMPERATURE: 97.8 F

## 2022-11-21 DIAGNOSIS — N18.31 STAGE 3A CHRONIC KIDNEY DISEASE (H): Primary | ICD-10-CM

## 2022-11-21 DIAGNOSIS — I10 HYPERTENSION, ESSENTIAL: ICD-10-CM

## 2022-11-21 PROCEDURE — 99215 OFFICE O/P EST HI 40 MIN: CPT | Performed by: INTERNAL MEDICINE

## 2022-11-21 PROCEDURE — G0463 HOSPITAL OUTPT CLINIC VISIT: HCPCS

## 2022-11-21 ASSESSMENT — PAIN SCALES - GENERAL: PAINLEVEL: NO PAIN (0)

## 2022-11-21 NOTE — NURSING NOTE
Chief Complaint   Patient presents with     RECHECK     6 mo f/u       /62   Pulse 81   Temp 97.8  F (36.6  C) (Oral)   Wt 64.6 kg (142 lb 8 oz)   SpO2 96%   BMI 26.93 kg/m      Alec Beckwith on 11/21/2022 at 9:46 AM

## 2022-11-21 NOTE — LETTER
11/21/2022       RE: Yvonne Maria  [address] [address]     Dear Colleague,    Thank you for referring your patient, Yvonne Maria, to the [facility] [place] at [facility]. Please see a copy of my visit note below.      Nephrology Progress Note  11/20/2022   Chief complaint: Follow-up CKD  History of Present Illness:    The patient is a 82 year old female with history of gouty? arthritis, skin cancer, COPD,CAD,  hypertension who is here for chronic kidney disease follow-up.      The patient has normal creatinine back in 2012 with baseline creatinine of 0.8-0.9.  She started to have elevated creatinine in the range of 1.1-1.3 back in 2013.  And since then creatinine has been progressively increased and fluctuating between 1.1-1.3.  Her labs on 4/29/2022 from [facility] showed creatinine of 1.41.  And at that time she was worried and started to make some changes about her diet.  She stopped taking vitamin D and reduce the amount of potassium and phosphorus intake.  She also stopped taking potassium pills.  The most recent creatinine on 5/18/2022 showed creatinine of 1.29 with EGFR 42. UA on 5/18/2022 showed no red blood cell and no protein.UPCR is normal, 0.09.  She has history celiac disease and on vegetarian diet. She has HTN for about 15-20 years ago. She was on water pills in the past as she was having fluid retention.  She thought it was related to progesterone. She stopped taking it for 5 years and resumed taking it later. Last year, another diuretics was that for the Meniere's. She was in [place] study in the [place] and it turns out that the patient was in the placebo arm.  The patient has chronic joints and body pain and she typically takes tramadol and sometimes Celebrex but she told me that she rarely takes it.  She thought that the chronic pain could be from fibromyalgia. She sometimes take tylenol. She stop  taking statin because she has lots of muscle ache.She has no leg swelling. She has no problem with urination. She never has heart attack. She has intermittent chest pain. She has no hematuria or stone history. She has CT angiogram of coronary artery in 5/21 which showed severe mid RCA stenosis and moderate stenosis in the proximal to mid LAD. Regarding hypertension, she is on Maxzide (Triamterene 37.5-hydrochlorothiazide 25) 1 tab daily.  5/19/22: Seen for consultation. Stopped Maxzide because hypoNa and hyperK. Started low dose amlodipine blood pressure has been stable without apparent side effect.  11/21/22: She feels ok.  She has no leg swelling.  She does not measure her blood pressure at home.  She thought that she may have gout but it is unclear.  She already stopped taking allopurinol and cholesterol medication.   Labs showed  11/10/22 creatinine 1.12, EGFR 49, potassium 4.1, sodium 140, calcium 9.7, phosphorus 3.5, albumin 3.8.  Vitamin D 57, PTH 59, hemoglobin 12.6.  UA is essentially clean without protein or blood. UPCR 0.09 g/g.     Past medical history  Past Medical History:   Diagnosis Date     Arthritis     osteo     Cancer (H)     skin cancer     COPD (chronic obstructive pulmonary disease) (H)      Heart disease      Hypertension      PONV (postoperative nausea and vomiting)      Thyroid disease        Past surgical history  Past Surgical History:   Procedure Laterality Date     APPENDECTOMY       BACK SURGERY       CHOLECYSTECTOMY       CV CORONARY ANGIOGRAM N/A 5/28/2021    Procedure: CV CORONARY ANGIOGRAM;  Surgeon: Moses Styles MD;  Location:  HEART CARDIAC CATH LAB     ENT SURGERY      tonsils     EYE SURGERY      cataracts     GI SURGERY      hiatel hernia repair         Review of Systems:   14 systems were reviewed and all negative except as mentioned above.   Current Medications:  Current Outpatient Medications   Medication     allopurinol (ZYLOPRIM) 100 MG tablet     amLODIPine  (NORVASC) 5 MG tablet     aspirin (ASA) 81 MG EC tablet     Budeson-Glycopyrrol-Formoterol (BREZTRI AEROSPHERE) 160-9-4.8 MCG/ACT AERO     Budeson-Glycopyrrol-Formoterol (BREZTRI AEROSPHERE) 160-9-4.8 MCG/ACT AERO     cyanocobalamin (CYANOCOBALAMIN) 1000 MCG/ML injection     Humidifier MISC     hydrocortisone valerate (WEST-YVROSE) 0.2 % ointment     levothyroxine (SYNTHROID/LEVOTHROID) 88 MCG tablet     nitroGLYcerin (NITROSTAT) 0.4 MG sublingual tablet     predniSONE (DELTASONE) 5 MG tablet     PREVIDENT 5000 BOOSTER PLUS 1.1 % PSTE     rosuvastatin (CRESTOR) 10 MG tablet     traMADol (ULTRAM) 50 MG tablet     No current facility-administered medications for this visit.       Physical Exam:   There were no vitals taken for this visit.   There is no height or weight on file to calculate BMI.    GENERAL APPEARANCE: Alert, not in acute distress  EYES:  Not pale conjunctiva, pupils equal  HENT: Mouth without ulcers or lesions  PULM: lungs clear to auscultation bilaterally, equal air movement, no clubbing  CV: regular rhythm, normal rate, no rub     -JVD no distended.      -edema: none  GI: soft,  - tender, no distended, bowel sounds are present  INTEGUMENT: No rash  NEURO:  Non focal. No asterixis.     Labs:   All labs reviewed by me  Last Renal Panel:  Sodium   Date Value Ref Range Status   11/10/2022 140 133 - 144 mmol/L Final   05/28/2021 138 133 - 144 mmol/L Final     Potassium   Date Value Ref Range Status   11/10/2022 4.1 3.4 - 5.3 mmol/L Final   05/28/2021 4.4 3.4 - 5.3 mmol/L Final     Chloride   Date Value Ref Range Status   11/10/2022 109 94 - 109 mmol/L Final   05/28/2021 103 94 - 109 mmol/L Final     Carbon Dioxide   Date Value Ref Range Status   05/28/2021 30 20 - 32 mmol/L Final     Carbon Dioxide (CO2)   Date Value Ref Range Status   11/10/2022 26 20 - 32 mmol/L Final     Anion Gap   Date Value Ref Range Status   11/10/2022 5 3 - 14 mmol/L Final   05/28/2021 4 3 - 14 mmol/L Final     Glucose   Date Value  Ref Range Status   11/10/2022 110 (H) 70 - 99 mg/dL Final   05/28/2021 113 (H) 70 - 99 mg/dL Final     Urea Nitrogen   Date Value Ref Range Status   11/10/2022 21 7 - 30 mg/dL Final   05/28/2021 19 7 - 30 mg/dL Final     Creatinine   Date Value Ref Range Status   11/10/2022 1.12 (H) 0.52 - 1.04 mg/dL Final   05/28/2021 1.14 (H) 0.52 - 1.04 mg/dL Final     GFR Estimate   Date Value Ref Range Status   11/10/2022 49 (L) >60 mL/min/1.73m2 Final     Comment:     Effective December 21, 2021 eGFRcr in adults is calculated using the 2021 CKD-EPI creatinine equation which includes age and gender (Robbie et al., NE, DOI: 10.1056/FTSXii3307372)   05/28/2021 45 (L) >60 mL/min/[1.73_m2] Final     Comment:     Non  GFR Calc  Starting 12/18/2018, serum creatinine based estimated GFR (eGFR) will be   calculated using the Chronic Kidney Disease Epidemiology Collaboration   (CKD-EPI) equation.       Calcium   Date Value Ref Range Status   11/10/2022 9.7 8.5 - 10.1 mg/dL Final   05/28/2021 9.9 8.5 - 10.1 mg/dL Final     Phosphorus   Date Value Ref Range Status   11/10/2022 3.5 2.5 - 4.5 mg/dL Final     Albumin   Date Value Ref Range Status   11/10/2022 3.8 3.4 - 5.0 g/dL Final       Imaging:  I reviewed imaging studies.   1. The kidneys are mildly atrophic, more prominently on the left.    2. A 0.7 cm echogenic focus in the interpolar region of the left  kidney is suspicious for a nonobstructing stone.  Ultrasound renal without Dopplers on 5/23/2022    Assessment & Recommendations:   Problem list  # CKD stage 3A secondary to chronic hypertension and NSAIDs use  # Baseline Cr 1.1-1.4  The patient started to have creatinine elevation at least since 2013.  But over the years creatinine has not changed much.  Currently her creatinine has been hovering between 1.1-1.4.  Her current EGFR is 42-46.  Etiology of her chronic kidney disease likely multifactorial in the setting of chronic hypertension and prior NSAID use.  She  was an ex-smoker which could also contribute to kidney injury as well.  During last visit, the patient was noted to have mild hypercalcemia at 10.7, hyponatremia at 131 and hypokalemia 3.1.  So we elected to stopped Maxzide and these electrolytes abnormalities are resolved.  We switch blood pressure medication to amlodipine at 5 mg daily and it controls her blood pressure beautifully without significant side effect.  # Hypokalemia  # Mild intermittent hyponatremia  This is likely in the setting of diuretics induced.  Her blood pressure today is 130/75.  I decided to stop Maxzide and start her on low-dose amlodipine 2.5 mg daily.  She will let us know the blood pressure in 2 weeks.  Discussed potential side effects with her which include potential lower extremity edema.  # HTN  Blood pressure is excellent today at 131/62.  She is currently on low-salt diet.  She is also on amlodipine 5 mg daily.   #Mild hypercalcemia-> resolved after stopping Maxzide  #Hyper Vitamin D  PTH is 59.  Vitamin D level is down to 57 from 82.  She used to take high-dose vitamin D but dose has been reduced. She currently takes about total 1400 Units of Vit D (1000 U from the vit D and 400 from [employer]).   #Anemia surveillance  Hemoglobin is 12.6.  #History of hiatal hernia  #Chronic pain on tramadol  # A 0.7 cm echogenic focus at the interpolar region of the left  kidney is suspicious for a nonobstructing stone   - Continue to watch for symptoms and stay well hydrated  # Query gouty arthritis  Followed by PCP. She used to be on  allopurinol but now stopped.  She is managing this with diet for now.  Discussed uric acid that she could avoid.    Follow-up in 6 months with labs    I spent  40 minutes on the date of the encounter doing chart review, history and exam, documentation and further activities as noted above. 20 minutes of this visit is dedicated to direct patient interaction via face-to-face.    Patricia Porras MD on 11/20/2022

## 2022-12-19 ENCOUNTER — OFFICE VISIT (OUTPATIENT)
Dept: FAMILY MEDICINE | Facility: CLINIC | Age: 82
End: 2022-12-19
Payer: MEDICARE

## 2022-12-19 VITALS
RESPIRATION RATE: 24 BRPM | OXYGEN SATURATION: 100 % | BODY MASS INDEX: 28.27 KG/M2 | HEIGHT: 60 IN | DIASTOLIC BLOOD PRESSURE: 74 MMHG | HEART RATE: 77 BPM | WEIGHT: 144 LBS | TEMPERATURE: 97.4 F | SYSTOLIC BLOOD PRESSURE: 138 MMHG

## 2022-12-19 DIAGNOSIS — M47.812 OSTEOARTHRITIS OF CERVICAL SPINE, UNSPECIFIED SPINAL OSTEOARTHRITIS COMPLICATION STATUS: Primary | ICD-10-CM

## 2022-12-19 DIAGNOSIS — M48.02 SPINAL STENOSIS IN CERVICAL REGION: ICD-10-CM

## 2022-12-19 DIAGNOSIS — E03.9 HYPOTHYROIDISM, UNSPECIFIED TYPE: ICD-10-CM

## 2022-12-19 DIAGNOSIS — G89.29 CHRONIC MIDLINE LOW BACK PAIN WITHOUT SCIATICA: ICD-10-CM

## 2022-12-19 DIAGNOSIS — M81.0 OSTEOPOROSIS WITHOUT CURRENT PATHOLOGICAL FRACTURE, UNSPECIFIED OSTEOPOROSIS TYPE: ICD-10-CM

## 2022-12-19 DIAGNOSIS — Z23 HIGH PRIORITY FOR 2019-NCOV VACCINE: ICD-10-CM

## 2022-12-19 DIAGNOSIS — M54.50 CHRONIC MIDLINE LOW BACK PAIN WITHOUT SCIATICA: ICD-10-CM

## 2022-12-19 PROCEDURE — 0124A COVID-19 VACCINE BIVALENT BOOSTER 12+ (PFIZER): CPT | Performed by: FAMILY MEDICINE

## 2022-12-19 PROCEDURE — 99214 OFFICE O/P EST MOD 30 MIN: CPT | Performed by: FAMILY MEDICINE

## 2022-12-19 PROCEDURE — 91312 COVID-19 VACCINE BIVALENT BOOSTER 12+ (PFIZER): CPT | Performed by: FAMILY MEDICINE

## 2022-12-19 RX ORDER — TRAMADOL HYDROCHLORIDE 50 MG/1
TABLET ORAL
Qty: 60 TABLET | Refills: 4 | Status: SHIPPED | OUTPATIENT
Start: 2022-12-19 | End: 2023-03-16

## 2022-12-19 RX ORDER — CYCLOBENZAPRINE HCL 5 MG
5 TABLET ORAL
Qty: 30 TABLET | Refills: 3 | Status: SHIPPED | OUTPATIENT
Start: 2022-12-19 | End: 2023-05-11

## 2022-12-19 RX ORDER — LEVOTHYROXINE SODIUM 88 UG/1
88 TABLET ORAL DAILY
Qty: 90 TABLET | Refills: 3 | Status: SHIPPED | OUTPATIENT
Start: 2022-12-19 | End: 2023-05-04

## 2022-12-19 ASSESSMENT — PAIN SCALES - GENERAL: PAINLEVEL: NO PAIN (0)

## 2022-12-19 NOTE — PROGRESS NOTES
Assessment & Plan     Osteoarthritis of cervical spine, unspecified spinal osteoarthritis complication status  Currently, she has been doing physical therapy.  Previously she was seen by a spinal surgeon.  She did have an MRI done on October 11, 2022 which showed she has severe degenerative changes, at multiple levels.  In addition, severe spinal stenosis.  She has no upper extremity weakness or numbness.  She has more neck stiffness and pain.  Advised with supportive care, continue with physical therapy.  Continue tramadol as needed.  In addition added Flexeril to take at 1 tablet at bedtime as needed.  May consider cortiosone injections.  - traMADol (ULTRAM) 50 MG tablet; One tab bid as needed  - cyclobenzaprine (FLEXERIL) 5 MG tablet; Take 1 tablet (5 mg) by mouth nightly as needed for muscle spasms    Osteoporosis without current pathological fracture, unspecified osteoporosis type    - traMADol (ULTRAM) 50 MG tablet; One tab bid as needed  - cyclobenzaprine (FLEXERIL) 5 MG tablet; Take 1 tablet (5 mg) by mouth nightly as needed for muscle spasms    Hypothyroidism, unspecified type  Last TSH from a month ago was on the low side.  Levothyroxine was readjusted.    Will repeat TSH in 8- 10 weeks  - levothyroxine (SYNTHROID/LEVOTHROID) 88 MCG tablet; Take 1 tablet (88 mcg) by mouth daily    High priority for 2019-nCoV vaccine    - COVID-19,PF,PFIZER BOOSTER BIVALENT 12+Yrs    Chronic midline low back pain without sciatica    - traMADol (ULTRAM) 50 MG tablet; One tab bid as needed  - cyclobenzaprine (FLEXERIL) 5 MG tablet; Take 1 tablet (5 mg) by mouth nightly as needed for muscle spasms    Spinal stenosis in cervical region    - traMADol (ULTRAM) 50 MG tablet; One tab bid as needed  - cyclobenzaprine (FLEXERIL) 5 MG tablet; Take 1 tablet (5 mg) by mouth nightly as needed for muscle spasms    BMI:   Estimated body mass index is 27.9 kg/m  as calculated from the following:    Height as of this encounter: 1.53 m (5'  "0.24\").    Weight as of this encounter: 65.3 kg (144 lb).   Weight management plan noted, stable and monitoring    Work on weight loss  Regular exercise    Return in about 6 months (around 6/19/2023) for Routine preventive, in person.    Rina Cardenas MD  [facility] [place]    Grace Hurley is a 82 year old, presenting for the following health issues:  Recheck Medication and Arthritis  History of chronic kidney disease, she has follow-up with nephrology, kidney function has been stable.  History of multiple joint pain, osteoarthritis neck, low  back.  Currently she is doing physical therapy.  History of hypothyroidism    History of Present Illness       Back Pain:  She presents for follow up of back pain. Patient's back pain is a chronic problem.  Location of back pain:  Left lower back, right side of neck, left side of neck and left hip  Description of back pain: dull ache and sharp  Back pain spreads: left buttocks, left thigh, right side of neck and left side of neck    Since patient first noticed back pain, pain is: always present, but gets better and worse  Does back pain interfere with her job:  Not applicable  Has the patient tried any new treatments:  No       Reason for visit:  Body aches    She eats 2-3 servings of fruits and vegetables daily.She consumes 0 sweetened beverage(s) daily.She exercises with enough effort to increase her heart rate 10 to 19 minutes per day.  She exercises with enough effort to increase her heart rate 4 days per week.   She is taking medications regularly.       Review of Systems   Constitutional, HEENT, cardiovascular, pulmonary, gi and gu systems are negative, except as otherwise noted.      Objective    There were no vitals taken for this visit.  There is no height or weight on file to calculate BMI.  Physical Exam   GENERAL: healthy, alert and no distress  HENT: ear canals and TM's normal, nose and mouth without ulcers or lesions  RESP: lungs clear " to auscultation - no rales, rhonchi or wheezes  CV: regular rate and rhythm, normal S1 S2, no S3 or S4, no murmur, click or rub, no peripheral edema and peripheral pulses strong  ABDOMEN: soft, nontender, no hepatosplenomegaly, no masses and bowel sounds normal  MS: no gross musculoskeletal defects noted, no edema  BACK: no CVA tenderness, no paralumbar tenderness  PSYCH: mentation appears normal, affect normal/bright    TSH   Date Value Ref Range Status   11/10/2022 0.11 (L) 0.40 - 4.00 mU/L Final   06/13/2016 0.59 0.40 - 4.00 mU/L Final       Rina Cardenas MD

## 2023-01-11 ENCOUNTER — OFFICE VISIT (OUTPATIENT)
Dept: PULMONOLOGY | Facility: CLINIC | Age: 83
End: 2023-01-11
Payer: MEDICARE

## 2023-01-11 VITALS
OXYGEN SATURATION: 98 % | RESPIRATION RATE: 12 BRPM | BODY MASS INDEX: 28.03 KG/M2 | HEART RATE: 72 BPM | SYSTOLIC BLOOD PRESSURE: 149 MMHG | WEIGHT: 142.8 LBS | HEIGHT: 60 IN | DIASTOLIC BLOOD PRESSURE: 76 MMHG

## 2023-01-11 DIAGNOSIS — J43.9 PULMONARY EMPHYSEMA, UNSPECIFIED EMPHYSEMA TYPE (H): ICD-10-CM

## 2023-01-11 PROCEDURE — 99215 OFFICE O/P EST HI 40 MIN: CPT | Performed by: INTERNAL MEDICINE

## 2023-01-11 RX ORDER — BUDESONIDE, GLYCOPYRROLATE, AND FORMOTEROL FUMARATE 160; 9; 4.8 UG/1; UG/1; UG/1
2 AEROSOL, METERED RESPIRATORY (INHALATION) 2 TIMES DAILY
Qty: 10.7 G | Refills: 11 | Status: SHIPPED | OUTPATIENT
Start: 2023-01-11 | End: 2023-02-10

## 2023-01-11 RX ORDER — GUAIFENESIN 600 MG/1
1200 TABLET, EXTENDED RELEASE ORAL 2 TIMES DAILY
COMMUNITY
End: 2023-02-08

## 2023-01-11 NOTE — PROGRESS NOTES
"Yvonne Maria's goals for this visit include: Return  She requests these members of her care team be copied on today's visit information: PCP    PCP: Rina Cardenas    Referring Provider:  No referring provider defined for this encounter.    BP (!) 155/74   Pulse 72   Resp 12   Ht 1.53 m (5' 0.24\")   Wt 64.8 kg (142 lb 12.8 oz)   SpO2 98%   BMI 27.67 kg/m      Do you need any medication refills at today's visit? N    Renay Francisco LPN  Pulmonary Medicine:  [facility]  Phone: [phone] Fax: [phone]      "

## 2023-01-11 NOTE — PROGRESS NOTES
Pulmonary Clinic Return Patient Visit  Reason for Visit: COPD  History of Present Illness  Yvonne Maria is a pleasant 82-year-old with a history of CAD and COPD who presents for a follow up of COPD.  I last saw her in clinic in 1/2022.  To briefly review, she was diagnosed with COPD. Historically, she has been found to have significant emphysema with relatively normal PFTs.  She is on a regimen of nebulized budesonide which she takes twice a day.  She hardly uses her albuterol rescue inhaler. When I saw her in clinic, she had mild symptoms and I switched her regimen to triple inhaler therapy- Breztri for ease of use and better response.   Today, doing great and has minimal symptoms with minimal SOB. She continues to cough but has done well with excellent airway clearance with Mucinex which she now tales at an increased dose of 1200 mg BID. There has been no COPD flares since the last clinic visit and she has no need for rescue inhalers She is also able to perform most of her IADLs. Her daughter helps with some of the cleaning around the house. Walking on flat surface is without any issues. She was recently diagnosed with COVID 19 with very mild symptoms and back to baseline. She was also found to have osteoporosis and renal dysfunction.  Hx of CAD without any interventions. Risk and lifestyle modifications recommended.   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 No family history of lung cancer or chronic lung disease. She has a 2 dogs. Lives in a house which was built in 1974, no flooding issues. There are some molds in the house but she has not been exposed to.    Review of Systems:  10 of 14 systems reviewed and are negative unless otherwise stated in HPI.    Past Medical History:   Diagnosis Date     Arthritis     osteo     Cancer (H)     skin cancer     COPD (chronic obstructive pulmonary disease) (H)      Heart disease      Hypertension      PONV (postoperative nausea and vomiting)       Spinal stenosis in cervical region 2022     Thyroid disease        Past Surgical History:   Procedure Laterality Date     APPENDECTOMY       BACK SURGERY       CHOLECYSTECTOMY       CV CORONARY ANGIOGRAM N/A 2021    Procedure: CV CORONARY ANGIOGRAM;  Surgeon: Moses Styles MD;  Location:  HEART CARDIAC CATH LAB     ENT SURGERY      tonsils     EYE SURGERY      cataracts     GI SURGERY      hiatel hernia repair       Family History   Problem Relation Age of Onset     Myocardial Infarction Father      Heart Disease Maternal Grandmother      Lymphoma Maternal Grandfather      Heart Disease Paternal Grandmother      Myocardial Infarction Son        Social History     Socioeconomic History     Marital status:      Spouse name: None     Number of children: None     Years of education: None     Highest education level: None   Occupational History     None   Social Needs     Financial resource strain: None     Food insecurity     Worry: None     Inability: None     Transportation needs     Medical: None     Non-medical: None   Tobacco Use     Smoking status: Former Smoker     Packs/day: 1.00     Years: 27.00     Pack years: 27.00     Types: Cigarettes     Quit date: 3/28/1986     Years since quittin.3     Smokeless tobacco: Never Used   Substance and Sexual Activity     Alcohol use: Yes     Alcohol/week: 0.0 standard drinks     Comment: Wine 5-7 glasses per week     Drug use: No     Sexual activity: Not Currently     Partners: Male   Lifestyle     Physical activity     Days per week: None     Minutes per session: None     Stress: None   Relationships     Social connections     Talks on phone: None     Gets together: None     Attends Hoahaoism service: None     Active member of club or organization: None     Attends meetings of clubs or organizations: None     Relationship status: None     Intimate partner violence     Fear of current or ex partner: None     Emotionally abused: None      "Physically abused: None     Forced sexual activity: None   Other Topics Concern     Parent/sibling w/ CABG, MI or angioplasty before 65F 55M? No   Social History Narrative     None         Allergies   Allergen Reactions     Gluten Meal Diarrhea     Celiac disease  Celiac disease       Pregabalin Swelling     Other reaction(s): Edema  Swelling legs and hands       Tetracyclines      Other reaction(s): *Unknown, Dermatitis  Other reaction(s): rash  PN: LW Reaction: rash  questionable allergy  questionable allergy       Wheat Bran      Allopurinol Itching         Current Outpatient Medications:      amLODIPine (NORVASC) 5 MG tablet, Take 1 tablet (5 mg) by mouth daily, Disp: 90 tablet, Rfl: 3     Budeson-Glycopyrrol-Formoterol (BREZTRI AEROSPHERE) 160-9-4.8 MCG/ACT AERO, Inhale 2 puffs into the lungs 2 times daily, Disp: 32.1 g, Rfl: 3     Budeson-Glycopyrrol-Formoterol (BREZTRI AEROSPHERE) 160-9-4.8 MCG/ACT AERO, , Disp: , Rfl:      cyanocobalamin (CYANOCOBALAMIN) 1000 MCG/ML injection, Inject 1 mL (1,000 mcg) into the muscle every 30 days, Disp: 1 mL, Rfl: 11     cyclobenzaprine (FLEXERIL) 5 MG tablet, Take 1 tablet (5 mg) by mouth nightly as needed for muscle spasms, Disp: 30 tablet, Rfl: 3     guaiFENesin (MUCINEX) 600 MG 12 hr tablet, Take 1,200 mg by mouth 2 times daily, Disp: , Rfl:      Humidifier MISC, 1 Device daily, Disp: 1 each, Rfl: 0     hydrocortisone valerate (WEST-YVROSE) 0.2 % ointment, Apply topically as needed, Disp: , Rfl:      levothyroxine (SYNTHROID/LEVOTHROID) 88 MCG tablet, Take 1 tablet (88 mcg) by mouth daily, Disp: 90 tablet, Rfl: 3     PREVIDENT 5000 BOOSTER PLUS 1.1 % PSTE, , Disp: , Rfl:      traMADol (ULTRAM) 50 MG tablet, One tab bid as needed, Disp: 60 tablet, Rfl: 4      Physical Exam:  BP (!) 155/74   Pulse 72   Resp 12   Ht 1.53 m (5' 0.24\")   Wt 64.8 kg (142 lb 12.8 oz)   SpO2 98%   BMI 27.67 kg/m    GENERAL: Well developed, well nourished, alert, and in no apparent " distress.  HEENT: Normocephalic, atraumatic. PERRL, EOMI. Oral mucosa is moist. No perioral cyanosis.  NECK: supple, no masses, no thyromegaly.  RESP:  Normal respiratory effort.  CTAB.  No rales, wheezes, rhonchi.  No cyanosis or clubbing.  CV: Normal S1, S2, regular rhythm, normal rate. No murmur.  No LE edema.   ABDOMEN:  Soft, non-tender, non-distended.   SKIN: warm and dry. No rash.  NEURO: AAOx3.  Normal gait.  Fluent speech.  PSYCH: mentation appears normal.     Results:  PFTs: Reviewed and discussed with patient - Normal lung volume, normal flow, normal volume flow loops, preserved diffusion  Most Recent [facility] Pulmonary Function Testing    FVC-Pred   Date Value Ref Range Status   07/01/2021 2.31 L      FVC-Pre   Date Value Ref Range Status   07/01/2021 2.30 L      FVC-%Pred-Pre   Date Value Ref Range Status   07/01/2021 99 %      FEV1-Pre   Date Value Ref Range Status   07/01/2021 1.70 L      FEV1-%Pred-Pre   Date Value Ref Range Status   07/01/2021 96 %      FEV1FVC-Pred   Date Value Ref Range Status   07/01/2021 78 %      FEV1FVC-Pre   Date Value Ref Range Status   07/01/2021 74 %      No results found for: 20029  FEFMax-Pred   Date Value Ref Range Status   07/01/2021 4.44 L/sec      FEFMax-Pre   Date Value Ref Range Status   07/01/2021 3.87 L/sec      FEFMax-%Pred-Pre   Date Value Ref Range Status   07/01/2021 87 %      ExpTime-Pre   Date Value Ref Range Status   07/01/2021 7.72 sec      FIFMax-Pre   Date Value Ref Range Status   07/01/2021 2.08 L/sec      FEV1FEV6-Pred   Date Value Ref Range Status   07/01/2021 78 %      FEV1FEV6-Pre   Date Value Ref Range Status   07/01/2021 74 %      No results found for: 20055  Imaging (personally reviewed in clinic today): CTA Angiogram/ CT Chest 05/12/2021  1. Centrilobular emphysematous changes. No acute airspace disease.      Assessment and Plan:   COPD (Group B)  Good control with a CAT score of 14, although this is increased from the last time we saw, overall  she appears to be doing just fine and she has responded favorably to Breztri inhaler. Her recent diagnosis of osteoporosis was likely due to her prior uses of systemic sterids- prednisone for her diagnosis of PMR and not necessarily due to the inhalers. We will continue Breztri and prescriptions were given today with refills. I encouraged her to continue to remain active  Pulmonary Nodules   this has shown stability since 2018.  There are solid nodules and hence do not need any further surveillance.      Questions and concerns were answered to the patient's satisfaction.  she was provided with my contact information should new questions or concerns arise in the interim.  She should return to clinic in 12 months    I spent a total of 40 minutes face to face with Yvonne Maria during today's office visit. Over 50% of this time was spent counseling the patient and/or coordinating care regarding their pulmonary disease.      Up to date on Prevnar (2015)  Quique Linares MD  Pulmonary, Critical Care and Sleep Medicine  [facility]  Pager: [phone]        The above note was dictated using voice recognition software and may include typographical errors. Please contact the author for any clarifications.

## 2023-01-24 ENCOUNTER — TELEPHONE (OUTPATIENT)
Dept: FAMILY MEDICINE | Facility: CLINIC | Age: 83
End: 2023-01-24
Payer: MEDICARE

## 2023-01-27 ENCOUNTER — TELEPHONE (OUTPATIENT)
Dept: NEPHROLOGY | Facility: CLINIC | Age: 83
End: 2023-01-27
Payer: MEDICARE

## 2023-01-27 NOTE — TELEPHONE ENCOUNTER
[facility] Call Center    Phone Message    May a detailed message be left on voicemail: yes     Reason for Call: Other: Pt calling and stating she has gout and is wondering if Dr. Porras sees for that, pt was in the ER last night and would like Dr. Porras to review those records and give some advice on what she should do and who she should see if Dr. Porras does not see for gout. Thanks!    Action Taken: Message routed to:  [facility] ([facility]): Neph    Travel Screening: Not Applicable

## 2023-02-02 NOTE — TELEPHONE ENCOUNTER
Michael Lal,     Can you call and check on her. I think she should call her PCP and being seen by him or her. Since she has CKD, some gout medications needs to be modified by kidney function.. She should also avoid NSAIDs.     Thanks!,   NK       Call to patient. Attempted to inform her of recommendations per Dr Porras above and she simply wants to know what gout medications are acceptable for her CKD 3 disease. Patient is frustrated as she is not being told what she can take. Currently she reports no gout flair but she would like to know what she can take should it happens again. Will send to Dr Porras for further recommendations  Hali Phillips LPN  Nephrology  [phone]

## 2023-02-08 ENCOUNTER — OFFICE VISIT (OUTPATIENT)
Dept: FAMILY MEDICINE | Facility: CLINIC | Age: 83
End: 2023-02-08
Payer: MEDICARE

## 2023-02-08 VITALS
SYSTOLIC BLOOD PRESSURE: 136 MMHG | RESPIRATION RATE: 20 BRPM | TEMPERATURE: 97.1 F | DIASTOLIC BLOOD PRESSURE: 78 MMHG | HEIGHT: 61 IN | WEIGHT: 141 LBS | BODY MASS INDEX: 26.62 KG/M2 | OXYGEN SATURATION: 99 % | HEART RATE: 80 BPM

## 2023-02-08 DIAGNOSIS — M35.3 PMR (POLYMYALGIA RHEUMATICA) (H): ICD-10-CM

## 2023-02-08 DIAGNOSIS — L03.115 CELLULITIS OF RIGHT LOWER EXTREMITY: Primary | ICD-10-CM

## 2023-02-08 DIAGNOSIS — E03.9 HYPOTHYROIDISM, UNSPECIFIED TYPE: ICD-10-CM

## 2023-02-08 DIAGNOSIS — E78.2 MIXED HYPERLIPIDEMIA: ICD-10-CM

## 2023-02-08 DIAGNOSIS — I10 HYPERTENSION, ESSENTIAL: ICD-10-CM

## 2023-02-08 DIAGNOSIS — M10.9 GOUT, UNSPECIFIED CAUSE, UNSPECIFIED CHRONICITY, UNSPECIFIED SITE: ICD-10-CM

## 2023-02-08 DIAGNOSIS — N18.31 STAGE 3A CHRONIC KIDNEY DISEASE (H): ICD-10-CM

## 2023-02-08 LAB
CHOLEST SERPL-MCNC: 204 MG/DL
HDLC SERPL-MCNC: 68 MG/DL
LDLC SERPL CALC-MCNC: 116 MG/DL
NONHDLC SERPL-MCNC: 136 MG/DL
TRIGL SERPL-MCNC: 101 MG/DL
TSH SERPL DL<=0.005 MIU/L-ACNC: 1.11 UIU/ML (ref 0.3–4.2)
URATE SERPL-MCNC: 6.1 MG/DL (ref 2.4–5.7)

## 2023-02-08 PROCEDURE — 84443 ASSAY THYROID STIM HORMONE: CPT | Performed by: FAMILY MEDICINE

## 2023-02-08 PROCEDURE — 36415 COLL VENOUS BLD VENIPUNCTURE: CPT | Performed by: FAMILY MEDICINE

## 2023-02-08 PROCEDURE — 84550 ASSAY OF BLOOD/URIC ACID: CPT | Performed by: FAMILY MEDICINE

## 2023-02-08 PROCEDURE — 99214 OFFICE O/P EST MOD 30 MIN: CPT | Performed by: FAMILY MEDICINE

## 2023-02-08 PROCEDURE — 80061 LIPID PANEL: CPT | Performed by: FAMILY MEDICINE

## 2023-02-08 RX ORDER — ALLOPURINOL 100 MG/1
TABLET ORAL
Qty: 45 TABLET | Refills: 3 | COMMUNITY
Start: 2023-02-08 | End: 2023-09-11

## 2023-02-08 RX ORDER — FAMOTIDINE 40 MG/1
40 TABLET, FILM COATED ORAL DAILY
COMMUNITY
Start: 2023-02-08 | End: 2023-09-11

## 2023-02-08 ASSESSMENT — PAIN SCALES - GENERAL: PAINLEVEL: NO PAIN (0)

## 2023-02-18 ENCOUNTER — HEALTH MAINTENANCE LETTER (OUTPATIENT)
Age: 83
End: 2023-02-18

## 2023-03-16 ENCOUNTER — TELEPHONE (OUTPATIENT)
Dept: FAMILY MEDICINE | Facility: CLINIC | Age: 83
End: 2023-03-16
Payer: MEDICARE

## 2023-03-16 DIAGNOSIS — M48.02 SPINAL STENOSIS IN CERVICAL REGION: ICD-10-CM

## 2023-03-16 DIAGNOSIS — M47.812 OSTEOARTHRITIS OF CERVICAL SPINE, UNSPECIFIED SPINAL OSTEOARTHRITIS COMPLICATION STATUS: ICD-10-CM

## 2023-03-16 DIAGNOSIS — M54.50 CHRONIC MIDLINE LOW BACK PAIN WITHOUT SCIATICA: ICD-10-CM

## 2023-03-16 DIAGNOSIS — M81.0 OSTEOPOROSIS WITHOUT CURRENT PATHOLOGICAL FRACTURE, UNSPECIFIED OSTEOPOROSIS TYPE: ICD-10-CM

## 2023-03-16 DIAGNOSIS — G89.29 CHRONIC MIDLINE LOW BACK PAIN WITHOUT SCIATICA: ICD-10-CM

## 2023-03-16 RX ORDER — TRAMADOL HYDROCHLORIDE 50 MG/1
TABLET ORAL
Qty: 60 TABLET | Refills: 2 | Status: SHIPPED | OUTPATIENT
Start: 2023-03-16 | End: 2023-09-05

## 2023-03-16 NOTE — TELEPHONE ENCOUNTER
Routing to PCP    Patient requesting tramadol be sent [facility] from [facility].    Pended with 2 refills

## 2023-03-16 NOTE — TELEPHONE ENCOUNTER
Medication Question or Refill    Contacts       Type Contact Phone/Fax    03/16/2023 12:18 PM CDT Phone (Incoming) Mei Maria (Self) [phone] (M)          What medication are you calling about (include dose and sig)?: Tramadol    Preferred Pharmacy:   [facility]      Controlled Substance Agreement on file:   CSA -- Patient Level:     [Media Unavailable] Controlled Substance Agreement - Opioid - Scan on 5/27/2022  7:19 PM       Who prescribed the medication?: Dr Cardenas    Do you need a refill? Yes, Needs new order to be sent to [facility], can not transfer from [facility] to [facility]. Pharmacy states that A new RX is needing to be sent again.    When did you use the medication last? NA    Patient offered an appointment? No    Do you have any questions or concerns?  No      Could we send this information to you in [facility] or would you prefer to receive a phone call?:   Patient would prefer a phone call   Okay to leave a detailed message?: Yes at Home number on file [phone] (home)

## 2023-03-24 ENCOUNTER — TELEPHONE (OUTPATIENT)
Dept: FAMILY MEDICINE | Facility: CLINIC | Age: 83
End: 2023-03-24
Payer: MEDICARE

## 2023-03-24 NOTE — TELEPHONE ENCOUNTER
Forms received from [facility]  for .  Forms placed in provider 'sign me' folder.  Please FAx forms to [phone] after completion.      Sarita Drake    [facility]

## 2023-04-14 ENCOUNTER — TELEPHONE (OUTPATIENT)
Dept: FAMILY MEDICINE | Facility: CLINIC | Age: 83
End: 2023-04-14
Payer: MEDICARE

## 2023-04-14 NOTE — TELEPHONE ENCOUNTER
Routing to pcp    Patient calling. She has a recent gout flare up in her big toe. Last OV on 2/8    She is wondering if it is appropriate to increase her allopurinol dose?    It looks like she is on the renal dose of allopurinol. Are you wanting her to schedule another appt to discuss?    Megan Tang RN

## 2023-04-14 NOTE — TELEPHONE ENCOUNTER
Routing back to pcp    For some reason provider's message is showing up as a sensitive note, RN unable to view    Megan Tang RN

## 2023-04-14 NOTE — TELEPHONE ENCOUNTER
Since your kidney function is low, I would recommend continue with this current dosage.    If she is having more frequent flareups could add colchicine or give her prednisone to use as needed.  Thanks

## 2023-04-17 NOTE — TELEPHONE ENCOUNTER
Called patient  Gout started in her large toe but it has moved up higher on her anterior forefoot.    She reports she still has some prednisone left, 20 mg tabs #7 left, 5 mg tab (more than a week left) that is still good.    What dosing do you recommend starting for prednisone?      Ok to leave detailed if no answer.      Antonella Glass RN

## 2023-04-17 NOTE — TELEPHONE ENCOUNTER
Called patient back and gave below message- pt can take allopurinol and prednisone to help with gout flare up.      Antonella Glass RN

## 2023-05-04 ENCOUNTER — TELEPHONE (OUTPATIENT)
Dept: FAMILY MEDICINE | Facility: CLINIC | Age: 83
End: 2023-05-04
Payer: MEDICARE

## 2023-05-04 DIAGNOSIS — E03.9 HYPOTHYROIDISM, UNSPECIFIED TYPE: ICD-10-CM

## 2023-05-04 RX ORDER — LEVOTHYROXINE SODIUM 88 UG/1
88 TABLET ORAL DAILY
Qty: 90 TABLET | Refills: 1 | Status: SHIPPED | OUTPATIENT
Start: 2023-05-04 | End: 2023-09-12

## 2023-05-04 NOTE — TELEPHONE ENCOUNTER
Patient came into clinic today to request that a new RX for the SYNTHROID/LEVOTHROID) 88 MCG tablet be sent to a new pharmacy. Her old Pharmacy closed and this one prescription was not transferred to the new pharmacy. Patient is stating that she only has 5 days left of this medication.

## 2023-05-11 ENCOUNTER — OFFICE VISIT (OUTPATIENT)
Dept: FAMILY MEDICINE | Facility: CLINIC | Age: 83
End: 2023-05-11
Payer: MEDICARE

## 2023-05-11 VITALS
HEIGHT: 60 IN | WEIGHT: 138 LBS | TEMPERATURE: 97.9 F | DIASTOLIC BLOOD PRESSURE: 62 MMHG | OXYGEN SATURATION: 99 % | RESPIRATION RATE: 20 BRPM | HEART RATE: 79 BPM | SYSTOLIC BLOOD PRESSURE: 128 MMHG | BODY MASS INDEX: 27.09 KG/M2

## 2023-05-11 DIAGNOSIS — M54.50 CHRONIC MIDLINE LOW BACK PAIN WITHOUT SCIATICA: ICD-10-CM

## 2023-05-11 DIAGNOSIS — F11.20 CONTINUOUS OPIOID DEPENDENCE (H): ICD-10-CM

## 2023-05-11 DIAGNOSIS — N18.32 STAGE 3B CHRONIC KIDNEY DISEASE (H): ICD-10-CM

## 2023-05-11 DIAGNOSIS — S39.012A STRAIN OF LUMBAR REGION, INITIAL ENCOUNTER: Primary | ICD-10-CM

## 2023-05-11 DIAGNOSIS — Z23 NEED FOR COVID-19 VACCINE: ICD-10-CM

## 2023-05-11 DIAGNOSIS — M81.0 OSTEOPOROSIS WITHOUT CURRENT PATHOLOGICAL FRACTURE, UNSPECIFIED OSTEOPOROSIS TYPE: ICD-10-CM

## 2023-05-11 DIAGNOSIS — G89.29 CHRONIC MIDLINE LOW BACK PAIN WITHOUT SCIATICA: ICD-10-CM

## 2023-05-11 DIAGNOSIS — I10 HYPERTENSION, ESSENTIAL: ICD-10-CM

## 2023-05-11 DIAGNOSIS — I71.40 ABDOMINAL AORTIC ANEURYSM (AAA) WITHOUT RUPTURE, UNSPECIFIED PART (H): ICD-10-CM

## 2023-05-11 PROCEDURE — 99214 OFFICE O/P EST MOD 30 MIN: CPT | Mod: 25 | Performed by: FAMILY MEDICINE

## 2023-05-11 PROCEDURE — 0124A COVID-19 BIVALENT 12+ (PFIZER): CPT | Performed by: FAMILY MEDICINE

## 2023-05-11 PROCEDURE — 91312 COVID-19 BIVALENT 12+ (PFIZER): CPT | Performed by: FAMILY MEDICINE

## 2023-05-11 RX ORDER — CYCLOBENZAPRINE HCL 5 MG
5 TABLET ORAL
Qty: 30 TABLET | Refills: 3 | Status: SHIPPED | OUTPATIENT
Start: 2023-05-11 | End: 2023-09-11

## 2023-05-11 RX ORDER — AMLODIPINE BESYLATE 5 MG/1
5 TABLET ORAL DAILY
Qty: 90 TABLET | Refills: 3 | Status: SHIPPED | OUTPATIENT
Start: 2023-05-11 | End: 2024-06-20

## 2023-05-11 ASSESSMENT — PAIN SCALES - GENERAL: PAINLEVEL: NO PAIN (0)

## 2023-05-11 NOTE — PROGRESS NOTES
"  Assessment & Plan     Strain of lumbar region, initial encounter  -Completely resolved.  Reviewed her ER visit note, CT scan, blood work.  Discussed with patient to remain active, daily home stretching and exercise.    Abdominal aortic aneurysm (AAA) without rupture, unspecified part (H)  3.0 cm in size  Maintain blood pressure\" controlled.    Continue with amlodipine.  Ultrasound in 1 year.    Osteoporosis without current pathological fracture, unspecified osteoporosis type    - cyclobenzaprine (FLEXERIL) 5 MG tablet; Take 1 tablet (5 mg) by mouth nightly as needed for muscle spasms    Stage 3b chronic kidney disease (H)  Stable, reviewed labs    Chronic midline low back pain without sciatica    - cyclobenzaprine (FLEXERIL) 5 MG tablet; Take 1 tablet (5 mg) by mouth nightly as needed for muscle spasms    Hypertension, essential    - amLODIPine (NORVASC) 5 MG tablet; Take 1 tablet (5 mg) by mouth daily    Need for COVID-19 vaccine    - COVID-19 BIVALENT 12+ (PFIZER)      Over 32 minutes spent reviewing chart, reviewing test results, talking with and examining patient, formulating plan, and documentation on the day of the encounter.  Reviewing ER visit, discussing labs result and imaging results with patient.    Grace Hurley is a 82 year old, presenting for the following health issues:  ER F/U  Patient was seen in the ER on April 27, 2023 for right flank pain, she had no injury, no trauma, she had no fever no chills.    Her ER work-up was negative.  She had an abdominal CT scan.  In addition, she had patient metabolic bone, UA, hepatic function panel, lipase.  Reviewed his lab result, imaging with the patient.  Currently, she has no pain, she feels back to baseline.  She denies nausea, abdominal pain, no constipation, no blood in urine or stool.        5/11/2023     8:49 AM   Additional Questions   Roomed by Randy Galeana CMA   Accompanied by Self         5/11/2023     8:49 AM   Patient Reported Additional " Medications   Patient reports taking the following new medications No     HPI     ED/UC Followup:    Facility:  [facility]  Date of visit: 04/27/23  Reason for visit: Right-sided pain  Current Status: still experiencing minor pain    Review of Systems   Constitutional, HEENT, cardiovascular, pulmonary, GI, , musculoskeletal, neuro, skin, endocrine and psych systems are negative, except as otherwise noted.      Objective    /62 (BP Location: Right arm, Patient Position: Chair, Cuff Size: Adult Regular)   Pulse 79   Temp 97.9  F (36.6  C) (Tympanic)   Resp 20   Ht 1.524 m (5')   Wt 62.6 kg (138 lb)   SpO2 99%   BMI 26.95 kg/m    Body mass index is 26.95 kg/m .  Physical Exam   GENERAL: healthy, alert and no distress  NECK: no adenopathy, no asymmetry, masses, or scars and thyroid normal to palpation  RESP: lungs clear to auscultation - no rales, rhonchi or wheezes  CV: regular rate and rhythm, normal S1 S2, no S3 or S4, no murmur, click or rub, no peripheral edema and peripheral pulses strong  ABDOMEN: soft, nontender, no hepatosplenomegaly, no masses and bowel sounds normal  MS: no gross musculoskeletal defects noted, no edema  BACK: no CVA tenderness, no paralumbar tenderness  Comprehensive back pain exam:  No tenderness, Range of motion not limited by pain, Lower extremity strength functional and equal on both sides, Lower extremity reflexes within normal limits bilaterally, Lower extremity sensation normal and equal on both sides and Straight leg raise negative bilaterally  PSYCH: mentation appears normal, affect normal/bright    Orders Placed This Encounter   Procedures     REVIEW OF HEALTH MAINTENANCE PROTOCOL ORDERS     COVID-19 BIVALENT 12+ (PFIZER)         Rina Cardenas MD

## 2023-09-01 DIAGNOSIS — M48.02 SPINAL STENOSIS IN CERVICAL REGION: ICD-10-CM

## 2023-09-01 DIAGNOSIS — G89.29 CHRONIC MIDLINE LOW BACK PAIN WITHOUT SCIATICA: ICD-10-CM

## 2023-09-01 DIAGNOSIS — M81.0 OSTEOPOROSIS WITHOUT CURRENT PATHOLOGICAL FRACTURE, UNSPECIFIED OSTEOPOROSIS TYPE: ICD-10-CM

## 2023-09-01 DIAGNOSIS — M47.812 OSTEOARTHRITIS OF CERVICAL SPINE, UNSPECIFIED SPINAL OSTEOARTHRITIS COMPLICATION STATUS: ICD-10-CM

## 2023-09-01 DIAGNOSIS — M54.50 CHRONIC MIDLINE LOW BACK PAIN WITHOUT SCIATICA: ICD-10-CM

## 2023-09-05 RX ORDER — TRAMADOL HYDROCHLORIDE 50 MG/1
TABLET ORAL
Qty: 60 TABLET | Refills: 0 | Status: SHIPPED | OUTPATIENT
Start: 2023-09-05 | End: 2023-09-11

## 2023-09-11 ENCOUNTER — OFFICE VISIT (OUTPATIENT)
Dept: FAMILY MEDICINE | Facility: CLINIC | Age: 83
End: 2023-09-11
Payer: MEDICARE

## 2023-09-11 VITALS
TEMPERATURE: 97.6 F | SYSTOLIC BLOOD PRESSURE: 138 MMHG | BODY MASS INDEX: 27.61 KG/M2 | DIASTOLIC BLOOD PRESSURE: 72 MMHG | OXYGEN SATURATION: 100 % | RESPIRATION RATE: 19 BRPM | WEIGHT: 140.6 LBS | HEIGHT: 60 IN | HEART RATE: 76 BPM

## 2023-09-11 DIAGNOSIS — M47.812 OSTEOARTHRITIS OF CERVICAL SPINE, UNSPECIFIED SPINAL OSTEOARTHRITIS COMPLICATION STATUS: ICD-10-CM

## 2023-09-11 DIAGNOSIS — F11.20 CONTINUOUS OPIOID DEPENDENCE (H): ICD-10-CM

## 2023-09-11 DIAGNOSIS — M81.0 OSTEOPOROSIS WITHOUT CURRENT PATHOLOGICAL FRACTURE, UNSPECIFIED OSTEOPOROSIS TYPE: ICD-10-CM

## 2023-09-11 DIAGNOSIS — Z00.00 ENCOUNTER FOR MEDICARE ANNUAL WELLNESS EXAM: Primary | ICD-10-CM

## 2023-09-11 DIAGNOSIS — E78.2 MIXED HYPERLIPIDEMIA: ICD-10-CM

## 2023-09-11 DIAGNOSIS — M54.50 CHRONIC MIDLINE LOW BACK PAIN WITHOUT SCIATICA: ICD-10-CM

## 2023-09-11 DIAGNOSIS — G89.29 CHRONIC MIDLINE LOW BACK PAIN WITHOUT SCIATICA: ICD-10-CM

## 2023-09-11 DIAGNOSIS — K21.9 GASTROESOPHAGEAL REFLUX DISEASE WITHOUT ESOPHAGITIS: ICD-10-CM

## 2023-09-11 DIAGNOSIS — M67.432 GANGLION CYST OF WRIST, LEFT: ICD-10-CM

## 2023-09-11 DIAGNOSIS — M10.9 GOUT, UNSPECIFIED CAUSE, UNSPECIFIED CHRONICITY, UNSPECIFIED SITE: ICD-10-CM

## 2023-09-11 DIAGNOSIS — E55.9 VITAMIN D DEFICIENCY: ICD-10-CM

## 2023-09-11 DIAGNOSIS — M48.02 SPINAL STENOSIS IN CERVICAL REGION: ICD-10-CM

## 2023-09-11 DIAGNOSIS — E03.9 HYPOTHYROIDISM, UNSPECIFIED TYPE: ICD-10-CM

## 2023-09-11 DIAGNOSIS — N18.32 STAGE 3B CHRONIC KIDNEY DISEASE (H): ICD-10-CM

## 2023-09-11 DIAGNOSIS — M72.2 PLANTAR FASCIITIS: ICD-10-CM

## 2023-09-11 DIAGNOSIS — E53.8 VITAMIN B 12 DEFICIENCY: ICD-10-CM

## 2023-09-11 PROCEDURE — 84443 ASSAY THYROID STIM HORMONE: CPT | Performed by: FAMILY MEDICINE

## 2023-09-11 PROCEDURE — 99213 OFFICE O/P EST LOW 20 MIN: CPT | Mod: 25 | Performed by: FAMILY MEDICINE

## 2023-09-11 PROCEDURE — 82306 VITAMIN D 25 HYDROXY: CPT | Performed by: FAMILY MEDICINE

## 2023-09-11 PROCEDURE — G0438 PPPS, INITIAL VISIT: HCPCS | Performed by: FAMILY MEDICINE

## 2023-09-11 PROCEDURE — 80061 LIPID PANEL: CPT | Performed by: FAMILY MEDICINE

## 2023-09-11 PROCEDURE — 82043 UR ALBUMIN QUANTITATIVE: CPT | Performed by: FAMILY MEDICINE

## 2023-09-11 PROCEDURE — 36415 COLL VENOUS BLD VENIPUNCTURE: CPT | Performed by: FAMILY MEDICINE

## 2023-09-11 PROCEDURE — 80053 COMPREHEN METABOLIC PANEL: CPT | Performed by: FAMILY MEDICINE

## 2023-09-11 PROCEDURE — 82570 ASSAY OF URINE CREATININE: CPT | Performed by: FAMILY MEDICINE

## 2023-09-11 PROCEDURE — 82607 VITAMIN B-12: CPT | Performed by: FAMILY MEDICINE

## 2023-09-11 RX ORDER — CYCLOBENZAPRINE HCL 5 MG
5 TABLET ORAL
Qty: 30 TABLET | Refills: 3 | Status: SHIPPED | OUTPATIENT
Start: 2023-09-11 | End: 2024-01-02

## 2023-09-11 RX ORDER — FAMOTIDINE 40 MG/1
40 TABLET, FILM COATED ORAL
Qty: 90 TABLET | Refills: 3 | Status: SHIPPED | OUTPATIENT
Start: 2023-09-11 | End: 2024-01-25

## 2023-09-11 RX ORDER — TRAMADOL HYDROCHLORIDE 50 MG/1
TABLET ORAL
Qty: 60 TABLET | Refills: 3 | Status: SHIPPED | OUTPATIENT
Start: 2023-10-09 | End: 2024-03-04

## 2023-09-11 ASSESSMENT — ENCOUNTER SYMPTOMS
COUGH: 0
NERVOUS/ANXIOUS: 0
HEMATURIA: 0
EYE PAIN: 0
CHILLS: 0
HEMATOCHEZIA: 0

## 2023-09-11 ASSESSMENT — PATIENT HEALTH QUESTIONNAIRE - PHQ9
SUM OF ALL RESPONSES TO PHQ QUESTIONS 1-9: 3
10. IF YOU CHECKED OFF ANY PROBLEMS, HOW DIFFICULT HAVE THESE PROBLEMS MADE IT FOR YOU TO DO YOUR WORK, TAKE CARE OF THINGS AT HOME, OR GET ALONG WITH OTHER PEOPLE: NOT DIFFICULT AT ALL
SUM OF ALL RESPONSES TO PHQ QUESTIONS 1-9: 3

## 2023-09-11 ASSESSMENT — ANXIETY QUESTIONNAIRES
GAD7 TOTAL SCORE: 0
5. BEING SO RESTLESS THAT IT IS HARD TO SIT STILL: NOT AT ALL
7. FEELING AFRAID AS IF SOMETHING AWFUL MIGHT HAPPEN: NOT AT ALL
GAD7 TOTAL SCORE: 0
IF YOU CHECKED OFF ANY PROBLEMS ON THIS QUESTIONNAIRE, HOW DIFFICULT HAVE THESE PROBLEMS MADE IT FOR YOU TO DO YOUR WORK, TAKE CARE OF THINGS AT HOME, OR GET ALONG WITH OTHER PEOPLE: NOT DIFFICULT AT ALL
1. FEELING NERVOUS, ANXIOUS, OR ON EDGE: NOT AT ALL
3. WORRYING TOO MUCH ABOUT DIFFERENT THINGS: NOT AT ALL
2. NOT BEING ABLE TO STOP OR CONTROL WORRYING: NOT AT ALL
4. TROUBLE RELAXING: NOT AT ALL
6. BECOMING EASILY ANNOYED OR IRRITABLE: NOT AT ALL

## 2023-09-11 ASSESSMENT — ACTIVITIES OF DAILY LIVING (ADL): CURRENT_FUNCTION: NO ASSISTANCE NEEDED

## 2023-09-11 NOTE — PROGRESS NOTES
"SUBJECTIVE:   Mei is a 83 year old who presents for Preventive Visit.  Comes for an annual exam, history of chronic kidney disease stage III has been stable.  History of vitamin B12 deficiency previously she was taking monthly vitamin B12 injection.  History of vitamin D deficiency.  History of chronic neck pain and low back pain, continue to use ultram as needed and Flexeril.   History of hypothyroidism.    Patient has been having a cyst on her left wrist, has been getting larger and more tender.  She has been having pain at the bottom of her right foot, heels area, no injury.  No swelling.        9/11/2023     1:12 PM   Additional Questions   Roomed by Aundrea HERRERA MA   Accompanied by N/A         9/11/2023     1:12 PM   Patient Reported Additional Medications   Patient reports taking the following new medications None       Are you in the first 12 months of your Medicare coverage?  No    Healthy Habits:     In general, how would you rate your overall health?  Good    Frequency of exercise:  2-3 days/week    Duration of exercise:  15-30 minutes    Do you usually eat at least 4 servings of fruit and vegetables a day, include whole grains    & fiber and avoid regularly eating high fat or \"junk\" foods?  Yes    Taking medications regularly:  Yes    Barriers to taking medications:  None    Ability to successfully perform activities of daily living:  No assistance needed    Home Safety:  No safety concerns identified    Hearing Impairment:  No hearing concerns    In the past 6 months, have you been bothered by leaking of urine? Yes    In general, how would you rate your overall mental or emotional health?  Good    Additional concerns today:  No        Have you ever done Advance Care Planning? (For example, a Health Directive, POLST, or a discussion with a medical provider or your loved ones about your wishes): No, advance care planning information given to patient to review.  Patient declined advance care planning " discussion at this time.       Fall risk  Fallen 2 or more times in the past year?: No  Any fall with injury in the past year?: No    Cognitive Screening   1) Repeat 3 items (Leader, Season, Table)    2) Clock draw: NORMAL  3) 3 item recall: Recalls 2 objects   Results: NORMAL clock, 1-2 items recalled: COGNITIVE IMPAIRMENT LESS LIKELY    Mini-CogTM Copyright [employer]. Licensed by the author for use in [facility]; reprinted with permission (charlene@[facility]). All rights reserved.      Do you have sleep apnea, excessive snoring or daytime drowsiness? : no    Reviewed and updated as needed this visit by clinical staff   Tobacco  Allergies  Meds              Reviewed and updated as needed this visit by Provider                 Social History     Tobacco Use    Smoking status: Former     Packs/day: 1.00     Years: 27.00     Pack years: 27.00     Types: Cigarettes     Quit date: 3/28/1986     Years since quittin.4     Passive exposure: Past    Smokeless tobacco: Never   Substance Use Topics    Alcohol use: Yes     Alcohol/week: 0.0 standard drinks of alcohol     Comment: Wine 5-7 glasses per week             2023     1:08 PM   Alcohol Use   Prescreen: >3 drinks/day or >7 drinks/week? No     Do you have a current opioid prescription? No  Do you use any other controlled substances or medications that are not prescribed by a provider? None      Current providers sharing in care for this patient include:   Patient Care Team:  Rina Cardenas MD as PCP - General (Family Medicine)  Rachana Mansfield MD as Assigned Heart and Vascular Provider  Quique Linares MD as Assigned Pulmonology Provider  Patricia Porras MD as MD (Nephrology)  Patricia Porras MD as Assigned Nephrology Provider  Rina Cardenas MD as Assigned PCP    The following health maintenance items are reviewed in Epic and correct as of today:  Health Maintenance   Topic Date Due    DEXA  Never done    ADVANCE CARE PLANNING  Never done     HEPATITIS A IMMUNIZATION (1 of 2 - Risk 2-dose series) Never done    DTAP/TDAP/TD IMMUNIZATION (2 - Td or Tdap) 05/30/2022    MEDICARE ANNUAL WELLNESS VISIT  11/17/2022    MICROALBUMIN  11/17/2022    URINE DRUG SCREEN  05/17/2023    TREATMENT AGREEMENT FOR CHRONIC PAIN MANAGEMENT  05/17/2023    INFLUENZA VACCINE (1) 09/01/2023    HEMOGLOBIN  11/10/2023    BMP  11/10/2023    LIPID  02/08/2024    TSH W/FREE T4 REFLEX  02/08/2024    ANNUAL REVIEW OF HM ORDERS  05/11/2024    GOSIA ASSESSMENT  09/11/2024    FALL RISK ASSESSMENT  09/11/2024    PHQ-9  09/11/2024    PARATHYROID  Completed    PHOSPHORUS  Completed    SPIROMETRY  Completed    COPD ACTION PLAN  Completed    PHQ-2 (once per calendar year)  Completed    Pneumococcal Vaccine: 65+ Years  Completed    URINALYSIS  Completed    ALK PHOS  Completed    ZOSTER IMMUNIZATION  Completed    COVID-19 Vaccine  Completed    IPV IMMUNIZATION  Aged Out    HPV IMMUNIZATION  Aged Out    MENINGITIS IMMUNIZATION  Aged Out     Lab work is in process  Labs reviewed in EPIC  BP Readings from Last 3 Encounters:   09/11/23 138/72   05/11/23 128/62   02/08/23 136/78    Wt Readings from Last 3 Encounters:   09/11/23 63.8 kg (140 lb 9.6 oz)   05/11/23 62.6 kg (138 lb)   02/08/23 64 kg (141 lb)              Pertinent mammograms are reviewed under the imaging tab.    Review of Systems   Constitutional:  Negative for chills.   HENT:  Negative for congestion and ear pain.    Eyes:  Negative for pain.   Respiratory:  Negative for cough.    Gastrointestinal:  Negative for hematochezia.   Genitourinary:  Negative for hematuria.   Psychiatric/Behavioral:  The patient is not nervous/anxious.      Constitutional, HEENT, cardiovascular, pulmonary, GI, , musculoskeletal, neuro, skin, endocrine and psych systems are negative, except as otherwise noted.    OBJECTIVE:   BP (!) 145/72 (BP Location: Right arm, Patient Position: Chair, Cuff Size: Adult Regular)   Pulse 76   Temp 97.6  F (36.4  C) (Oral)    Resp 19   Ht 1.524 m (5')   Wt 63.8 kg (140 lb 9.6 oz)   SpO2 100%   BMI 27.46 kg/m   Estimated body mass index is 27.46 kg/m  as calculated from the following:    Height as of this encounter: 1.524 m (5').    Weight as of this encounter: 63.8 kg (140 lb 9.6 oz).  Physical Exam  GENERAL: healthy, alert and no distress  EYES: Eyes grossly normal to inspection, PERRL and conjunctivae and sclerae normal  HENT: ear canals and TM's normal, nose and mouth without ulcers or lesions  NECK: no adenopathy, no asymmetry, masses, or scars and thyroid normal to palpation  RESP: lungs clear to auscultation - no rales, rhonchi or wheezes  CV: regular rate and rhythm, normal S1 S2, no S3 or S4, no murmur, click or rub, no peripheral edema and peripheral pulses strong  ABDOMEN: soft, nontender, no hepatosplenomegaly, no masses and bowel sounds normal  MS: Left wrist, ganglion cyst, none tender  Tenderness at right bottom , heel foot  No swelling and no injuries.  SKIN: no suspicious lesions or rashes  NEURO: Normal strength and tone, mentation intact and speech normal  PSYCH: mentation appears normal, affect normal/bright    Diagnostic Test Results:  Labs reviewed in Epic  Orders Placed This Encounter   Procedures    Albumin Random Urine Quantitative with Creat Ratio    Comprehensive metabolic panel (BMP + Alb, Alk Phos, ALT, AST, Total. Bili, TP)    TSH with free T4 reflex    Vitamin D Deficiency    Vitamin B12    Lipid panel reflex to direct LDL Fasting        ASSESSMENT / PLAN:   (Z00.00) Encounter for Medicare annual wellness exam  (primary encounter diagnosis)  Comment: normal exam  Plan:  Discussed diet, exercise, wellness and other preventive recommendations related to health maintenance.   Follow up as needed for acute issues.   Fall risks precaution.   Physical exam recommended in one year.       (M81.0) Osteoporosis without current pathological fracture, unspecified osteoporosis type  Comment:   Plan: Comprehensive  metabolic panel (BMP + Alb, Alk         Phos, ALT, AST, Total. Bili, TP), traMADol         (ULTRAM) 50 MG tablet, cyclobenzaprine         (FLEXERIL) 5 MG tablet, CANCELED: DEXA         HIP/PELVIS/SPINE - Future        Continue with vitamin d and calcium supplement.  Last DEXA scan ,04/20/2022    (N18.32) Stage 3b chronic kidney disease (H)  Comment:   Plan: Albumin Random Urine Quantitative with Creat         Ratio        Stable,     (F11.20) Continuous opioid dependence (H)  Comment:   Plan: continue to use as needed    (E03.9) Hypothyroidism, unspecified type  Comment:   Plan: TSH with free T4 reflex            (E55.9) Vitamin D deficiency  Comment:   Plan: Vitamin D Deficiency            (E53.8) Vitamin B 12 deficiency  Comment:   Plan: Vitamin B12            (E78.2) Mixed hyperlipidemia  Comment:   Plan: Lipid panel reflex to direct LDL Fasting            (M10.9) Gout, unspecified cause, unspecified chronicity, unspecified site  Comment:   Plan: stable, no flare and no medications needed.    (K21.9) Gastroesophageal reflux disease without esophagitis  Comment:   Plan: famotidine (PEPCID) 40 MG tablet        Diet modifications  Use as needed    (M47.812) Osteoarthritis of cervical spine, unspecified spinal osteoarthritis complication status  Comment:   Plan: traMADol (ULTRAM) 50 MG tablet            (M54.50,  G89.29) Chronic midline low back pain without sciatica  Comment:   Plan: traMADol (ULTRAM) 50 MG tablet, cyclobenzaprine        (FLEXERIL) 5 MG tablet        Follow up with pain management.    (M48.02) Spinal stenosis in cervical region  Comment:   Plan: traMADol (ULTRAM) 50 MG tablet        Follow up with pain management.    (M67.432) Ganglion cyst of wrist, left  Comment:   Plan: follow up with orthopedics.    (M72.2) Plantar fasciitis  Comment:   Plan: daily stretches and exercises. ( Given handout )  Apply ice  Continue to wear good she's insert.    Patient has been advised of split billing requirements  and indicates understanding: Yes      COUNSELING:  Reviewed preventive health counseling, as reflected in patient instructions       Regular exercise       Healthy diet/nutrition       Fall risk prevention       Immunizations  At later time ( influenza and COVID booster )      BMI:   Estimated body mass index is 27.46 kg/m  as calculated from the following:    Height as of this encounter: 1.524 m (5').    Weight as of this encounter: 63.8 kg (140 lb 9.6 oz).   Weight management plan: Discussed healthy diet and exercise guidelines      She reports that she quit smoking about 37 years ago. Her smoking use included cigarettes. She has a 27.00 pack-year smoking history. She has been exposed to tobacco smoke. She has never used smokeless tobacco.      Appropriate preventive services were discussed with this patient, including applicable screening as appropriate for cardiovascular disease, diabetes, osteopenia/osteoporosis, and glaucoma.  As appropriate for age/gender, discussed screening for colorectal cancer, prostate cancer, breast cancer, and cervical cancer. Checklist reviewing preventive services available has been given to the patient.    Reviewed patients plan of care and provided an AVS. The Basic Care Plan (routine screening as documented in Health Maintenance) for Yvonne meets the Care Plan requirement. This Care Plan has been established and reviewed with the Patient.          Rina Cardenas MD  [facility]    Identified Health Risks:  I have reviewed Opioid Use Disorder and Substance Use Disorder risk factors and made any needed referrals. Answers submitted by the patient for this visit:  Patient Health Questionnaire (Submitted on 9/11/2023)  If you checked off any problems, how difficult have these problems made it for you to do your work, take care of things at home, or get along with other people?: Not difficult at all  PHQ9 TOTAL SCORE: 3  GOSIA-7 (Submitted on 9/11/2023)  GOSIA 7  TOTAL SCORE: 0

## 2023-09-11 NOTE — PATIENT INSTRUCTIONS
Patient Education   Personalized Prevention Plan  You are due for the preventive services outlined below.  Your care team is available to assist you in scheduling these services.  If you have already completed any of these items, please share that information with your care team to update in your medical record.  Health Maintenance Due   Topic Date Due     Osteoporosis Screening  Never done     Discuss Advance Care Planning  Never done     Hepatitis A Vaccine (1 of 2 - Risk 2-dose series) Never done     Diptheria Tetanus Pertussis (DTAP/TDAP/TD) Vaccine (2 - Td or Tdap) 05/30/2022     Annual Wellness Visit  11/17/2022     Kidney Microalbumin Urine Test  11/17/2022     URINE DRUG SCREEN  05/17/2023     TREATMENT AGREEMENT FOR CHRONIC PAIN MANAGEMENT  05/17/2023     Flu Vaccine (1) 09/01/2023     Hemoglobin  11/10/2023

## 2023-09-12 ENCOUNTER — TELEPHONE (OUTPATIENT)
Dept: FAMILY MEDICINE | Facility: CLINIC | Age: 83
End: 2023-09-12
Payer: MEDICARE

## 2023-09-12 DIAGNOSIS — E03.9 HYPOTHYROIDISM, UNSPECIFIED TYPE: ICD-10-CM

## 2023-09-12 DIAGNOSIS — E53.8 VITAMIN B12 DEFICIENCY (NON ANEMIC): ICD-10-CM

## 2023-09-12 LAB
ALBUMIN SERPL BCG-MCNC: 4.3 G/DL (ref 3.5–5.2)
ALP SERPL-CCNC: 114 U/L (ref 35–104)
ALT SERPL W P-5'-P-CCNC: 14 U/L (ref 0–50)
ANION GAP SERPL CALCULATED.3IONS-SCNC: 13 MMOL/L (ref 7–15)
AST SERPL W P-5'-P-CCNC: 28 U/L (ref 0–45)
BILIRUB SERPL-MCNC: 0.3 MG/DL
BUN SERPL-MCNC: 12.9 MG/DL (ref 8–23)
CALCIUM SERPL-MCNC: 10 MG/DL (ref 8.8–10.2)
CHLORIDE SERPL-SCNC: 100 MMOL/L (ref 98–107)
CHOLEST SERPL-MCNC: 210 MG/DL
CREAT SERPL-MCNC: 1.25 MG/DL (ref 0.51–0.95)
CREAT UR-MCNC: 48.3 MG/DL
DEPRECATED CALCIDIOL+CALCIFEROL SERPL-MC: 67 UG/L (ref 20–75)
DEPRECATED HCO3 PLAS-SCNC: 26 MMOL/L (ref 22–29)
EGFRCR SERPLBLD CKD-EPI 2021: 43 ML/MIN/1.73M2
GLUCOSE SERPL-MCNC: 105 MG/DL (ref 70–99)
HDLC SERPL-MCNC: 73 MG/DL
LDLC SERPL CALC-MCNC: 115 MG/DL
MICROALBUMIN UR-MCNC: <12 MG/L
MICROALBUMIN/CREAT UR: NORMAL MG/G{CREAT}
NONHDLC SERPL-MCNC: 137 MG/DL
POTASSIUM SERPL-SCNC: 4.4 MMOL/L (ref 3.4–5.3)
PROT SERPL-MCNC: 7.3 G/DL (ref 6.4–8.3)
SODIUM SERPL-SCNC: 139 MMOL/L (ref 136–145)
TRIGL SERPL-MCNC: 111 MG/DL
TSH SERPL DL<=0.005 MIU/L-ACNC: 0.77 UIU/ML (ref 0.3–4.2)
VIT B12 SERPL-MCNC: 276 PG/ML (ref 232–1245)

## 2023-09-12 RX ORDER — CYANOCOBALAMIN 1000 UG/ML
1 INJECTION, SOLUTION INTRAMUSCULAR; SUBCUTANEOUS
Qty: 1 ML | Refills: 11 | Status: SHIPPED | OUTPATIENT
Start: 2023-09-12

## 2023-09-12 RX ORDER — LEVOTHYROXINE SODIUM 88 UG/1
88 TABLET ORAL DAILY
Qty: 90 TABLET | Refills: 3 | Status: SHIPPED | OUTPATIENT
Start: 2023-09-12 | End: 2024-09-26

## 2023-09-12 NOTE — TELEPHONE ENCOUNTER
RN called and relayed provider message    Patient verbalized understanding and in agreement with plan of care.     Flores Montana RN

## 2023-09-12 NOTE — TELEPHONE ENCOUNTER
"----- Message from Rina Cardenas MD sent at 9/12/2023  8:14 AM CDT -----  Please call pt with results    Vitamin B12 at low to normal side.  Please have patient restart her vitamin B 12 injection once a month.  I did send her prescription to the pharmacy.    Normal for TSH, thyroid function, continue with current thyroid medication.    Kidney function remains stable, she does follow-up with nephrology.    Total cholesterol, LDL at the higher side.    Vitamin D still pending.      Thanks    Ways to improve your cholesterol...     1- Eats less saturated fats (including avoiding \"trans\" fats), fatty foods.  Eat more baked food, than fried food.     2 - Eat more unsaturated fats  - found in vegetables, grains, and tree nuts.  Also by replacing butter with canola oil or olive oil.     3 - Eat more nuts.  1-2 ounces (a small handful) of almonds, walnuts, hazelnuts or pecans once a day in place of other less healthy snacks.     4 - Eat more high fiber foods - vegetables and whole grains including oat bran, oats, beans, peas, and flax seed.     5 - Eat more fish - such as salmon, tuna, mackerel, and sardines.  1 or 2 six ounce servings per week is a healthy replacement for other proteins.     6 - Exercise for at least 120 minutes per week - which is equal to 30 minutes 4 days per week.    7- avoid late meals, at least  a few hours before bedtime.           "

## 2023-11-20 ENCOUNTER — OFFICE VISIT (OUTPATIENT)
Dept: FAMILY MEDICINE | Facility: CLINIC | Age: 83
End: 2023-11-20
Payer: MEDICARE

## 2023-11-20 VITALS
OXYGEN SATURATION: 99 % | HEIGHT: 61 IN | WEIGHT: 141 LBS | DIASTOLIC BLOOD PRESSURE: 68 MMHG | SYSTOLIC BLOOD PRESSURE: 130 MMHG | BODY MASS INDEX: 26.62 KG/M2 | RESPIRATION RATE: 24 BRPM | HEART RATE: 74 BPM | TEMPERATURE: 97.7 F

## 2023-11-20 DIAGNOSIS — M48.02 SPINAL STENOSIS IN CERVICAL REGION: ICD-10-CM

## 2023-11-20 DIAGNOSIS — M10.9 GOUT, UNSPECIFIED CAUSE, UNSPECIFIED CHRONICITY, UNSPECIFIED SITE: ICD-10-CM

## 2023-11-20 DIAGNOSIS — M47.812 SPONDYLOSIS OF CERVICAL REGION WITHOUT MYELOPATHY OR RADICULOPATHY: Primary | ICD-10-CM

## 2023-11-20 DIAGNOSIS — N18.32 STAGE 3B CHRONIC KIDNEY DISEASE (H): ICD-10-CM

## 2023-11-20 DIAGNOSIS — T14.8XXA BRUISE: ICD-10-CM

## 2023-11-20 LAB
ANION GAP SERPL CALCULATED.3IONS-SCNC: 13 MMOL/L (ref 7–15)
APTT PPP: 27 SECONDS (ref 22–38)
BASOPHILS # BLD AUTO: 0 10E3/UL (ref 0–0.2)
BASOPHILS NFR BLD AUTO: 1 %
BUN SERPL-MCNC: 17.2 MG/DL (ref 8–23)
CALCIUM SERPL-MCNC: 9.8 MG/DL (ref 8.8–10.2)
CHLORIDE SERPL-SCNC: 105 MMOL/L (ref 98–107)
CREAT SERPL-MCNC: 1.09 MG/DL (ref 0.51–0.95)
DEPRECATED HCO3 PLAS-SCNC: 23 MMOL/L (ref 22–29)
EGFRCR SERPLBLD CKD-EPI 2021: 50 ML/MIN/1.73M2
EOSINOPHIL # BLD AUTO: 0.3 10E3/UL (ref 0–0.7)
EOSINOPHIL NFR BLD AUTO: 4 %
ERYTHROCYTE [DISTWIDTH] IN BLOOD BY AUTOMATED COUNT: 13.9 % (ref 10–15)
GLUCOSE SERPL-MCNC: 100 MG/DL (ref 70–99)
HCT VFR BLD AUTO: 41.7 % (ref 35–47)
HGB BLD-MCNC: 12.8 G/DL (ref 11.7–15.7)
IMM GRANULOCYTES # BLD: 0 10E3/UL
IMM GRANULOCYTES NFR BLD: 0 %
INR PPP: 1.03 (ref 0.85–1.15)
LYMPHOCYTES # BLD AUTO: 1.4 10E3/UL (ref 0.8–5.3)
LYMPHOCYTES NFR BLD AUTO: 19 %
MCH RBC QN AUTO: 28.5 PG (ref 26.5–33)
MCHC RBC AUTO-ENTMCNC: 30.7 G/DL (ref 31.5–36.5)
MCV RBC AUTO: 93 FL (ref 78–100)
MONOCYTES # BLD AUTO: 1.1 10E3/UL (ref 0–1.3)
MONOCYTES NFR BLD AUTO: 14 %
NEUTROPHILS # BLD AUTO: 4.7 10E3/UL (ref 1.6–8.3)
NEUTROPHILS NFR BLD AUTO: 62 %
PLATELET # BLD AUTO: 287 10E3/UL (ref 150–450)
POTASSIUM SERPL-SCNC: 4.4 MMOL/L (ref 3.4–5.3)
RBC # BLD AUTO: 4.49 10E6/UL (ref 3.8–5.2)
SODIUM SERPL-SCNC: 141 MMOL/L (ref 135–145)
URATE SERPL-MCNC: 6.1 MG/DL (ref 2.4–5.7)
WBC # BLD AUTO: 7.5 10E3/UL (ref 4–11)

## 2023-11-20 PROCEDURE — 99214 OFFICE O/P EST MOD 30 MIN: CPT | Performed by: FAMILY MEDICINE

## 2023-11-20 PROCEDURE — 36415 COLL VENOUS BLD VENIPUNCTURE: CPT | Performed by: FAMILY MEDICINE

## 2023-11-20 PROCEDURE — 85610 PROTHROMBIN TIME: CPT | Performed by: FAMILY MEDICINE

## 2023-11-20 PROCEDURE — 80048 BASIC METABOLIC PNL TOTAL CA: CPT | Performed by: FAMILY MEDICINE

## 2023-11-20 PROCEDURE — 84550 ASSAY OF BLOOD/URIC ACID: CPT | Performed by: FAMILY MEDICINE

## 2023-11-20 PROCEDURE — 85730 THROMBOPLASTIN TIME PARTIAL: CPT | Performed by: FAMILY MEDICINE

## 2023-11-20 PROCEDURE — 85025 COMPLETE CBC W/AUTO DIFF WBC: CPT | Performed by: FAMILY MEDICINE

## 2023-11-20 ASSESSMENT — PAIN SCALES - GENERAL: PAINLEVEL: NO PAIN (0)

## 2023-11-20 NOTE — PROGRESS NOTES
Assessment & Plan     Spondylosis of cervical region without myelopathy or radiculopathy  Spinal stenosis in cervical region  Reviewed had a previous cervical MRI from October of last year, which showed degenerative changes, spinal stenosis, at multiple levels, between moderate to severe.  Continue to have neck pain, decrease range of motion, pain down to left shoulder and arm.  She was seen by [facility] pain clinic she had multiple cortisone injections with no benefit.  In addition , she had multiple physical therapy sessions, up to 3 sessions, no benefit.  She has been scheduled to follow-up with an orthopedic.    Continue with current medications.    Stage 3b chronic kidney disease (H)  BMP today  Stable,    Bruise  No history of bleeding.  - CBC with platelets and differential; Future  - INR; Future  - Partial thromboplastin time; Future  - CBC with platelets and differential  - INR  - Partial thromboplastin time    Gout, unspecified cause, unspecified chronicity, unspecified site    - Uric acid  - Basic metabolic panel  (Ca, Cl, CO2, Creat, Gluc, K, Na, BUN)      Grace Hurley is a 83 year old, presenting for the following health issues:  Dizziness (Head check)  History of chronic cervical radiculopathy, spinal stenosis, degenerative changes.  She has been having more bruising to her upper arm, no bleeding, no history of bleeding, she has no gum bleeding.  She is not taking aspirin.    She continues to have neck stiffness, pain, decreased range of motion.  She has seen [facility] pain clinic she had cortisone injection, she has done multiple physical therapy.        11/20/2023     9:27 AM   Additional Questions   Roomed by Randy GUEVARA CMA   Accompanied by Self         11/20/2023     9:27 AM   Patient Reported Additional Medications   Patient reports taking the following new medications None       History of Present Illness       Reason for visit:  General check answer some questions    She eats 2-3 servings of  "fruits and vegetables daily.She consumes 0 sweetened beverage(s) daily.She exercises with enough effort to increase her heart rate 10 to 19 minutes per day.  She exercises with enough effort to increase her heart rate 3 or less days per week.   She is taking medications regularly.       Review of Systems   Constitutional, HEENT, cardiovascular, pulmonary, GI, , musculoskeletal, neuro, skin, endocrine and psych systems are negative, except as otherwise noted.      Objective    BP (!) 158/67 (BP Location: Right arm, Patient Position: Chair, Cuff Size: Adult Regular)   Pulse 74   Temp 97.7  F (36.5  C) (Oral)   Resp 24   Ht 1.537 m (5' 0.5\")   Wt 64 kg (141 lb)   SpO2 99%   BMI 27.08 kg/m    Body mass index is 27.08 kg/m .  Physical Exam   GENERAL: healthy, alert and no distress    SKIN: upper extremities bruising.  NEURO: Normal strength and tone, mentation intact and speech normal  PSYCH: mentation appears normal, affect normal/bright    Orders Placed This Encounter   Procedures    INR    Partial thromboplastin time    CBC with platelets and differential    CBC with platelets and differential            Rina Cardenas MD            "

## 2023-11-27 ENCOUNTER — TRANSFERRED RECORDS (OUTPATIENT)
Dept: HEALTH INFORMATION MANAGEMENT | Facility: CLINIC | Age: 83
End: 2023-11-27
Payer: MEDICARE

## 2023-12-06 ENCOUNTER — TRANSFERRED RECORDS (OUTPATIENT)
Dept: HEALTH INFORMATION MANAGEMENT | Facility: CLINIC | Age: 83
End: 2023-12-06
Payer: MEDICARE

## 2023-12-11 ENCOUNTER — TRANSFERRED RECORDS (OUTPATIENT)
Dept: HEALTH INFORMATION MANAGEMENT | Facility: CLINIC | Age: 83
End: 2023-12-11
Payer: MEDICARE

## 2024-01-02 DIAGNOSIS — M81.0 OSTEOPOROSIS WITHOUT CURRENT PATHOLOGICAL FRACTURE, UNSPECIFIED OSTEOPOROSIS TYPE: ICD-10-CM

## 2024-01-02 DIAGNOSIS — G89.29 CHRONIC MIDLINE LOW BACK PAIN WITHOUT SCIATICA: ICD-10-CM

## 2024-01-02 DIAGNOSIS — M54.50 CHRONIC MIDLINE LOW BACK PAIN WITHOUT SCIATICA: ICD-10-CM

## 2024-01-02 RX ORDER — CYCLOBENZAPRINE HCL 5 MG
5 TABLET ORAL
Qty: 30 TABLET | Refills: 4 | Status: SHIPPED | OUTPATIENT
Start: 2024-01-02 | End: 2024-02-13

## 2024-01-25 ENCOUNTER — OFFICE VISIT (OUTPATIENT)
Dept: FAMILY MEDICINE | Facility: CLINIC | Age: 84
End: 2024-01-25
Payer: MEDICARE

## 2024-01-25 VITALS
BODY MASS INDEX: 27.48 KG/M2 | OXYGEN SATURATION: 100 % | SYSTOLIC BLOOD PRESSURE: 140 MMHG | WEIGHT: 140 LBS | HEIGHT: 60 IN | DIASTOLIC BLOOD PRESSURE: 78 MMHG | TEMPERATURE: 98 F | RESPIRATION RATE: 16 BRPM | HEART RATE: 75 BPM

## 2024-01-25 DIAGNOSIS — J43.1 PANLOBULAR EMPHYSEMA (H): ICD-10-CM

## 2024-01-25 DIAGNOSIS — M35.3 PMR (POLYMYALGIA RHEUMATICA) (H): ICD-10-CM

## 2024-01-25 DIAGNOSIS — M47.22 OSTEOARTHRITIS OF SPINE WITH RADICULOPATHY, CERVICAL REGION: ICD-10-CM

## 2024-01-25 DIAGNOSIS — K21.9 GASTROESOPHAGEAL REFLUX DISEASE WITHOUT ESOPHAGITIS: ICD-10-CM

## 2024-01-25 DIAGNOSIS — M48.02 SPINAL STENOSIS IN CERVICAL REGION: ICD-10-CM

## 2024-01-25 DIAGNOSIS — N18.32 STAGE 3B CHRONIC KIDNEY DISEASE (H): ICD-10-CM

## 2024-01-25 DIAGNOSIS — I10 BENIGN ESSENTIAL HYPERTENSION: ICD-10-CM

## 2024-01-25 DIAGNOSIS — E03.4 HYPOTHYROIDISM DUE TO ACQUIRED ATROPHY OF THYROID: ICD-10-CM

## 2024-01-25 DIAGNOSIS — I25.10 CORONARY ARTERY CALCIFICATION SEEN ON CT SCAN: ICD-10-CM

## 2024-01-25 DIAGNOSIS — Z01.818 PREOP GENERAL PHYSICAL EXAM: Primary | ICD-10-CM

## 2024-01-25 PROBLEM — F11.20 CONTINUOUS OPIOID DEPENDENCE (H): Status: RESOLVED | Noted: 2023-05-11 | Resolved: 2024-01-25

## 2024-01-25 PROBLEM — I71.40 ABDOMINAL AORTIC ANEURYSM (AAA) (H): Status: RESOLVED | Noted: 2023-05-11 | Resolved: 2024-01-25

## 2024-01-25 LAB
ANION GAP SERPL CALCULATED.3IONS-SCNC: 11 MMOL/L (ref 7–15)
BASOPHILS # BLD AUTO: 0 10E3/UL (ref 0–0.2)
BASOPHILS NFR BLD AUTO: 0 %
BUN SERPL-MCNC: 10.8 MG/DL (ref 8–23)
CALCIUM SERPL-MCNC: 9.8 MG/DL (ref 8.8–10.2)
CHLORIDE SERPL-SCNC: 104 MMOL/L (ref 98–107)
CREAT SERPL-MCNC: 1.16 MG/DL (ref 0.51–0.95)
DEPRECATED HCO3 PLAS-SCNC: 25 MMOL/L (ref 22–29)
EGFRCR SERPLBLD CKD-EPI 2021: 47 ML/MIN/1.73M2
EOSINOPHIL # BLD AUTO: 0.3 10E3/UL (ref 0–0.7)
EOSINOPHIL NFR BLD AUTO: 3 %
ERYTHROCYTE [DISTWIDTH] IN BLOOD BY AUTOMATED COUNT: 13.2 % (ref 10–15)
GLUCOSE SERPL-MCNC: 100 MG/DL (ref 70–99)
HCT VFR BLD AUTO: 43.1 % (ref 35–47)
HGB BLD-MCNC: 13.4 G/DL (ref 11.7–15.7)
IMM GRANULOCYTES # BLD: 0 10E3/UL
IMM GRANULOCYTES NFR BLD: 0 %
LYMPHOCYTES # BLD AUTO: 1.4 10E3/UL (ref 0.8–5.3)
LYMPHOCYTES NFR BLD AUTO: 16 %
MCH RBC QN AUTO: 28.7 PG (ref 26.5–33)
MCHC RBC AUTO-ENTMCNC: 31.1 G/DL (ref 31.5–36.5)
MCV RBC AUTO: 92 FL (ref 78–100)
MONOCYTES # BLD AUTO: 1 10E3/UL (ref 0–1.3)
MONOCYTES NFR BLD AUTO: 11 %
NEUTROPHILS # BLD AUTO: 6.3 10E3/UL (ref 1.6–8.3)
NEUTROPHILS NFR BLD AUTO: 70 %
PLATELET # BLD AUTO: 285 10E3/UL (ref 150–450)
POTASSIUM SERPL-SCNC: 4.3 MMOL/L (ref 3.4–5.3)
RBC # BLD AUTO: 4.67 10E6/UL (ref 3.8–5.2)
SODIUM SERPL-SCNC: 140 MMOL/L (ref 135–145)
WBC # BLD AUTO: 9.1 10E3/UL (ref 4–11)

## 2024-01-25 PROCEDURE — 80048 BASIC METABOLIC PNL TOTAL CA: CPT | Performed by: FAMILY MEDICINE

## 2024-01-25 PROCEDURE — 93000 ELECTROCARDIOGRAM COMPLETE: CPT | Performed by: FAMILY MEDICINE

## 2024-01-25 PROCEDURE — 36415 COLL VENOUS BLD VENIPUNCTURE: CPT | Performed by: FAMILY MEDICINE

## 2024-01-25 PROCEDURE — 85025 COMPLETE CBC W/AUTO DIFF WBC: CPT | Performed by: FAMILY MEDICINE

## 2024-01-25 PROCEDURE — 99214 OFFICE O/P EST MOD 30 MIN: CPT | Mod: 25 | Performed by: FAMILY MEDICINE

## 2024-01-25 RX ORDER — FAMOTIDINE 40 MG/1
40 TABLET, FILM COATED ORAL 2 TIMES DAILY PRN
Qty: 180 TABLET | Refills: 3 | Status: SHIPPED | OUTPATIENT
Start: 2024-01-25

## 2024-01-25 ASSESSMENT — PAIN SCALES - GENERAL: PAINLEVEL: NO PAIN (0)

## 2024-01-25 NOTE — PROGRESS NOTES
Preoperative Evaluation  [address] [address]  Phone: [phone]  Primary Provider: Rina Cardenas  Pre-op Performing Provider: RINA CARDENAS  Jan 25, 2024       Mei is a 83 year old, presenting for the following:  Pre-Op Exam        1/25/2024    12:44 PM   Additional Questions   Roomed by Randy GUEVARA CMA   Accompanied by N/A         1/25/2024    12:44 PM   Patient Reported Additional Medications   Patient reports taking the following new medications None     Surgical Information  Surgery/Procedure: CERVICAL 1 - CERVICAL 2 POSTERIOR INSTRUMENTED FUSION   Surgery Location: [facility]  Surgeon: Dr. Hill  Surgery Date: 02/06/24  Time of Surgery: 11 AM  Where patient plans to recover: At home with family  Fax number for surgical facility: [phone]    Assessment & Plan     The proposed surgical procedure is considered INTERMEDIATE risk.    Preop general physical exam  Nothing to eat to drink after midnight the morning of surgery  No NSAID, one week of surgery.  - EKG 12-lead complete w/read - Clinics    Osteoarthritis of spine with radiculopathy, cervical region  Spinal stenosis in cervical region  Surgery, C 1- C 2, posterior instrumented fusion.  Normal for CBC.    Hypothyroidism due to acquired atrophy of thyroid  Continue with current medications.    Stage 3a chronic kidney disease (H)  Stable,  - CBC with platelets and differential; Future  - Basic metabolic panel  (Ca, Cl, CO2, Creat, Gluc, K, Na, BUN); Future  - CBC with platelets and differential  - Basic metabolic panel  (Ca, Cl, CO2, Creat, Gluc, K, Na, BUN)    PMR (polymyalgia rheumatica) (H24)  In remission, with no symptoms.    Panlobular emphysema (H)  Stable, no symptoms.    Gastroesophageal reflux disease without esophagitis  Diet modifications, continue with current medication.  - famotidine (PEPCID) 40 MG tablet; Take 1 tablet (40 mg) by mouth 2 times daily as  needed for heartburn    Benign essential hypertension  Stable, continue with current medications  Low sat diet .    Coronary artery calcification seen on CT scan  Patient has no symptoms, no chest pain, no short of breath.  She is physically very active.  Previously, she had a 2D echo from April 2021, was normal with ejection fraction 60 to 65%.  In addition, she had heart catheterization in May 2021 which showed moderate coronary artery disease,  Recommendation was medically management in which she was taking aspirin and statin.  Otherwise, she has no recurrent pain, no short of breath, no chest pain  EKG from today, shows, Sinus Rhythm -occasional ectopic ventricular beat    Low voltage in limb leads. Similar to EKG from 05/2021, no changes.        Risks and Recommendations  The patient has the following additional risks and recommendations for perioperative complications:   - No identified additional risk factors other than previously addressed    Antiplatelet or Anticoagulation Medication Instructions   - Patient is on no antiplatelet or anticoagulation medications.    Additional Medication Instructions  Patient is to take all scheduled medications on the day of surgery EXCEPT for modifications listed below:    Recommendation  APPROVAL GIVEN to proceed with proposed procedure, without further diagnostic evaluation.    Subjective       HPI related to upcoming procedure:   Patient with history of severe spinal stenosis, cervical spine, degenerative changes, she is scheduled to have C1, C2 spinal fusion.  She has no chest pain, no short of breath, denies lower extremity edema.  Physically,  she is very active.  Previous cardiac evaluation was normal.  Patient has no symptoms, no chest pain, no short of breath.  She is physically very active.  Previously, she had a 2D echo from April 2021, was normal with ejection fraction 60 to 65%.  In addition, she had heart catheterization in May 2021 which showed moderate  coronary artery disease,  Recommendation was medically management in which she was taking aspirin and statin.  Otherwise she has no recurrent pain, no short of breath, no chest pain  EKG from today, shows, Sinus Rhythm -occasional ectopic ventricular beat    Low voltage in limb leads. Similar to EKG from 05/2021, no changes.          1/25/2024    12:29 PM   Preop Questions   1. Have you ever had a heart attack or stroke? No   2. Have you ever had surgery on your heart or blood vessels, such as a stent placement, a coronary artery bypass, or surgery on an artery in your head, neck, heart, or legs? No   3. Do you have chest pain with activity? No   4. Do you have a history of  heart failure? No   5. Do you currently have a cold, bronchitis or symptoms of other infection? No   6. Do you have a cough, shortness of breath, or wheezing? No   7. Do you or anyone in your family have previous history of blood clots? No   8. Do you or does anyone in your family have a serious bleeding problem such as prolonged bleeding following surgeries or cuts? No   9. Have you ever had problems with anemia or been told to take iron pills? No   10. Have you had any abnormal blood loss such as black, tarry or bloody stools, or abnormal vaginal bleeding? No   11. Have you ever had a blood transfusion? No   12. Are you willing to have a blood transfusion if it is medically needed before, during, or after your surgery? Yes   13. Have you or any of your relatives ever had problems with anesthesia? UNKNOWN - Nausea.   14. Do you have sleep apnea, excessive snoring or daytime drowsiness? No.   15. Do you have any artifical heart valves or other implanted medical devices like a pacemaker, defibrillator, or continuous glucose monitor? No   16. Do you have artificial joints? No   17. Are you allergic to latex? No       Health Care Directive  Patient does not have a Health Care Directive or Living Will: Discussed advance care planning with patient;  information given to patient to review.    Preoperative Review of    reviewed - controlled substances reflected in medication list.      Status of Chronic Conditions:  HYPERTENSION - Patient has longstanding history of HTN , currently denies any symptoms referable to elevated blood pressure. Specifically denies chest pain, palpitations, dyspnea, orthopnea, PND or peripheral edema. Blood pressure readings have been in normal range. Current medication regimen is as listed below. Patient denies any side effects of medication.     HYPOTHYROIDISM - Patient has a longstanding history of chronic Hypothyroidism. Patient has been doing well, noting no tremor, insomnia, hair loss or changes in skin texture. Continues to take medications as directed, without adverse reactions or side effects. Last TSH   Lab Results   Component Value Date    TSH 0.77 09/11/2023   .      Patient Active Problem List    Diagnosis Date Noted    Ganglion cyst of wrist, left 09/11/2023     Priority: Medium    Abdominal aortic aneurysm (AAA) (H24) 05/11/2023     Priority: Medium    F11.2 - Continuous opioid dependence (H) 05/11/2023     Priority: Medium    Spinal stenosis in cervical region 12/19/2022     Priority: Medium    Stage 3b chronic kidney disease (H) 05/19/2022     Priority: Medium    Osteoporosis without current pathological fracture, unspecified osteoporosis type 04/29/2022     Priority: Medium    Status post coronary angiogram 05/28/2021     Priority: Medium    Chest discomfort 05/13/2021     Priority: Medium     Added automatically from request for surgery [id number]      Coronary artery calcification seen on CT scan 05/13/2021     Priority: Medium     Added automatically from request for surgery [id number]      Shoulder impingement, right 05/02/2018     Priority: Medium    Lumbar spondylosis 09/21/2017     Priority: Medium    Primary osteoarthritis of both hands 09/21/2017     Priority: Medium    Coronary artery disease involving native  coronary artery of native heart without angina pectoris 08/15/2017     Priority: Medium    Bilateral carpal tunnel syndrome 08/10/2017     Priority: Medium    Chronic midline low back pain without sciatica 08/10/2017     Priority: Medium    Spondylosis of cervical region without myelopathy or radiculopathy 08/10/2017     Priority: Medium    Chronic neck pain 08/10/2017     Priority: Medium    PMR (polymyalgia rheumatica) (H24) 08/10/2017     Priority: Medium     Last Assessment & Plan:   Formatting of this note might be different from the original.  Has PMR and is on prednisone      Hypothyroidism 03/28/2016     Priority: Medium    Benign essential hypertension 03/28/2016     Priority: Medium    Panlobular emphysema (H) 03/28/2016     Priority: Medium     Follow up with pulmonary in [facility] [place] 2], she is on 2 L of oxygen prn      Pulmonary nodules 03/28/2016     Priority: Medium     Follow up with pulmonary at [facility] [place][note 2],. Small 5.4 mm lung nodule seen on HRCT in [place] - f/u CT today shows stable nodule - 6 month f/u will be done for total of 2 yrs as standard follow up in June 2016        Fibromyalgia 03/28/2016     Priority: Medium    Other nonspecific abnormal finding of lung field 03/28/2016     Priority: Medium     Formatting of this note might be different from the original.  Overview:   Follow up with pulmonary at [facility] [place][note 2],. Small 5.4 mm [facility] f/u CT today shows stable nodule - 6 month f/u will be done for total of 2 yrs as standard follow up in June 2016      Hearing loss, right 01/27/2016     Priority: Medium    Hypothyroidism due to acquired atrophy of thyroid 01/08/2015     Priority: Medium    Rectocele 08/25/2014     Priority: Medium    Shingles (herpes zoster) polyneuropathy 05/14/2014     Priority: Medium    H/O: hysterectomy 05/30/2012     Priority: Medium    Diverticulosis 05/16/2011     Priority: Medium     Formatting of this note might be different from the  original.  Celiac Sprue  Formatting of this note might be different from the original.  dx biopsy [facility] -6-06      Elevated C-reactive protein (CRP) 02/11/2011     Priority: Medium     Formatting of this note might be different from the original.  C Reactive Protein Abnormal      Generalized osteoarthrosis, involving multiple sites 09/22/2010     Priority: Medium    Myalgia and myositis 09/22/2010     Priority: Medium    Other vitamin B12 deficiency anemia 09/22/2010     Priority: Medium    Gout 10/02/2006     Priority: Medium    Edema 12/20/2005     Priority: Medium     Formatting of this note might be different from the original.  LW Modifier:  mild  LW Onset:  17Clh48  ; Edema Legs      Malaise and fatigue 12/20/2005     Priority: Medium     Formatting of this note might be different from the original.  LW Onset:  05Iwp27  ; Fatigue      Irritable bowel syndrome 03/18/2004     Priority: Medium    Muscle pain 03/18/2004     Priority: Medium     Formatting of this note might be different from the original.  LW Onset:  88Ijv25  ; Myofascial Pain Syndrome  Formatting of this note might be different from the original.  fibromyalgia    Epic ; Myalgia and myositis      Pure hypercholesterolemia 03/18/2004     Priority: Medium      Past Medical History:   Diagnosis Date    Abdominal aortic aneurysm (AAA) (H24)     Arthritis     osteo    Cancer (H)     skin cancer    COPD (chronic obstructive pulmonary disease) (H)     Heart disease     Hypertension     PONV (postoperative nausea and vomiting)     Spinal stenosis in cervical region 12/19/2022    Thyroid disease      Past Surgical History:   Procedure Laterality Date    APPENDECTOMY      BACK SURGERY      CHOLECYSTECTOMY      CV CORONARY ANGIOGRAM N/A 5/28/2021    Procedure: CV CORONARY ANGIOGRAM;  Surgeon: Moses Styles MD;  Location:  [facility]    ENT SURGERY      tonsils    EYE SURGERY      cataracts    GI SURGERY      hiatel hernia repair      Current Outpatient Medications   Medication Sig Dispense Refill    amLODIPine (NORVASC) 5 MG tablet Take 1 tablet (5 mg) by mouth daily 90 tablet 3    Budeson-Glycopyrrol-Formoterol (BREZTRI AEROSPHERE) 160-9-4.8 MCG/ACT AERO       cyanocobalamin (CYANOCOBALAMIN) 1000 MCG/ML injection Inject 1 mL (1,000 mcg) into the muscle every 30 days 1 mL 11    cyclobenzaprine (FLEXERIL) 5 MG tablet Take 1 tablet (5 mg) by mouth nightly as needed for muscle spasms 30 tablet 4    famotidine (PEPCID) 40 MG tablet Take 1 tablet (40 mg) by mouth nightly as needed for heartburn 90 tablet 3    Humidifier MISC 1 Device daily 1 each 0    hydrocortisone valerate (WEST-YVROSE) 0.2 % ointment Apply topically as needed      levothyroxine (SYNTHROID/LEVOTHROID) 88 MCG tablet Take 1 tablet (88 mcg) by mouth daily 90 tablet 3    PREVIDENT 5000 BOOSTER PLUS 1.1 % PSTE       traMADol (ULTRAM) 50 MG tablet TAKE ONE TABLET BY MOUTH TWICE DAILY AS NEEDED 60 tablet 3       Allergies   Allergen Reactions    Gluten Meal Diarrhea     Celiac disease    Other Reaction(s): celiac disease    Pregabalin Swelling     Other reaction(s): Edema    Swelling legs and hands    Other Reaction(s): Feet Swelling    Other Reaction(s): swelling, hands and legs, Unknown    Tetracyclines & Related      Other reaction(s): *Unknown, Dermatitis  Other reaction(s): rash  PN: LW Reaction: rash  questionable allergy  questionable allergy      Wheat Bran     Allopurinol Itching     Other Reaction(s): itching        Social History     Tobacco Use    Smoking status: Former     Packs/day: 1.00     Years: 27.00     Additional pack years: 0.00     Total pack years: 27.00     Types: Cigarettes     Quit date: 3/28/1986     Years since quittin.8     Passive exposure: Past    Smokeless tobacco: Never   Substance Use Topics    Alcohol use: Yes     Alcohol/week: 0.0 standard drinks of alcohol     Comment: Wine 5-7 glasses per week     Family History   Problem Relation Age of Onset  "   Myocardial Infarction Father     Heart Disease Maternal Grandmother     Lymphoma Maternal Grandfather     Heart Disease Paternal Grandmother     Myocardial Infarction Son      History   Drug Use No         Review of Systems    Review of Systems  Constitutional, HEENT, cardiovascular, pulmonary, GI, , musculoskeletal, neuro, skin, endocrine and psych systems are negative, except as otherwise noted.  Objective    BP (!) 156/80 (BP Location: Right arm, Patient Position: Chair, Cuff Size: Adult Regular)   Pulse 75   Temp 98  F (36.7  C) (Oral)   Resp 16   Ht 1.535 m (5' 0.43\")   Wt 63.5 kg (140 lb)   SpO2 100%   BMI 26.95 kg/m     Estimated body mass index is 26.95 kg/m  as calculated from the following:    Height as of this encounter: 1.535 m (5' 0.43\").    Weight as of this encounter: 63.5 kg (140 lb).  Physical Exam  GENERAL: alert and no distress  EYES: Eyes grossly normal to inspection, PERRL and conjunctivae and sclerae normal  HENT: ear canals and TM's normal, nose and mouth without ulcers or lesions  NECK: no adenopathy, no asymmetry, masses, or scars  RESP: lungs clear to auscultation - no rales, rhonchi or wheezes  CV: regular rate and rhythm, normal S1 S2, no S3 or S4, no murmur, click or rub, no peripheral edema  ABDOMEN: soft, nontender, no hepatosplenomegaly, no masses and bowel sounds normal  MS: no gross musculoskeletal defects noted, no edema  SKIN: no suspicious lesions or rashes  NEURO: Normal strength and tone, mentation intact and speech normal  PSYCH: mentation appears normal, affect normal/bright  LYMPH: no cervical, supraclavicular, axillary, or inguinal adenopathy    Recent Labs   Lab Test 11/20/23  1019 09/11/23  1411 11/10/22  1135   HGB 12.8  --  12.6     --  357   INR 1.03  --   --     139 140   POTASSIUM 4.4 4.4 4.1   CR 1.09* 1.25* 1.12*        Diagnostics  CBC RESULTS:   Recent Labs   Lab Test 01/25/24  1341   WBC 9.1   RBC 4.67   HGB 13.4   HCT 43.1   MCV 92 "   MCH 28.7   MCHC 31.1*   RDW 13.2         Orders Placed This Encounter   Procedures    Basic metabolic panel  (Ca, Cl, CO2, Creat, Gluc, K, Na, BUN)    CBC with platelets and differential    EKG 12-lead complete w/read - Clinics    CBC with platelets and differential       EKG from Today:  Sinus Rhythm -occasional ectopic ventricular beat    Low voltage in limb leads  Similar to EKG from May, 2021    Revised Cardiac Risk Index (RCRI)  The patient has the following serious cardiovascular risks for perioperative complications:   - No serious cardiac risks = 0 points     RCRI Interpretation: 0 points: Class I (very low risk - 0.4% complication rate)         Signed Electronically by: Rina Cardenas MD  Copy of this evaluation report is provided to requesting physician.

## 2024-01-25 NOTE — PATIENT INSTRUCTIONS
Preparing for Your Surgery  Getting started  A nurse will call you to review your health history and instructions. They will give you an arrival time based on your scheduled surgery time. Please be ready to share:  Your doctor's clinic name and phone number  Your medical, surgical, and anesthesia history  A list of allergies and sensitivities  A list of medicines, including herbal treatments and over-the-counter drugs  Whether the patient has a legal guardian (ask how to send us the papers in advance)  Please tell us if you're pregnant--or if there's any chance you might be pregnant. Some surgeries may injure a fetus (unborn baby), so they require a pregnancy test. Surgeries that are safe for a fetus don't always need a test, and you can choose whether to have one.   If you have a child who's having surgery, please ask for a copy of Preparing for Your Child's Surgery.    Preparing for surgery  Within 10 to 30 days of surgery: Have a pre-op exam (sometimes called an H&P, or History and Physical). This can be done at a clinic or pre-operative center.  If you're having a , you may not need this exam. Talk to your care team.  At your pre-op exam, talk to your care team about all medicines you take. If you need to stop any medicines before surgery, ask when to start taking them again.  We do this for your safety. Many medicines can make you bleed too much during surgery. Some change how well surgery (anesthesia) drugs work.  Call your insurance company to let them know you're having surgery. (If you don't have insurance, call [phone].)  Call your clinic if there's any change in your health. This includes signs of a cold or flu (sore throat, runny nose, cough, rash, fever). It also includes a scrape or scratch near the surgery site.  If you have questions on the day of surgery, call your hospital or surgery center.  Eating and drinking guidelines  For your safety: Unless your surgeon tells you otherwise,  follow the guidelines below.  Eat and drink as usual until 8 hours before you arrive for surgery. After that, no food or milk.  Drink clear liquids until 2 hours before you arrive. These are liquids you can see through, like water, Gatorade, and Propel Water. They also include plain black coffee and tea (no cream or milk), candy, and breath mints. You can spit out gum when you arrive.  If you drink alcohol: Stop drinking it the night before surgery.  If your care team tells you to take medicine on the morning of surgery, it's okay to take it with a sip of water.  Preventing infection  Shower or bathe the night before and morning of your surgery. Follow the instructions your clinic gave you. (If no instructions, use regular soap.)  Don't shave or clip hair near your surgery site. We'll remove the hair if needed.  Don't smoke or vape the morning of surgery. You may chew nicotine gum up to 2 hours before surgery. A nicotine patch is okay.  Note: Some surgeries require you to completely quit smoking and nicotine. Check with your surgeon.  Your care team will make every effort to keep you safe from infection. We will:  Clean our hands often with soap and water (or an alcohol-based hand rub).  Clean the skin at your surgery site with a special soap that kills germs.  Give you a special gown to keep you warm. (Cold raises the risk of infection.)  Wear special hair covers, masks, gowns and gloves during surgery.  Give antibiotic medicine, if prescribed. Not all surgeries need antibiotics.  What to bring on the day of surgery  Photo ID and insurance card  Copy of your health care directive, if you have one  Glasses and hearing aids (bring cases)  You can't wear contacts during surgery  Inhaler and eye drops, if you use them (tell us about these when you arrive)  CPAP machine or breathing device, if you use them  A few personal items, if spending the night  If you have . . .  A pacemaker, ICD (cardiac defibrillator) or other  implant: Bring the ID card.  An implanted stimulator: Bring the remote control.  A legal guardian: Bring a copy of the certified (court-stamped) guardianship papers.  Please remove any jewelry, including body piercings. Leave jewelry and other valuables at home.  If you're going home the day of surgery  You must have a responsible adult drive you home. They should stay with you overnight as well.  If you don't have someone to stay with you, and you aren't safe to go home alone, we may keep you overnight. Insurance often won't pay for this.  After surgery  If it's hard to control your pain or you need more pain medicine, please call your surgeon's office.  Questions?   If you have any questions for your care team, list them here: _________________________________________________________________________________________________________________________________________________________________________ ____________________________________ ____________________________________ ____________________________________  For informational purposes only. Not to replace the advice of your health care provider. Copyright   2003, 2019 [employer] [facility]. All rights reserved. Clinically reviewed by Brielle Reaves MD. SMARTworks 853036 - REV 12/22.    How to Take Your Medication Before Surgery  - Take all of your medications before surgery except as noted below

## 2024-01-30 DIAGNOSIS — J43.9 PULMONARY EMPHYSEMA, UNSPECIFIED EMPHYSEMA TYPE (H): Primary | ICD-10-CM

## 2024-01-30 RX ORDER — BUDESONIDE, GLYCOPYRROLATE, AND FORMOTEROL FUMARATE 160; 9; 4.8 UG/1; UG/1; UG/1
2 AEROSOL, METERED RESPIRATORY (INHALATION) 2 TIMES DAILY
Qty: 32.1 G | Refills: 0 | Status: SHIPPED | OUTPATIENT
Start: 2024-01-30 | End: 2024-03-29

## 2024-01-30 NOTE — TELEPHONE ENCOUNTER
Budeson-Glycopyrrol-Formoterol (BREZTRI AEROSPHERE) 160-9-4.8 MCG/ACT AERO -- -- -- 07/09/2021 --     Last Office Visit: 01/11/2023  Future Office visit:  Patient due for follow up appointment    Renay Francisco LPN  Pulmonary Medicine:  [facility]  Phone: [phone] Fax: [phone]

## 2024-02-02 ENCOUNTER — TRANSFERRED RECORDS (OUTPATIENT)
Dept: HEALTH INFORMATION MANAGEMENT | Facility: CLINIC | Age: 84
End: 2024-02-02
Payer: MEDICARE

## 2024-02-13 ENCOUNTER — OFFICE VISIT (OUTPATIENT)
Dept: FAMILY MEDICINE | Facility: CLINIC | Age: 84
End: 2024-02-13
Payer: MEDICARE

## 2024-02-13 VITALS
SYSTOLIC BLOOD PRESSURE: 144 MMHG | HEIGHT: 60 IN | TEMPERATURE: 98.6 F | BODY MASS INDEX: 26.7 KG/M2 | RESPIRATION RATE: 18 BRPM | DIASTOLIC BLOOD PRESSURE: 79 MMHG | HEART RATE: 86 BPM | WEIGHT: 136 LBS | OXYGEN SATURATION: 97 %

## 2024-02-13 DIAGNOSIS — K59.01 SLOW TRANSIT CONSTIPATION: ICD-10-CM

## 2024-02-13 DIAGNOSIS — Z98.1 S/P CERVICAL SPINAL FUSION: ICD-10-CM

## 2024-02-13 DIAGNOSIS — M48.02 SPINAL STENOSIS IN CERVICAL REGION: ICD-10-CM

## 2024-02-13 DIAGNOSIS — Z09 HOSPITAL DISCHARGE FOLLOW-UP: Primary | ICD-10-CM

## 2024-02-13 PROCEDURE — 99213 OFFICE O/P EST LOW 20 MIN: CPT | Performed by: FAMILY MEDICINE

## 2024-02-13 RX ORDER — OXYCODONE HYDROCHLORIDE 5 MG/1
TABLET ORAL
Qty: 30 TABLET | Refills: 0 | Status: SHIPPED | OUTPATIENT
Start: 2024-02-13 | End: 2024-04-22

## 2024-02-13 RX ORDER — METHOCARBAMOL 500 MG/1
500 TABLET, FILM COATED ORAL 3 TIMES DAILY PRN
Qty: 90 TABLET | Refills: 0 | Status: SHIPPED | OUTPATIENT
Start: 2024-02-13 | End: 2024-07-09

## 2024-02-13 RX ORDER — OXYCODONE HYDROCHLORIDE 5 MG/1
5-10 TABLET ORAL
COMMUNITY
Start: 2024-02-08 | End: 2024-02-13

## 2024-02-13 RX ORDER — AMOXICILLIN 250 MG
2 CAPSULE ORAL 2 TIMES DAILY PRN
Qty: 180 TABLET | Refills: 3 | Status: SHIPPED | OUTPATIENT
Start: 2024-02-13 | End: 2024-07-09

## 2024-02-13 RX ORDER — METHOCARBAMOL 500 MG/1
500 TABLET, FILM COATED ORAL EVERY 6 HOURS
COMMUNITY
Start: 2024-02-08 | End: 2024-02-13

## 2024-02-13 RX ORDER — ACETAMINOPHEN 500 MG
1000 TABLET ORAL EVERY 6 HOURS
COMMUNITY
Start: 2024-02-07

## 2024-02-13 RX ORDER — AMOXICILLIN 250 MG
1-3 CAPSULE ORAL 2 TIMES DAILY PRN
COMMUNITY
Start: 2024-02-07 | End: 2024-02-13

## 2024-02-13 ASSESSMENT — PAIN SCALES - GENERAL: PAINLEVEL: SEVERE PAIN (6)

## 2024-02-13 NOTE — PROGRESS NOTES
Assessment & Plan     Hospital discharge follow-up  Spinal stenosis in cervical region  S/P cervical spinal fusion  - oxyCODONE (ROXICODONE) 5 MG tablet; 1 tab every 4- 6  hours as needed for sever pain, S/P neck surgery,  - methocarbamol (ROBAXIN) 500 MG tablet; Take 1 tablet (500 mg) by mouth 3 times daily as needed for muscle spasms    Patient did well with her surgery.  She was discharged from the hospital February 8, 2024.  She continued to have pain and stiffness to her neck.  She has no upper extremity weakness or numbness.  I did renew her oxycodone, she will continue with Tylenol, methocarbamol.    She will follow-up with her spinal surgeon.  She will continue with home physical therapy    Slow transit constipation  Increase fiber intake and fluid intake  Take 2 tab bid as needed, while taken Oxycodone.  - senna-docusate (SENOKOT-S/PERICOLACE) 8.6-50 MG tablet; Take 2 tablets by mouth 2 times daily as needed (as needed)       [place] Prescription Monitoring Program, ( ) referenced. No indication of misuse, or abuse.    Grace Hurley is a 83 year old, presenting for the following health issues:    Patient with history of cervical spinal stenosis, spondylolithiasis, status post, cervical spinal fusion.  She was discharged from the hospital on February 8, 2024.  She continues to have pain, stiffness in the neck.  She has no upper extremity weakness, no numbness in her fingers.  She has no headache.    History of constipation, since start taken Oxycodone.   2/13/2024     9:56 AM   Additional Questions    Randy GUEVARA CMA    Son         2/13/2024     9:56 AM   Patient Reported Additional Medications   Patient reports taking the following new medications Medications updated in chart       Hospital Follow-up Visit:    Hospital/Nursing Home/IP Rehab Facility:  [facility]  Date of Admission: 02/06/24  Date of Discharge: 02/08/24  Reason(s) for Admission: Cervical Spinal fusion    Was your  hospitalization related to COVID-19? No   Problems taking medications regularly:  None  Medication changes since discharge: None  Problems adhering to non-medication therapy:  None    Summary of hospitalization:  CareEverywhere information obtained and reviewed  Diagnostic Tests/Treatments reviewed.  Follow up needed: Neurosurgery, PT  Other Healthcare Providers Involved in Patient s Care:         Physical Therapy  Update since discharge: improved.         Plan of care communicated with patient           Review of Systems  Constitutional, HEENT, cardiovascular, pulmonary, GI, , musculoskeletal, neuro, skin, endocrine and psych systems are negative, except as otherwise noted.      Objective    BP (!) 144/79 (BP Location: Right arm, Patient Position: Chair, Cuff Size: Adult Regular)   Pulse 86   Temp 98.6  F (37  C) (Oral)   Resp 18   Ht 1.524 m (5')   Wt 61.7 kg (136 lb)   SpO2 97%   BMI 26.56 kg/m    Body mass index is 26.56 kg/m .  Physical Exam   GENERAL: alert and no distress  Neck Collar in place  RESP: lungs clear to auscultation - no rales, rhonchi or wheezes  CV: regular rate and rhythm, normal S1 S2, no S3 or S4, no murmur, click or rub, no peripheral edema  ABDOMEN: soft, nontender, no hepatosplenomegaly, no masses and bowel sounds normal  MS: neck exam in neck collar.  Upper extremity exam full range of motion, full strength, no weakness  PSYCH: mentation appears normal, affect normal/bright    No orders of the defined types were placed in this encounter.           Signed Electronically by: Rina Cardenas MD

## 2024-02-14 ENCOUNTER — TELEPHONE (OUTPATIENT)
Dept: FAMILY MEDICINE | Facility: CLINIC | Age: 84
End: 2024-02-14
Payer: MEDICARE

## 2024-02-14 NOTE — TELEPHONE ENCOUNTER
Home Care is calling regarding an established patient with [facility].       Requesting orders from: Rina Cardenas  Provider is following patient: Yes  Is this a 60-day recertification request?  No    Orders Requested    Physical Therapy  Request for initial certification (first set of orders)   Frequency:  1x/wk for 3 wks    Occupational Therapy  Request for initial evaluation and treatment (one time)     Confirmed ok to leave a detailed message with call back.  Contact information confirmed and updated as needed.    Kecia Juan RN

## 2024-02-14 NOTE — TELEPHONE ENCOUNTER
Called and left detailed message for Bia with [facility]        Antonella RN    Triage Nurse  [facility]

## 2024-02-15 ENCOUNTER — TELEPHONE (OUTPATIENT)
Dept: FAMILY MEDICINE | Facility: CLINIC | Age: 84
End: 2024-02-15

## 2024-02-15 NOTE — TELEPHONE ENCOUNTER
Home Care is calling regarding an established patient with [facility].       Requesting orders from: Rina Cardenas  Provider is following patient: Yes  Is this a 60-day recertification request?  No    Orders Requested    Occupational Therapy  Request for initial certification (first set of orders)   Frequency:  1x/wk for 3 wks  ADL and home exercise program      Confirmed ok to leave a detailed message with call back.  Contact information confirmed and updated as needed.        Antonella Glass RN

## 2024-02-15 NOTE — TELEPHONE ENCOUNTER
RN left detailed VM to home care Gisela with provider's verbal orders.    RN left call back number for further questions    Megan Tang RN

## 2024-02-16 ENCOUNTER — TELEPHONE (OUTPATIENT)
Dept: FAMILY MEDICINE | Facility: CLINIC | Age: 84
End: 2024-02-16
Payer: MEDICARE

## 2024-02-16 NOTE — TELEPHONE ENCOUNTER
Forms received from [facility]-[id number],28,29,30,31 and [id number] for   Forms placed in provider 'sign me' folder.  Please fax forms to [phone] after completion.       Sarita Drake    [facility]

## 2024-02-21 ENCOUNTER — MEDICAL CORRESPONDENCE (OUTPATIENT)
Dept: HEALTH INFORMATION MANAGEMENT | Facility: CLINIC | Age: 84
End: 2024-02-21
Payer: MEDICARE

## 2024-02-21 ENCOUNTER — TELEPHONE (OUTPATIENT)
Dept: FAMILY MEDICINE | Facility: CLINIC | Age: 84
End: 2024-02-21
Payer: MEDICARE

## 2024-02-21 NOTE — TELEPHONE ENCOUNTER
Forms received from [facility] and Plan of care order-[id number] Cert:02/14/2024-04/13/2024 for   Forms placed in provider 'sign me' folder.  Please fax forms to [phone] after completion.

## 2024-03-04 DIAGNOSIS — G89.29 CHRONIC MIDLINE LOW BACK PAIN WITHOUT SCIATICA: ICD-10-CM

## 2024-03-04 DIAGNOSIS — M81.0 OSTEOPOROSIS WITHOUT CURRENT PATHOLOGICAL FRACTURE, UNSPECIFIED OSTEOPOROSIS TYPE: ICD-10-CM

## 2024-03-04 DIAGNOSIS — M47.812 OSTEOARTHRITIS OF CERVICAL SPINE, UNSPECIFIED SPINAL OSTEOARTHRITIS COMPLICATION STATUS: ICD-10-CM

## 2024-03-04 DIAGNOSIS — M48.02 SPINAL STENOSIS IN CERVICAL REGION: ICD-10-CM

## 2024-03-04 DIAGNOSIS — M54.50 CHRONIC MIDLINE LOW BACK PAIN WITHOUT SCIATICA: ICD-10-CM

## 2024-03-04 RX ORDER — TRAMADOL HYDROCHLORIDE 50 MG/1
TABLET ORAL
Qty: 60 TABLET | Refills: 3 | Status: SHIPPED | OUTPATIENT
Start: 2024-03-04 | End: 2024-07-09

## 2024-03-04 NOTE — TELEPHONE ENCOUNTER
traMADol (ULTRAM) 50 MG tablet       Last Written Prescription Date:  10/9/2023  Last Fill Quantity: 60 tablet,   # refills: 3  Last Office Visit: 2/13/2024 [facility]  Future Office visit:       Routing refill request to provider for review/approval because:  Drug not on the [facility], [facility] or [facility] refill protocol or controlled substance

## 2024-03-07 ENCOUNTER — VIRTUAL VISIT (OUTPATIENT)
Dept: FAMILY MEDICINE | Facility: CLINIC | Age: 84
End: 2024-03-07
Payer: MEDICARE

## 2024-03-07 DIAGNOSIS — R39.9 URINARY TRACT INFECTION SYMPTOMS: Primary | ICD-10-CM

## 2024-03-07 LAB
ALBUMIN UR-MCNC: NEGATIVE MG/DL
APPEARANCE UR: CLEAR
BACTERIA #/AREA URNS HPF: ABNORMAL /HPF
BILIRUB UR QL STRIP: NEGATIVE
COLOR UR AUTO: YELLOW
GLUCOSE UR STRIP-MCNC: NEGATIVE MG/DL
HGB UR QL STRIP: ABNORMAL
KETONES UR STRIP-MCNC: NEGATIVE MG/DL
LEUKOCYTE ESTERASE UR QL STRIP: ABNORMAL
NITRATE UR QL: NEGATIVE
PH UR STRIP: 5.5 [PH] (ref 5–7)
RBC #/AREA URNS AUTO: ABNORMAL /HPF
SP GR UR STRIP: 1.01 (ref 1–1.03)
UROBILINOGEN UR STRIP-ACNC: 0.2 E.U./DL
WBC #/AREA URNS AUTO: ABNORMAL /HPF

## 2024-03-07 PROCEDURE — 99213 OFFICE O/P EST LOW 20 MIN: CPT | Mod: 95 | Performed by: NURSE PRACTITIONER

## 2024-03-07 PROCEDURE — 81001 URINALYSIS AUTO W/SCOPE: CPT | Mod: 95 | Performed by: NURSE PRACTITIONER

## 2024-03-07 RX ORDER — CEPHALEXIN 500 MG/1
500 CAPSULE ORAL 2 TIMES DAILY
Qty: 10 CAPSULE | Refills: 0 | Status: SHIPPED | OUTPATIENT
Start: 2024-03-07 | End: 2024-03-12

## 2024-03-07 NOTE — PROGRESS NOTES
Mei is a 83 year old who is being evaluated via a billable video visit.      How would you like to obtain your AVS? MyChart  If the video visit is dropped, the invitation should be resent by: Text to cell phone: [phone]  Will anyone else be joining your video visit? No          Assessment & Plan     Urinary tract infection symptoms  UA ordered and given symptoms, patient started on Keflex due to crcl. She is going to bring in urine sample prior to starting abx. Side effects, risks and benefits of medication were discussed with patient. Discussed how and when to take medication. Recommended taking a probiotic and/or eating yogurt every day while on antibiotics and for ~1 week after stopping antibiotics to prevent GI upset. Discussed OTC recommendations for symptom control. Rest, hydration, humidification.     - UA Macroscopic with reflex to Microscopic and Culture - Lab Collect; Future  - cephALEXin (KEFLEX) 500 MG capsule; Take 1 capsule (500 mg) by mouth 2 times daily for 5 days  - UA Macroscopic with reflex to Microscopic and Culture - Lab Collect  - UA Microscopic with Reflex to Culture    Ordering of each unique test  Prescription drug management        BMI  Estimated body mass index is 26.56 kg/m  as calculated from the following:    Height as of 2/13/24: 1.524 m (5').    Weight as of 2/13/24: 61.7 kg (136 lb).         Patient Instructions   You likely have a urinary tract infection.  Take antibiotics as prescribed.  Take a probiotic and/or eat yogurt daily while on antibiotics and 10 days after to prevent GI upset.  Increase fluid intake to 6-8 glasses of water a day.  To prevent infection avoid douching, avoid bubble baths and perineal sprays. Always urinate before and after having intercourse to flush out any harmful bacteria.  Wipe the perineum from front to back after urinating to prevent the spread of bacteria from the rectum.      Subjective   Mei is a 83 year old, presenting for the following  health issues:  Urinary Problem        3/7/2024     8:59 AM   Additional Questions   Roomed by Renetta   Accompanied by none         3/7/2024     9:02 AM   Patient Reported Additional Medications   Patient reports taking the following new medications Zyrtec     HPI     Genitourinary - Female  Onset/Duration: 2 -3 days  Description:   Painful urination (Dysuria): YES- slightly            Frequency: YES, has a terrible urge to go   Blood in urine (Hematuria): No  Delay in urine (Hesitency): No, sometimes will only be 2-3 drops  Intensity: moderate  Progression of Symptoms:  worsening  Accompanying Signs & Symptoms:  Fever/chills: No  Flank pain: No  Nausea and vomiting: No  Vaginal symptoms: none  Abdominal/Pelvic Pain: No  History:   History of frequent UTI s: YES- several years ago   History of kidney stones: YES  Sexually Active: No  Possibility of pregnancy: No  Precipitating or alleviating factors: None  Therapies tried and outcome: none     Additional provider notes: Patient presents virtually via video visit for urinary symptoms for 2-3 days. She feels miserable and would like treatment. She is willing to bring a sample in before starting antibiotics.     Calculated creatinine clearance based on 2/7/24 creatinine: 1.04: 40.62mL/min.    Allergies   Allergen Reactions    Gluten Meal Diarrhea     Celiac disease    Other Reaction(s): celiac disease    Pregabalin Swelling     Other reaction(s): Edema    Swelling legs and hands    Other Reaction(s): Feet Swelling    Other Reaction(s): swelling, hands and legs, Unknown    Tetracyclines & Related      Other reaction(s): *Unknown, Dermatitis  Other reaction(s): rash  PN: LW Reaction: rash  questionable allergy  questionable allergy      Wheat Bran Itching    Allopurinol Itching     Other Reaction(s): itching       Current Outpatient Medications   Medication    acetaminophen (TYLENOL) 500 MG tablet    amLODIPine (NORVASC) 5 MG tablet    BREZTRI AEROSPHERE 160-9-4.8 MCG/ACT  AERO inhaler    cephALEXin (KEFLEX) 500 MG capsule    cyanocobalamin (CYANOCOBALAMIN) 1000 MCG/ML injection    famotidine (PEPCID) 40 MG tablet    Humidifier MISC    hydrocortisone valerate (WEST-YVROSE) 0.2 % ointment    levothyroxine (SYNTHROID/LEVOTHROID) 88 MCG tablet    methocarbamol (ROBAXIN) 500 MG tablet    PREVIDENT 5000 BOOSTER PLUS 1.1 % PSTE    senna-docusate (SENOKOT-S/PERICOLACE) 8.6-50 MG tablet    traMADol (ULTRAM) 50 MG tablet    oxyCODONE (ROXICODONE) 5 MG tablet     No current facility-administered medications for this visit.       Past Medical History:   Diagnosis Date    Abdominal aortic aneurysm (AAA) (H24)     Arthritis     osteo    Cancer (H)     skin cancer    COPD (chronic obstructive pulmonary disease) (H)     Heart disease     Hypertension     PONV (postoperative nausea and vomiting)     Spinal stenosis in cervical region 12/19/2022    Thyroid disease             Review of Systems  Constitutional, HEENT, cardiovascular, pulmonary, gi and gu systems are negative, except as otherwise noted.      Objective             Physical Exam   GENERAL: alert and no distress  EYES: Eyes grossly normal to inspection.  No discharge or erythema, or obvious scleral/conjunctival abnormalities.  RESP: No audible wheeze, cough, or visible cyanosis.    MS: wearing neck brace  SKIN: Visible skin clear. No significant rash, abnormal pigmentation or lesions.  NEURO: Cranial nerves grossly intact.  Mentation and speech appropriate for age.  PSYCH: Appropriate affect, tone, and pace of words          Video-Visit Details    Type of service:  Video Visit   Video Start Time: 1:04 PM  Video End Time:1:12 PM    Originating Location (pt. Location): Home    Distant Location (provider location):  On-site  Platform used for Video Visit: Riccardo  Signed Electronically by: Tiffanie Virk DNP

## 2024-03-11 ENCOUNTER — TRANSFERRED RECORDS (OUTPATIENT)
Dept: HEALTH INFORMATION MANAGEMENT | Facility: CLINIC | Age: 84
End: 2024-03-11
Payer: MEDICARE

## 2024-03-11 NOTE — PATIENT INSTRUCTIONS
You likely have a urinary tract infection.  Take antibiotics as prescribed.  Take a probiotic and/or eat yogurt daily while on antibiotics and 10 days after to prevent GI upset.  Increase fluid intake to 6-8 glasses of water a day.  To prevent infection avoid douching, avoid bubble baths and perineal sprays. Always urinate before and after having intercourse to flush out any harmful bacteria.  Wipe the perineum from front to back after urinating to prevent the spread of bacteria from the rectum.

## 2024-03-26 ENCOUNTER — TELEPHONE (OUTPATIENT)
Dept: FAMILY MEDICINE | Facility: CLINIC | Age: 84
End: 2024-03-26
Payer: MEDICARE

## 2024-03-26 NOTE — TELEPHONE ENCOUNTER
Forms received from  [facility]/ Additional Orders - Physical Therapy (order ID [id number]; [id number]; [id number]) for Dr Cardenas.  Forms placed in provider 'sign me' folder.  Please fax forms to [phone] after completion.    MARTHA Parekh  [facility]

## 2024-03-27 ENCOUNTER — TELEPHONE (OUTPATIENT)
Dept: PULMONOLOGY | Facility: CLINIC | Age: 84
End: 2024-03-27
Payer: MEDICARE

## 2024-03-27 NOTE — TELEPHONE ENCOUNTER
Voicemail left for patient requesting that she come into pulmonary clinic at 3:00 pm on Friday 03/29 instead of 3:30 pm if able.    Renay Francisco LPN  Pulmonary Medicine:  [facility]  Phone: [phone] Fax: [phone]

## 2024-03-29 ENCOUNTER — OFFICE VISIT (OUTPATIENT)
Dept: PULMONOLOGY | Facility: CLINIC | Age: 84
End: 2024-03-29
Payer: MEDICARE

## 2024-03-29 VITALS
DIASTOLIC BLOOD PRESSURE: 84 MMHG | OXYGEN SATURATION: 98 % | WEIGHT: 136 LBS | SYSTOLIC BLOOD PRESSURE: 124 MMHG | HEART RATE: 79 BPM | BODY MASS INDEX: 26.56 KG/M2

## 2024-03-29 DIAGNOSIS — I25.10 CORONARY ARTERY CALCIFICATION SEEN ON CT SCAN: Primary | ICD-10-CM

## 2024-03-29 DIAGNOSIS — J43.9 PULMONARY EMPHYSEMA, UNSPECIFIED EMPHYSEMA TYPE (H): ICD-10-CM

## 2024-03-29 PROCEDURE — 99214 OFFICE O/P EST MOD 30 MIN: CPT | Performed by: PHYSICIAN ASSISTANT

## 2024-03-29 RX ORDER — FLUTICASONE PROPIONATE 50 MCG
1 SPRAY, SUSPENSION (ML) NASAL DAILY
Qty: 15.8 ML | Refills: 11 | Status: SHIPPED | OUTPATIENT
Start: 2024-03-29

## 2024-03-29 RX ORDER — BUDESONIDE, GLYCOPYRROLATE, AND FORMOTEROL FUMARATE 160; 9; 4.8 UG/1; UG/1; UG/1
2 AEROSOL, METERED RESPIRATORY (INHALATION) 2 TIMES DAILY
Qty: 32.1 G | Refills: 3 | Status: SHIPPED | OUTPATIENT
Start: 2024-03-29

## 2024-03-29 NOTE — PROGRESS NOTES
[facility]- Pulmonary Clinic  Follow Up Visit    Name: Yvonne Maria MRN: [id number]     Age: 83 year old   YOB: 1940       Reason for Visit:   Chief Complaint   Patient presents with    Follow Up    COPD             Assessment and Plan:       # Emphysema  History of emphysema on CT scans, PFTs normal. Was up to 1.5 ppd x 27 years, quit 1986. Subjectively benefits from triple inhaler therapy, continue.     # Nasal congestion  New x 3 weeks, she thinks she might have new allergies. Start trial flonase daily PRN or OTC antihistamine    # Dyspnea on exertion  Lung function seems stable. Had a cardiac cath 5/2021 which identified moderate CAD in the LAD and RCA. It's been a while since she's seen Cardiology. They had recommended statins but her mother had a bad reaction to them so the patient elected not to take them, just changed her diet. Recommend follow up with Cardiology and continued activity as tolerated.     # GERD   Continue pepcid    # Healthcare maintenance  Immunizations: UTD flu, COVID booster, pneumonia and RSV      Return to clinic in 1 year or sooner if needed      35 minutes spent reviewing chart, reviewing test results, talking with and examining patient, formulating plan, and documentation on the day of the encounter.    CASANDRA [facility], General Pulmonology            HPI:   Yvonne Maria is a 83 year old female with history of CAD, osteoporosis, CKD, and COPD who presents for annual follow up of COPD. Last seen in clinic with Dr. Linares 1/2023. Maintained on Breztri.    Since last clinic visit she has mostly been stable, no AECOPD. For the past 3 weeks she has had nasal congestion. No other symptoms. She had neck surgery 2/6/24 and was in a collar until 2 weeks ago, so she's been pretty sedentary. She is in physical therapy now. Has more breathlessness going up stairs  which started before the surgery, not worse since the surgery. Attiributes mostly  to inactivity- not walking as much since she had COVID Fall 2022. No postnasal drip or cough. Blows her nose often, which is new for her. Has history of seasonal allergies for the past couple of years. Saw an allergist years ago, is allergic to cats and dogs. She has 2 dogs that don't bother her. No chest tightness or wheeze. Pepcid twice daily works well for her.     Using Breztri daily, which does seem to help. Uses an OTC antihistamine once in a while.     10 point ROS performed and negative except for as noted in HPI.    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 No family history of lung cancer or chronic lung disease. She has a 2 dogs. Lives in a house which was built in 1974, no flooding issues. There are some molds in the house but she has not been exposed to. Was up to 1.5 ppd x 27 years, quit 1986           Past Medical History:     Past Medical History:   Diagnosis Date    Abdominal aortic aneurysm (AAA) (H24)     Arthritis     osteo    Cancer (H)     skin cancer    COPD (chronic obstructive pulmonary disease) (H)     Heart disease     Hypertension     PONV (postoperative nausea and vomiting)     Spinal stenosis in cervical region 12/19/2022    Thyroid disease              Past Surgical History:      Past Surgical History:   Procedure Laterality Date    APPENDECTOMY      BACK SURGERY      CERVICAL FUSION  02/06/2024    C2 , Posterior    CHOLECYSTECTOMY      CV CORONARY ANGIOGRAM N/A 05/28/2021    Procedure: CV CORONARY ANGIOGRAM;  Surgeon: Moses Styles MD;  Location: [facility] CARDIAC CATH LAB    ENT SURGERY      tonsils    EYE SURGERY      cataracts    GI SURGERY      hiatel hernia repair             Social History:     Social History     Socioeconomic History    Marital status:      Spouse name: Not on file    Number of children: Not on file    Years of education: Not on file    Highest education level: Not on file   Occupational History    Not on file   Tobacco Use     Smoking status: Former     Packs/day: 1.00     Years: 27.00     Additional pack years: 0.00     Total pack years: 27.00     Types: Cigarettes     Quit date: 3/28/1986     Years since quittin.0     Passive exposure: Past    Smokeless tobacco: Never   Vaping Use    Vaping Use: Never used   Substance and Sexual Activity    Alcohol use: Yes     Alcohol/week: 0.0 standard drinks of alcohol     Comment: Wine 5-7 glasses per week    Drug use: No    Sexual activity: Not Currently     Partners: Male   Other Topics Concern    Parent/sibling w/ CABG, MI or angioplasty before 65F 55M? No   Social History Narrative    Not on file     Social Determinants of Health     Financial Resource Strain: Low Risk  (2024)    Financial Resource Strain     Within the past 12 months, have you or your family members you live with been unable to get utilities (heat, electricity) when it was really needed?: No   Food Insecurity: Low Risk  (2024)    Food Insecurity     Within the past 12 months, did you worry that your food would run out before you got money to buy more?: No     Within the past 12 months, did the food you bought just not last and you didn t have money to get more?: No   Transportation Needs: Low Risk  (2024)    Transportation Needs     Within the past 12 months, has lack of transportation kept you from medical appointments, getting your medicines, non-medical meetings or appointments, work, or from getting things that you need?: No   Physical Activity: Not on file   Stress: Not on file   Social Connections: Not on file   Interpersonal Safety: Low Risk  (2023)    Interpersonal Safety     Do you feel physically and emotionally safe where you currently live?: Yes     Within the past 12 months, have you been hit, slapped, kicked or otherwise physically hurt by someone?: No     Within the past 12 months, have you been humiliated or emotionally abused in other ways by your partner or ex-partner?: No   Housing  Stability: Low Risk  (1/25/2024)    Housing Stability     Do you have housing? : Yes     Are you worried about losing your housing?: No            Family History:     Family History   Problem Relation Age of Onset    Myocardial Infarction Father     Heart Disease Maternal Grandmother     Lymphoma Maternal Grandfather     Heart Disease Paternal Grandmother     Myocardial Infarction Son              Allergies:     Allergies   Allergen Reactions    Gluten Meal Diarrhea     Celiac disease    Other Reaction(s): celiac disease    Pregabalin Swelling     Other reaction(s): Edema    Swelling legs and hands    Other Reaction(s): Feet Swelling    Other Reaction(s): swelling, hands and legs, Unknown    Tetracyclines & Related      Other reaction(s): *Unknown, Dermatitis  Other reaction(s): rash  PN: LW Reaction: rash  questionable allergy  questionable allergy      Wheat Itching    Allopurinol Itching     Other Reaction(s): itching             Medications:   acetaminophen (TYLENOL) 500 MG tablet, Take 1,000 mg by mouth every 6 hours  amLODIPine (NORVASC) 5 MG tablet, Take 1 tablet (5 mg) by mouth daily  BREZTRI AEROSPHERE 160-9-4.8 MCG/ACT AERO inhaler, TAKE 2 PUFFS BY MOUTH TWICE A DAY  cyanocobalamin (CYANOCOBALAMIN) 1000 MCG/ML injection, Inject 1 mL (1,000 mcg) into the muscle every 30 days  famotidine (PEPCID) 40 MG tablet, Take 1 tablet (40 mg) by mouth 2 times daily as needed for heartburn  Humidifier MISC, 1 Device daily  hydrocortisone valerate (WEST-YVROSE) 0.2 % ointment, Apply topically as needed  levothyroxine (SYNTHROID/LEVOTHROID) 88 MCG tablet, Take 1 tablet (88 mcg) by mouth daily  methocarbamol (ROBAXIN) 500 MG tablet, Take 1 tablet (500 mg) by mouth 3 times daily as needed for muscle spasms  PREVIDENT 5000 BOOSTER PLUS 1.1 % PSTE,   senna-docusate (SENOKOT-S/PERICOLACE) 8.6-50 MG tablet, Take 2 tablets by mouth 2 times daily as needed (as needed)  traMADol (ULTRAM) 50 MG tablet, TAKE ONE TABLET BY MOUTH TWICE  DAILY AS NEEDED  oxyCODONE (ROXICODONE) 5 MG tablet, 1 tab every 4- 6  hours as needed for sever pain, S/P neck surgery, (Patient not taking: Reported on 3/7/2024)    No current facility-administered medications on file prior to visit.             Exam:   There were no vitals taken for this visit.    Gen: well-appearing, NAD  CV: RRR, no murmurs  Resp: Normal respiratory effort on room air. Clear to auscultation bilaterally without wheezes, rales or ronchi.   Skin: no obvious rashes on gross evaluation  Extremities: No edema  Neuro: alert and appropriate, no focal abnormalities         Data:     I have personally reviewed all pertinent labs, imaging studies and PFT results unless otherwise noted.    Labs:    Imaging:    PFT:

## 2024-03-29 NOTE — NURSING NOTE
Yvonne Maria's goals for this visit include:   Chief Complaint   Patient presents with    Follow Up    COPD       She requests these members of her care team be copied on today's visit information: yes     PCP: Rina Cardenas    Referring Provider:  Referred Self, MD  No address on file    /84 (BP Location: Left arm, Patient Position: Chair, Cuff Size: Adult Regular)   Pulse 79   Wt 61.7 kg (136 lb)   SpO2 98%   BMI 26.56 kg/m      Do you need any medication refills at today's visit? Yes     YARELI Mosquera   Neph/Pulm [facility]

## 2024-03-29 NOTE — PATIENT INSTRUCTIONS
Trial flonase 1 spray each nostril once daily or antihistamine daily  Continue Breztri (alternative is Trelegy)  For shortness of breath I recommend you follow up with Cardiology    Follow up 1 year or sooner if needed

## 2024-04-22 ENCOUNTER — OFFICE VISIT (OUTPATIENT)
Dept: FAMILY MEDICINE | Facility: CLINIC | Age: 84
End: 2024-04-22
Payer: MEDICARE

## 2024-04-22 VITALS
BODY MASS INDEX: 27.33 KG/M2 | HEIGHT: 60 IN | OXYGEN SATURATION: 100 % | TEMPERATURE: 97.4 F | SYSTOLIC BLOOD PRESSURE: 135 MMHG | RESPIRATION RATE: 16 BRPM | DIASTOLIC BLOOD PRESSURE: 77 MMHG | HEART RATE: 85 BPM | WEIGHT: 139.2 LBS

## 2024-04-22 DIAGNOSIS — M48.02 SPINAL STENOSIS IN CERVICAL REGION: ICD-10-CM

## 2024-04-22 DIAGNOSIS — T14.8XXA BRUISE: Primary | ICD-10-CM

## 2024-04-22 DIAGNOSIS — Z98.1 S/P CERVICAL SPINAL FUSION: ICD-10-CM

## 2024-04-22 DIAGNOSIS — I25.10 CORONARY ARTERY DISEASE INVOLVING NATIVE CORONARY ARTERY OF NATIVE HEART WITHOUT ANGINA PECTORIS: ICD-10-CM

## 2024-04-22 DIAGNOSIS — N18.32 STAGE 3B CHRONIC KIDNEY DISEASE (H): ICD-10-CM

## 2024-04-22 PROCEDURE — 99213 OFFICE O/P EST LOW 20 MIN: CPT | Performed by: FAMILY MEDICINE

## 2024-04-22 ASSESSMENT — PAIN SCALES - GENERAL: PAINLEVEL: NO PAIN (0)

## 2024-04-22 NOTE — PROGRESS NOTES
Assessment & Plan     Bruise, upper ext.  Upper extremity bruising, no known injuries, no known bleeding.  Previous workup was negative,  She does not take aspirin, or any blood thinner.    Patient had INR, PTT,  CBC, hepatic function panel , all come back normal.    Discussed with patient likely because she has thin skin,  Advised with supportive care.  She will follow-up with dermatology    Spinal stenosis in cervical region  S/P cervical spinal fusion  Doing better, continue to have decrease ROM  Continue with home daily stretches and exercises  Continue with PT>      Stage 3b chronic kidney disease (H)  Stable,     Coronary artery disease involving native coronary artery of native heart without angina pectoris  Stable, on symptoms  Follow up with Cardiology  Has an appt on 07/08/2024      BMI  Estimated body mass index is 27.19 kg/m  as calculated from the following:    Height as of this encounter: 1.524 m (5').    Weight as of this encounter: 63.1 kg (139 lb 3.2 oz).   Weight management plan: Discussed healthy diet and exercise guidelines      Work on weight loss  Regular exercise    Grace Hurley is a 83 year old, presenting for the following health issues:  Derm Problem    Patient reports she has been having easy bruising on her upper extremities with minor trauma.   no bleeding.  No  bleeding from the nose, no blood in urine or stool.    Patient does not take blood thinners, no aspirin and no NSAID.    History of Present Illness       Reason for visit:  Under skin bleeding    She eats 2-3 servings of fruits and vegetables daily.She consumes 0 sweetened beverage(s) daily.She exercises with enough effort to increase her heart rate 10 to 19 minutes per day.  She exercises with enough effort to increase her heart rate 3 or less days per week.   She is taking medications regularly.         Review of Systems  Constitutional, HEENT, cardiovascular, pulmonary, GI, , musculoskeletal, neuro, skin, endocrine  and psych systems are negative, except as otherwise noted.      Objective    Temp 97.4  F (36.3  C) (Oral)   Resp 16   Ht 1.524 m (5')   Wt 63.1 kg (139 lb 3.2 oz)   BMI 27.19 kg/m    Body mass index is 27.19 kg/m .  Physical Exam   GENERAL: alert and no distress  SKIN: ecchymoses - arms and wrists  PSYCH: mentation appears normal, affect normal/bright    CBC RESULTS:   Recent Labs   Lab Test 01/25/24  1341   WBC 9.1   RBC 4.67   HGB 13.4   HCT 43.1   MCV 92   MCH 28.7   MCHC 31.1*   RDW 13.2         INR   Date Value Ref Range Status   11/20/2023 1.03 0.85 - 1.15 Final   05/28/2021 0.92 0.86 - 1.14 Final             Signed Electronically by: Rina Cardenas MD

## 2024-04-30 ENCOUNTER — TRANSFERRED RECORDS (OUTPATIENT)
Dept: HEALTH INFORMATION MANAGEMENT | Facility: CLINIC | Age: 84
End: 2024-04-30
Payer: MEDICARE

## 2024-06-20 DIAGNOSIS — I10 HYPERTENSION, ESSENTIAL: ICD-10-CM

## 2024-06-20 RX ORDER — AMLODIPINE BESYLATE 5 MG/1
5 TABLET ORAL DAILY
Qty: 90 TABLET | Refills: 3 | Status: SHIPPED | OUTPATIENT
Start: 2024-06-20 | End: 2024-09-26

## 2024-06-21 ENCOUNTER — TRANSFERRED RECORDS (OUTPATIENT)
Dept: HEALTH INFORMATION MANAGEMENT | Facility: CLINIC | Age: 84
End: 2024-06-21
Payer: MEDICARE

## 2024-07-05 NOTE — PROGRESS NOTES
Referring provider: Self-referred    Chief complaint: Chest discomfort    HPI: Ms. Yvonne Maria is a 80 year old  female with PMH significant for    -COPD (panlobular emphysema) on oxygen at night and inhalers  -Pulmonary nodules  -Hypertension, well controlled  -Probable polymyalgia rheumatica  -Ménière's disease  -Celiac disease  -Former smoker    Patient is being seen today for chest discomfort over the last 6 to 8 months.  Chest discomfort is around the epigastric and lower midsternal chest.  The discomfort radiates to her back.  Symptom occurs both at rest and with exertion mainly with stairs.  She tells me that she feels the discomfort while sitting in clinic right now.  It is mild.  Does not happen every day.  Sometimes associated with sweating but denies nausea.  Last for 20 to 30 minutes.  She tells me that she herself has decided to see cardiology, GI and pulmonology for this particular symptom.  She is scheduled for upper endoscopy on 5/18.    Patient was evaluated in cardiology at [facility] in 2017 for similar symptoms.  She had CT coronary angiogram in 2017 which showed elevated coronary artery calcium score at 333 with no obstructive CAD.  She was started on statin (she recalls the dose was 40 mg but cannot recall the name) which she could not tolerate and stopped.  Patient expressed her unpleasant memory about this because she tells me that the statin was never discussed with her before she was started on this treatment.    Patient has history of Ménière's and she tells me that she was started on triamterene hydrochlorothiazide initially 2 tablets a day.  She has made significant lifestyle changes.  She decreased salt intake and she was able to lower the dose of the medication to once daily now.    She has history of hiatal hernia surgery in 2019. She has COPD currently on inhalers and oxygen at night. Patient has seen rheumatology in the past for suspicious temporal arteritis.  Temporal  artery biopsy was unremarkable.  She was on steroids for polymyalgia rheumatica. She tells me that she has celiac disease currently well controlled with diet.    No history of diabetes.  She has hyperlipidemia currently not on treatment.      Patient has 27-pack-year history of smoking.  Quit date 1986.    Current medications are triamterene hydrochlorothiazide 1 tablet a day, aspirin 81 mg, potassium supplement, prednisone 5 mg once daily.    Medications, personal, family, and social history reviewed with patient and revised.    Interval history (6/2/2021):  Patient had coronary angiogram on 5/28/2021 which showed non-obstructive coronary artery disease (pLAD: 50%; mRCA: 60%). Patient was started on Imdur 30 mg daily. Rosuvastatin was increased to 40 mg daily. Patient states after  taking  Rosuvastatin and imdur, she could not get out of bed the next day. She had achiness all over her body. She felt weak and fatigued. Patient stopped taking 40 mg of Rosuvastatin and started taking the 10 mg daily, starting today. She denies chest pain. SOB with exertion is stable, and she attributes this to her emphysema. No lightheadedness or dizziness. No syncope or presyncope.      Interval history 8/16/2022:  Patient is being seen today for annual follow-up.  Over the last 1 year she tells me that she stopped taking cholesterol medication thinking that it might be cause of her chronic pain.  She tells me she has chronic pain all over the body.  She is taking tramadol almost every day.  Denies exertional chest pain.  Mild shortness of breath with activity.  She has started walking on the treadmill for 20 minutes.  She reports feeling lightheaded and a little bit dizzy when she is standing or standing over the last 1 year.  No syncope.  Does not happen every day.  She tells me she has not been on aspirin all along.  She has seen nephrology for CKD and she was recommended to start amlodipine 5 mg for HTN.  She does not monitor  blood pressure at home.    Interval history 7/8/2024:  Patient is being seen today for lightheadedness worsening since last fall.  She tells me that she feels lightheaded throughout the whole day which does not change when she is sitting or standing.  No falls or syncope.  No lower extremity edema.  Occasional chest pain once in a while.  Patient tells me that she has seen ENT for this purpose.  She has history of Ménière's.  She was told that she might have balance issues and referred to balance center.  She tells me that she recently had MRI of the brain and head neck.  She was told that they are okay.   She actually underwent neck disc fusion surgery recently.  Since then pain has resolved but continues to have lightheadedness.    PAST MEDICAL HISTORY:  Past Medical History:   Diagnosis Date    Abdominal aortic aneurysm (AAA) (H24)     Arthritis     osteo    Cancer (H)     skin cancer    COPD (chronic obstructive pulmonary disease) (H)     Heart disease     Hypertension     PONV (postoperative nausea and vomiting)     Spinal stenosis in cervical region 12/19/2022    Thyroid disease        CURRENT MEDICATIONS:  Current Outpatient Medications   Medication Sig Dispense Refill    acetaminophen (TYLENOL) 500 MG tablet Take 1,000 mg by mouth every 6 hours      amLODIPine (NORVASC) 5 MG tablet Take 1 tablet (5 mg) by mouth daily 90 tablet 3    budeson-glycopyrrol-formoterol (BREZTRI AEROSPHERE) 160-9-4.8 MCG/ACT AERO inhaler Inhale 2 puffs into the lungs 2 times daily 32.1 g 3    cyanocobalamin (CYANOCOBALAMIN) 1000 MCG/ML injection Inject 1 mL (1,000 mcg) into the muscle every 30 days 1 mL 11    famotidine (PEPCID) 40 MG tablet Take 1 tablet (40 mg) by mouth 2 times daily as needed for heartburn 180 tablet 3    fluticasone (FLONASE) 50 MCG/ACT nasal spray Spray 1 spray into both nostrils daily (Patient not taking: Reported on 4/22/2024) 15.8 mL 11    Humidifier MISC 1 Device daily (Patient not taking: Reported on  4/22/2024) 1 each 0    hydrocortisone valerate (WEST-YVROSE) 0.2 % ointment Apply topically as needed      levothyroxine (SYNTHROID/LEVOTHROID) 88 MCG tablet Take 1 tablet (88 mcg) by mouth daily 90 tablet 3    methocarbamol (ROBAXIN) 500 MG tablet Take 1 tablet (500 mg) by mouth 3 times daily as needed for muscle spasms 90 tablet 0    PREVIDENT 5000 BOOSTER PLUS 1.1 % PSTE       senna-docusate (SENOKOT-S/PERICOLACE) 8.6-50 MG tablet Take 2 tablets by mouth 2 times daily as needed (as needed) 180 tablet 3    traMADol (ULTRAM) 50 MG tablet TAKE ONE TABLET BY MOUTH TWICE DAILY AS NEEDED 60 tablet 3       PAST SURGICAL HISTORY:  Past Surgical History:   Procedure Laterality Date    APPENDECTOMY      BACK SURGERY      CERVICAL FUSION  02/06/2024    C2 , Posterior    CHOLECYSTECTOMY      CV CORONARY ANGIOGRAM N/A 05/28/2021    Procedure: CV CORONARY ANGIOGRAM;  Surgeon: Moses Styles MD;  Location:  [facility]    ENT SURGERY      tonsils    EYE SURGERY      cataracts    GI SURGERY      hiatel hernia repair       ALLERGIES:     Allergies   Allergen Reactions    Gluten Meal Diarrhea     Celiac disease    Other Reaction(s): celiac disease    Pregabalin Swelling     Other reaction(s): Edema    Swelling legs and hands    Other Reaction(s): Feet Swelling    Other Reaction(s): swelling, hands and legs, Unknown    Tetracyclines & Related      Other reaction(s): *Unknown, Dermatitis  Other reaction(s): rash  PN: LW Reaction: rash  questionable allergy  questionable allergy      Wheat Itching    Allopurinol Itching     Other Reaction(s): itching       FAMILY HISTORY:  Family History   Problem Relation Age of Onset    Myocardial Infarction Father     Heart Disease Maternal Grandmother     Lymphoma Maternal Grandfather     Heart Disease Paternal Grandmother     Myocardial Infarction Son          SOCIAL HISTORY:  Social History     Tobacco Use    Smoking status: Former     Current packs/day: 0.00     Average  packs/day: 1 pack/day for 27.0 years (27.0 ttl pk-yrs)     Types: Cigarettes     Start date: 3/28/1959     Quit date: 3/28/1986     Years since quittin.2     Passive exposure: Past    Smokeless tobacco: Never   Vaping Use    Vaping status: Never Used   Substance Use Topics    Alcohol use: Yes     Alcohol/week: 0.0 standard drinks of alcohol     Comment: Wine 5-7 glasses per week    Drug use: No       ROS:   Constitutional: No fever, chills, or sweats. Weight stable.   Cardiovascular: As per HPI.     Exam:  BP (!) 150/79   Pulse 80   Ht 1.524 m (5')   Wt 63.5 kg (140 lb)   SpO2 98%   BMI 27.34 kg/m      GENERAL APPEARANCE: alert and no distress  HEENT: no icterus, no central cyanosis  CARDIOVASCULAR: regular rhythm, normal S1, S2, no S3 or S4 and no murmur, click or rub, precordium quiet with normal PMI.  EXTREMITIES: no edema  NEURO: alert, normal speech,and affect  SKIN: no ecchymoses, no rashes     I have reviewed the labs and personally reviewed the imaging below and made my comment in the assessment and plan.    Labs:  CBC RESULTS:   Lab Results   Component Value Date    WBC 9.1 2024    WBC 8.7 2021    RBC 4.67 2024    RBC 4.89 2021    HGB 13.4 2024    HGB 14.1 2021    HCT 43.1 2024    HCT 42.6 2021    MCV 92 2024    MCV 87 2021    MCH 28.7 2024    MCH 28.8 2021    MCHC 31.1 (L) 2024    MCHC 33.1 2021    RDW 13.2 2024    RDW 13.8 2021     2024     2021       BMP RESULTS:  Lab Results   Component Value Date     2024     2021    POTASSIUM 4.3 2024    POTASSIUM 4.1 11/10/2022    POTASSIUM 4.4 2021    CHLORIDE 104 2024    CHLORIDE 109 11/10/2022    CHLORIDE 103 2021    CO2 25 2024    CO2 26 11/10/2022    CO2 30 2021    ANIONGAP 11 2024    ANIONGAP 5 11/10/2022    ANIONGAP 4 2021     (H) 2024      (H) 11/10/2022     (H) 05/28/2021    BUN 10.8 01/25/2024    BUN 21 11/10/2022    BUN 19 05/28/2021    CR 1.16 (H) 01/25/2024    CR 1.14 (H) 05/28/2021    GFRESTIMATED 47 (L) 01/25/2024    GFRESTIMATED 45 (L) 05/28/2021    GFRESTBLACK 53 (L) 05/28/2021    ARTURO 9.8 01/25/2024    ARTURO 9.9 05/28/2021     Recent Labs   Lab Test 05/06/21  0930   CHOL 225*   HDL 77   *   TRIG 134         CT coronary angiogram 8/9/2017 [employer] [facility]    PLAQUE TYPE:  Hard: Calcium Score = 333, 79th percentile for matched age   and sex.     SCAN QUALITY: Good.    FINDINGS:    CORONARY ANATOMY:  (Right Dominant)    LEFT MAIN:  There is moderate distal left main calcification.  There is no   stenosis.    LEFT ANTERIOR DESCENDING:  Diffuse calcification with no stenosis.    FIRST DIAGONAL:  No stenoses.     SECOND DIAGONAL:  No stenoses.       CIRCUMFLEX:  Nondominant.  No stenosis.    FIRST OBTUSE MARGINAL:  No stenoses.     SECOND OBTUSE MARGINAL:  No stenoses.       RIGHT CORONARY ARTERY:  Dominant.  Diffuse calcification.  Careful review   of the right coronary artery shows a mild to moderate mid stenosis before   the right ventricular branch.      AORTA:  Proximal ascending aorta, mid-through distal descending thoracic   aorta is normal.    PERICARDIUM:  Normal thickness and without an effusion.    Exercise Stress echocardiogram [facility] 5/16/2013   1. LV function is normal. The visually estimated ejection fraction is 60%.        2. No obvious wall motion abnormalities, however endocardial definition is       limited.                                                                           3. Normal right ventricular size and systolic function.                           4. No hemodynamically significant valvular disease.                               5. No evidence of pulmonary hypertension. The estimated pulmonary artery         systolic pressure is normal at 20.6 mmHg plus right atrial pressure.               6.  No pericardial effusion.     EKG personally reviewed in clinic sinus rhythm otherwise normal.    Echocardiogram 4/30/2021:  Global and regional left ventricular function is normal with an EF of 60-65%.  Global right ventricular function is normal.  No significant valvular abnormalities were noted.  The inferior vena cava was normal in size with preserved respiratory  variability.  Previous study not available for comparison.    CT coronary angiogram 5/12/2021  IMPRESSION:  1.  Severe mid RCA stenosis, and moderate stenosis in the proximal to  mid LAD. Results conveyed to referring provider.  2.  Total Agatston score 491 placing the patient in the 81 percentile  when compared to age and gender matched control group.    Coronary angiogram 5/28/2021 [facility]:  Left Main   The vessel was visualized by selective angiography and is moderate in size. There was mildly calcified vessel disease.   Left Anterior Descending   The vessel is moderate in size.   Prox LAD to Mid LAD lesion is 50% stenosed.   First Diagonal Branch   The vessel is small.   First Septal Branch   The vessel is small and is angiographically normal.   Ramus Intermedius   The vessel is moderate in size.   Lateral Ramus Intermedius   The vessel is small and is angiographically normal.   Left Circumflex   The vessel is moderate in size and is angiographically normal.   First Obtuse Marginal Branch   The vessel is moderate in size and is angiographically normal.   Second Obtuse Marginal Branch   The vessel is moderate in size and is angiographically normal.   Lateral Second Obtuse Marginal Branch   The vessel is small and is angiographically normal.   Third Obtuse Marginal Branch   The vessel is small and is angiographically normal.   Lateral Third Obtuse Marginal Branch   The vessel is small and is angiographically normal.   Right Coronary Artery   The vessel was visualized by selective angiography and is small. There was mildly calcified vessel disease.   Prox  RCA lesion is 50% stenosed.   Mid RCA lesion is 60% stenosed.   Acute Marginal Branch   The vessel is small and is angiographically normal.   Right Ventricular Branch   The vessel is small and is angiographically normal.   Right Posterior Descending Artery   The vessel is small and is angiographically normal.     Assessment and Plan:     # Nonobstructive coronary artery disease  -No concerning exertional cardiac symptoms at this time.  -Started Lipitor 10 mg today for secondary prevention  -No aspirin for now given easy bruising.    # Hypertension, not well-controlled  -Continue amlodipine 5 mg daily  -Blood pressure high in clinic today.  Recommend to monitor at home for a week and return to clinic 1 week to recheck blood pressure control.    #Nonpositional dizziness and lightheadedness  -Unlikely cardiac.  Not positional therefore not orthostatic.  Per ENT could be balance issues.  -Will refer to neurology.    RTC as needed.    I will see patient as needed.      Total time spent 35 minutes including precharting, face-to-face clinic visit, review of labs/imaging and medical documentation     Rachana QUINN MD  [facility] Division of Cardiology  Pager [phone]

## 2024-07-08 ENCOUNTER — OFFICE VISIT (OUTPATIENT)
Dept: CARDIOLOGY | Facility: CLINIC | Age: 84
End: 2024-07-08
Attending: PHYSICIAN ASSISTANT
Payer: MEDICARE

## 2024-07-08 VITALS
OXYGEN SATURATION: 98 % | BODY MASS INDEX: 27.48 KG/M2 | HEART RATE: 80 BPM | WEIGHT: 140 LBS | HEIGHT: 60 IN | SYSTOLIC BLOOD PRESSURE: 150 MMHG | DIASTOLIC BLOOD PRESSURE: 79 MMHG

## 2024-07-08 DIAGNOSIS — R42 DIZZINESS: Primary | ICD-10-CM

## 2024-07-08 DIAGNOSIS — I25.10 MILD CORONARY ARTERY DISEASE: ICD-10-CM

## 2024-07-08 PROCEDURE — 99214 OFFICE O/P EST MOD 30 MIN: CPT | Performed by: INTERNAL MEDICINE

## 2024-07-08 RX ORDER — ATORVASTATIN CALCIUM 10 MG/1
10 TABLET, FILM COATED ORAL DAILY
Qty: 90 TABLET | Refills: 3 | Status: SHIPPED | OUTPATIENT
Start: 2024-07-08 | End: 2024-09-26

## 2024-07-08 NOTE — PATIENT INSTRUCTIONS
Thank you for coming to the [facility] at [place]; please note the following instructions:    1. START: atorvastatin (LIPITOR) 10 MG tablet daily    2. Fasting labs in 3 months    3. Referral to Neurology    4. Cardiology follow up as needed    5. Monitor blood pressure for 1 week. Bring readings to nurse visit          If you have any questions regarding your visit, please contact your care team:     CARDIOLOGY  TELEPHONE NUMBER   Nadira SHORE, Registered Nurse  Delaney GUTIERREZ, Registered Nurse  Rosie MONCADA, Registered Nurse  Cindy RICHARDSON, Registered Medical Assistant  Lyn HERRERA, Certified Medical Assistant  Lauren NINA, Clinic Assistant [phone] (select option 1)    *After hours: [phone]   For Scheduling Appts:     [phone] (select option 1)    *After hours: [phone]   For the Device Clinic (Pacemakers and ICD's)  Joyce ALLEN, Registered Nurse   During business hours: [phone]    *After business hours:  [phone] (select option 4)      Normal test result notifications will be released via [employer] or mailed within 7 business days.  All other test results, will be communicated via telephone once reviewed by your cardiologist.    If you need a medication refill, please contact your pharmacy.  Please allow 3 business days for your refill to be completed.    As always, thank you for trusting us with your health care needs!

## 2024-07-08 NOTE — NURSING NOTE
Chief Complaint   Patient presents with    Follow Up       Initial BP (!) 149/73   Pulse 80   Ht 1.524 m (5')   Wt 63.5 kg (140 lb)   SpO2 98%   BMI 27.34 kg/m   Estimated body mass index is 27.34 kg/m  as calculated from the following:    Height as of this encounter: 1.524 m (5').    Weight as of this encounter: 63.5 kg (140 lb)..  BP completed using cuff size: weston Rojas CMA (AAMA)

## 2024-07-08 NOTE — LETTER
7/8/2024      RE: Yvonne Maria  [address] [address]       Dear Colleague,    Thank you for the opportunity to participate in the care of your patient, Yvonne Maria, at the [facility]. Please see a copy of my visit note below.    Referring provider: Self-referred    Chief complaint: Chest discomfort    HPI: Ms. Yvonne Maria is a 80 year old  female with PMH significant for    -COPD (panlobular emphysema) on oxygen at night and inhalers  -Pulmonary nodules  -Hypertension, well controlled  -Probable polymyalgia rheumatica  -Ménière's disease  -Celiac disease  -Former smoker    Patient is being seen today for chest discomfort over the last 6 to 8 months.  Chest discomfort is around the epigastric and lower midsternal chest.  The discomfort radiates to her back.  Symptom occurs both at rest and with exertion mainly with stairs.  She tells me that she feels the discomfort while sitting in clinic right now.  It is mild.  Does not happen every day.  Sometimes associated with sweating but denies nausea.  Last for 20 to 30 minutes.  She tells me that she herself has decided to see cardiology, GI and pulmonology for this particular symptom.  She is scheduled for upper endoscopy on 5/18.    Patient was evaluated in cardiology at [facility] in 2017 for similar symptoms.  She had CT coronary angiogram in 2017 which showed elevated coronary artery calcium score at 333 with no obstructive CAD.  She was started on statin (she recalls the dose was 40 mg but cannot recall the name) which she could not tolerate and stopped.  Patient expressed her unpleasant memory about this because she tells me that the statin was never discussed with her before she was started on this treatment.    Patient has history of Ménière's and she tells me that she was started on triamterene hydrochlorothiazide initially 2 tablets a day.   She has made significant lifestyle changes.  She decreased salt intake and she was able to lower the dose of the medication to once daily now.    She has history of hiatal hernia surgery in 2019. She has COPD currently on inhalers and oxygen at night. Patient has seen rheumatology in the past for suspicious temporal arteritis.  Temporal artery biopsy was unremarkable.  She was on steroids for polymyalgia rheumatica. She tells me that she has celiac disease currently well controlled with diet.    No history of diabetes.  She has hyperlipidemia currently not on treatment.      Patient has 27-pack-year history of smoking.  Quit date 1986.    Current medications are triamterene hydrochlorothiazide 1 tablet a day, aspirin 81 mg, potassium supplement, prednisone 5 mg once daily.    Medications, personal, family, and social history reviewed with patient and revised.    Interval history (6/2/2021):  Patient had coronary angiogram on 5/28/2021 which showed non-obstructive coronary artery disease (pLAD: 50%; mRCA: 60%). Patient was started on Imdur 30 mg daily. Rosuvastatin was increased to 40 mg daily. Patient states after  taking  Rosuvastatin and imdur, she could not get out of bed the next day. She had achiness all over her body. She felt weak and fatigued. Patient stopped taking 40 mg of Rosuvastatin and started taking the 10 mg daily, starting today. She denies chest pain. SOB with exertion is stable, and she attributes this to her emphysema. No lightheadedness or dizziness. No syncope or presyncope.      Interval history 8/16/2022:  Patient is being seen today for annual follow-up.  Over the last 1 year she tells me that she stopped taking cholesterol medication thinking that it might be cause of her chronic pain.  She tells me she has chronic pain all over the body.  She is taking tramadol almost every day.  Denies exertional chest pain.  Mild shortness of breath with activity.  She has started walking on the  treadmill for 20 minutes.  She reports feeling lightheaded and a little bit dizzy when she is standing or standing over the last 1 year.  No syncope.  Does not happen every day.  She tells me she has not been on aspirin all along.  She has seen nephrology for CKD and she was recommended to start amlodipine 5 mg for HTN.  She does not monitor blood pressure at home.    Interval history 7/8/2024:  Patient is being seen today for lightheadedness worsening since last fall.  She tells me that she feels lightheaded throughout the whole day which does not change when she is sitting or standing.  No falls or syncope.  No lower extremity edema.  Occasional chest pain once in a while.  Patient tells me that she has seen ENT for this purpose.  She has history of Ménière's.  She was told that she might have balance issues and referred to balance center.  She tells me that she recently had MRI of the brain and head neck.  She was told that they are okay.   She actually underwent neck disc fusion surgery recently.  Since then pain has resolved but continues to have lightheadedness.    PAST MEDICAL HISTORY:  Past Medical History:   Diagnosis Date     Abdominal aortic aneurysm (AAA) (H24)      Arthritis     osteo     Cancer (H)     skin cancer     COPD (chronic obstructive pulmonary disease) (H)      Heart disease      Hypertension      PONV (postoperative nausea and vomiting)      Spinal stenosis in cervical region 12/19/2022     Thyroid disease        CURRENT MEDICATIONS:  Current Outpatient Medications   Medication Sig Dispense Refill     acetaminophen (TYLENOL) 500 MG tablet Take 1,000 mg by mouth every 6 hours       amLODIPine (NORVASC) 5 MG tablet Take 1 tablet (5 mg) by mouth daily 90 tablet 3     budeson-glycopyrrol-formoterol (BREZTRI AEROSPHERE) 160-9-4.8 MCG/ACT AERO inhaler Inhale 2 puffs into the lungs 2 times daily 32.1 g 3     cyanocobalamin (CYANOCOBALAMIN) 1000 MCG/ML injection Inject 1 mL (1,000 mcg) into the  muscle every 30 days 1 mL 11     famotidine (PEPCID) 40 MG tablet Take 1 tablet (40 mg) by mouth 2 times daily as needed for heartburn 180 tablet 3     fluticasone (FLONASE) 50 MCG/ACT nasal spray Spray 1 spray into both nostrils daily (Patient not taking: Reported on 4/22/2024) 15.8 mL 11     Humidifier MISC 1 Device daily (Patient not taking: Reported on 4/22/2024) 1 each 0     hydrocortisone valerate (WEST-YVROSE) 0.2 % ointment Apply topically as needed       levothyroxine (SYNTHROID/LEVOTHROID) 88 MCG tablet Take 1 tablet (88 mcg) by mouth daily 90 tablet 3     methocarbamol (ROBAXIN) 500 MG tablet Take 1 tablet (500 mg) by mouth 3 times daily as needed for muscle spasms 90 tablet 0     PREVIDENT 5000 BOOSTER PLUS 1.1 % PSTE        senna-docusate (SENOKOT-S/PERICOLACE) 8.6-50 MG tablet Take 2 tablets by mouth 2 times daily as needed (as needed) 180 tablet 3     traMADol (ULTRAM) 50 MG tablet TAKE ONE TABLET BY MOUTH TWICE DAILY AS NEEDED 60 tablet 3       PAST SURGICAL HISTORY:  Past Surgical History:   Procedure Laterality Date     APPENDECTOMY       BACK SURGERY       CERVICAL FUSION  02/06/2024    C2 , Posterior     CHOLECYSTECTOMY       CV CORONARY ANGIOGRAM N/A 05/28/2021    Procedure: CV CORONARY ANGIOGRAM;  Surgeon: Moses Styles MD;  Location:  HEART CARDIAC CATH LAB     ENT SURGERY      tonsils     EYE SURGERY      cataracts     GI SURGERY      hiatel hernia repair       ALLERGIES:     Allergies   Allergen Reactions     Gluten Meal Diarrhea     Celiac disease    Other Reaction(s): celiac disease     Pregabalin Swelling     Other reaction(s): Edema    Swelling legs and hands    Other Reaction(s): Feet Swelling    Other Reaction(s): swelling, hands and legs, Unknown     Tetracyclines & Related      Other reaction(s): *Unknown, Dermatitis  Other reaction(s): rash  PN: LW Reaction: rash  questionable allergy  questionable allergy       Wheat Itching     Allopurinol Itching     Other Reaction(s):  itching       FAMILY HISTORY:  Family History   Problem Relation Age of Onset     Myocardial Infarction Father      Heart Disease Maternal Grandmother      Lymphoma Maternal Grandfather      Heart Disease Paternal Grandmother      Myocardial Infarction Son          SOCIAL HISTORY:  Social History     Tobacco Use     Smoking status: Former     Current packs/day: 0.00     Average packs/day: 1 pack/day for 27.0 years (27.0 ttl pk-yrs)     Types: Cigarettes     Start date: 3/28/1959     Quit date: 3/28/1986     Years since quittin.2     Passive exposure: Past     Smokeless tobacco: Never   Vaping Use     Vaping status: Never Used   Substance Use Topics     Alcohol use: Yes     Alcohol/week: 0.0 standard drinks of alcohol     Comment: Wine 5-7 glasses per week     Drug use: No       ROS:   Constitutional: No fever, chills, or sweats. Weight stable.   Cardiovascular: As per HPI.     Exam:  BP (!) 150/79   Pulse 80   Ht 1.524 m (5')   Wt 63.5 kg (140 lb)   SpO2 98%   BMI 27.34 kg/m      GENERAL APPEARANCE: alert and no distress  HEENT: no icterus, no central cyanosis  CARDIOVASCULAR: regular rhythm, normal S1, S2, no S3 or S4 and no murmur, click or rub, precordium quiet with normal PMI.  EXTREMITIES: no edema  NEURO: alert, normal speech,and affect  SKIN: no ecchymoses, no rashes     I have reviewed the labs and personally reviewed the imaging below and made my comment in the assessment and plan.    Labs:  CBC RESULTS:   Lab Results   Component Value Date    WBC 9.1 2024    WBC 8.7 2021    RBC 4.67 2024    RBC 4.89 2021    HGB 13.4 2024    HGB 14.1 2021    HCT 43.1 2024    HCT 42.6 2021    MCV 92 2024    MCV 87 2021    MCH 28.7 2024    MCH 28.8 2021    MCHC 31.1 (L) 2024    MCHC 33.1 2021    RDW 13.2 2024    RDW 13.8 2021     2024     2021       BMP RESULTS:  Lab Results   Component Value  Date     01/25/2024     05/28/2021    POTASSIUM 4.3 01/25/2024    POTASSIUM 4.1 11/10/2022    POTASSIUM 4.4 05/28/2021    CHLORIDE 104 01/25/2024    CHLORIDE 109 11/10/2022    CHLORIDE 103 05/28/2021    CO2 25 01/25/2024    CO2 26 11/10/2022    CO2 30 05/28/2021    ANIONGAP 11 01/25/2024    ANIONGAP 5 11/10/2022    ANIONGAP 4 05/28/2021     (H) 01/25/2024     (H) 11/10/2022     (H) 05/28/2021    BUN 10.8 01/25/2024    BUN 21 11/10/2022    BUN 19 05/28/2021    CR 1.16 (H) 01/25/2024    CR 1.14 (H) 05/28/2021    GFRESTIMATED 47 (L) 01/25/2024    GFRESTIMATED 45 (L) 05/28/2021    GFRESTBLACK 53 (L) 05/28/2021    ARTURO 9.8 01/25/2024    ARTURO 9.9 05/28/2021     Recent Labs   Lab Test 05/06/21  0930   CHOL 225*   HDL 77   *   TRIG 134         CT coronary angiogram 8/9/2017 [employer]    PLAQUE TYPE:  Hard: Calcium Score = 333, 79th percentile for matched age   and sex.     SCAN QUALITY: Good.    FINDINGS:    CORONARY ANATOMY:  (Right Dominant)    LEFT MAIN:  There is moderate distal left main calcification.  There is no   stenosis.    LEFT ANTERIOR DESCENDING:  Diffuse calcification with no stenosis.    FIRST DIAGONAL:  No stenoses.     SECOND DIAGONAL:  No stenoses.       CIRCUMFLEX:  Nondominant.  No stenosis.    FIRST OBTUSE MARGINAL:  No stenoses.     SECOND OBTUSE MARGINAL:  No stenoses.       RIGHT CORONARY ARTERY:  Dominant.  Diffuse calcification.  Careful review   of the right coronary artery shows a mild to moderate mid stenosis before   the right ventricular branch.      AORTA:  Proximal ascending aorta, mid-through distal descending thoracic   aorta is normal.    PERICARDIUM:  Normal thickness and without an effusion.    Exercise Stress echocardiogram [facility] 5/16/2013   1. LV function is normal. The visually estimated ejection fraction is 60%.        2. No obvious wall motion abnormalities, however endocardial definition is       limited.                                                                            3. Normal right ventricular size and systolic function.                           4. No hemodynamically significant valvular disease.                               5. No evidence of pulmonary hypertension. The estimated pulmonary artery         systolic pressure is normal at 20.6 mmHg plus right atrial pressure.               6. No pericardial effusion.     EKG personally reviewed in clinic sinus rhythm otherwise normal.    Echocardiogram 4/30/2021:  Global and regional left ventricular function is normal with an EF of 60-65%.  Global right ventricular function is normal.  No significant valvular abnormalities were noted.  The inferior vena cava was normal in size with preserved respiratory  variability.  Previous study not available for comparison.    CT coronary angiogram 5/12/2021  IMPRESSION:  1.  Severe mid RCA stenosis, and moderate stenosis in the proximal to  mid LAD. Results conveyed to referring provider.  2.  Total Agatston score 491 placing the patient in the 81 percentile  when compared to age and gender matched control group.    Coronary angiogram 5/28/2021 [facility]:  Left Main   The vessel was visualized by selective angiography and is moderate in size. There was mildly calcified vessel disease.   Left Anterior Descending   The vessel is moderate in size.   Prox LAD to Mid LAD lesion is 50% stenosed.   First Diagonal Branch   The vessel is small.   First Septal Branch   The vessel is small and is angiographically normal.   Ramus Intermedius   The vessel is moderate in size.   Lateral Ramus Intermedius   The vessel is small and is angiographically normal.   Left Circumflex   The vessel is moderate in size and is angiographically normal.   First Obtuse Marginal Branch   The vessel is moderate in size and is angiographically normal.   Second Obtuse Marginal Branch   The vessel is moderate in size and is angiographically normal.   Lateral Second Obtuse  Marginal Branch   The vessel is small and is angiographically normal.   Third Obtuse Marginal Branch   The vessel is small and is angiographically normal.   Lateral Third Obtuse Marginal Branch   The vessel is small and is angiographically normal.   Right Coronary Artery   The vessel was visualized by selective angiography and is small. There was mildly calcified vessel disease.   Prox RCA lesion is 50% stenosed.   Mid RCA lesion is 60% stenosed.   Acute Marginal Branch   The vessel is small and is angiographically normal.   Right Ventricular Branch   The vessel is small and is angiographically normal.   Right Posterior Descending Artery   The vessel is small and is angiographically normal.     Assessment and Plan:     # Nonobstructive coronary artery disease  -No concerning exertional cardiac symptoms at this time.  -Started Lipitor 10 mg today for secondary prevention  -No aspirin for now given easy bruising.    # Hypertension, not well-controlled  -Continue amlodipine 5 mg daily  -Blood pressure high in clinic today.  Recommend to monitor at home for a week and return to clinic 1 week to recheck blood pressure control.    #Nonpositional dizziness and lightheadedness  -Unlikely cardiac.  Not positional therefore not orthostatic.  Per ENT could be balance issues.  -Will refer to neurology.    RTC as needed.    I will see patient as needed.      Total time spent 35 minutes including precharting, face-to-face clinic visit, review of labs/imaging and medical documentation     Rachana QUINN MD  [facility] Division of Cardiology  Pager [phone]

## 2024-07-09 ENCOUNTER — OFFICE VISIT (OUTPATIENT)
Dept: FAMILY MEDICINE | Facility: CLINIC | Age: 84
End: 2024-07-09
Payer: MEDICARE

## 2024-07-09 VITALS
BODY MASS INDEX: 27.76 KG/M2 | WEIGHT: 141.4 LBS | OXYGEN SATURATION: 99 % | RESPIRATION RATE: 20 BRPM | HEART RATE: 84 BPM | SYSTOLIC BLOOD PRESSURE: 150 MMHG | TEMPERATURE: 98.3 F | DIASTOLIC BLOOD PRESSURE: 78 MMHG | HEIGHT: 60 IN

## 2024-07-09 DIAGNOSIS — N39.0 RECURRENT UTI: Primary | ICD-10-CM

## 2024-07-09 DIAGNOSIS — N18.32 STAGE 3B CHRONIC KIDNEY DISEASE (H): ICD-10-CM

## 2024-07-09 DIAGNOSIS — I10 PRIMARY HYPERTENSION: ICD-10-CM

## 2024-07-09 DIAGNOSIS — M81.0 OSTEOPOROSIS WITHOUT CURRENT PATHOLOGICAL FRACTURE, UNSPECIFIED OSTEOPOROSIS TYPE: ICD-10-CM

## 2024-07-09 DIAGNOSIS — Z98.1 S/P CERVICAL SPINAL FUSION: ICD-10-CM

## 2024-07-09 PROCEDURE — 99214 OFFICE O/P EST MOD 30 MIN: CPT | Performed by: FAMILY MEDICINE

## 2024-07-09 PROCEDURE — G2211 COMPLEX E/M VISIT ADD ON: HCPCS | Performed by: FAMILY MEDICINE

## 2024-07-09 RX ORDER — METHOCARBAMOL 500 MG/1
500 TABLET, FILM COATED ORAL 3 TIMES DAILY PRN
Qty: 90 TABLET | Refills: 0 | Status: SHIPPED | OUTPATIENT
Start: 2024-07-09 | End: 2024-09-26

## 2024-07-09 NOTE — PROGRESS NOTES
Assessment & Plan     Recurrent UTI  Patient was seen in the ER on July 26, 2024 for recurrent bladder infection, she was treated with Keflex.  She reports she has been getting these more frequently.  Currently, she has no symptoms.  Advised patient to drink more water, cut down on the coffee intake.    I did put a standard order for a UA, she could come to the lab, after she makes an appointment for a urine test with every time she has the symptoms.    - UA Macroscopic with reflex to Microscopic and Culture - Lab Collect; Standing    Osteoporosis without current pathological fracture, unspecified osteoporosis type      Stage 3b chronic kidney disease (H)  Stable, last renal function from 03/25/2024    S/P cervical spinal fusion  Doing better, still have stiffness in the neck, completed PT.  - methocarbamol (ROBAXIN) 500 MG tablet; Take 1 tablet (500 mg) by mouth 3 times daily as needed for muscle spasms    Primary hypertension  Continue with Amlodipine  Continue to monitor blood pressure at home  Diet modification  Cut down on Coffee intake.  Increase water intake.    The longitudinal plan of care for the diagnosis(es)/condition(s) as documented were addressed during this visit. Due to the added complexity in care, I will continue to support Mei in the subsequent management and with ongoing continuity of care.   MED REC REQUIRED  Post Medication Reconciliation Status: discharge medications reconciled and changed, per note/orders    Work on weight loss  Regular exercise    Grace Hurley is a 83 year old, presenting for the following health issues:  ER F/U        7/9/2024     1:53 PM   Additional Questions   Roomed by helga hernandez ma     History of Present Illness       Reason for visit:  Followup to ER visit    She eats 4 or more servings of fruits and vegetables daily.She consumes 0 sweetened beverage(s) daily.She exercises with enough effort to increase her heart rate 10 to 19 minutes per day.  She exercises  with enough effort to increase her heart rate 4 days per week.   She is taking medications regularly.         ED/UC Followup:    Facility:  [facility]  Date of visit: 6/26/24  Reason for visit: Dysuria  Current Status: symptoms has gotten better but has had quite a few in the last few months        Review of Systems  Constitutional, HEENT, cardiovascular, pulmonary, GI, , musculoskeletal, neuro, skin, endocrine and psych systems are negative, except as otherwise noted.      Objective    BP (!) 157/85 (BP Location: Right arm, Patient Position: Chair, Cuff Size: Adult Regular)   Pulse 84   Temp 98.3  F (36.8  C) (Oral)   Resp 20   Ht 1.524 m (5')   Wt 64.1 kg (141 lb 6.4 oz)   SpO2 99%   BMI 27.62 kg/m    Body mass index is 27.62 kg/m .  Physical Exam   GENERAL: alert and no distress  RESP: lungs clear to auscultation - no rales, rhonchi or wheezes  CV: regular rate and rhythm, normal S1 S2, no S3 or S4, no murmur, click or rub, no peripheral edema  ABDOMEN: soft, nontender, no hepatosplenomegaly, no masses and bowel sounds normal  MS: no gross musculoskeletal defects noted, no edema  PSYCH: mentation appears normal, affect normal/bright    Orders Placed This Encounter   Procedures    REVIEW OF HEALTH MAINTENANCE PROTOCOL ORDERS    UA Macroscopic with reflex to Microscopic and Culture - Lab Collect      UA RESULTS:  Recent Labs   Lab Test 03/07/24  1408   COLOR Yellow   APPEARANCE Clear   URINEGLC Negative   URINEBILI Negative   URINEKETONE Negative   SG 1.010   UBLD Trace*   URINEPH 5.5   PROTEIN Negative   UROBILINOGEN 0.2   NITRITE Negative   LEUKEST Small*   RBCU 0-2   WBCU 5-10*            Signed Electronically by: Rina Cardenas MD

## 2024-07-15 ENCOUNTER — MEDICAL CORRESPONDENCE (OUTPATIENT)
Dept: HEALTH INFORMATION MANAGEMENT | Facility: CLINIC | Age: 84
End: 2024-07-15

## 2024-07-15 ENCOUNTER — ALLIED HEALTH/NURSE VISIT (OUTPATIENT)
Dept: CARDIOLOGY | Facility: CLINIC | Age: 84
End: 2024-07-15
Payer: MEDICARE

## 2024-07-15 VITALS — OXYGEN SATURATION: 100 % | HEART RATE: 87 BPM | SYSTOLIC BLOOD PRESSURE: 139 MMHG | DIASTOLIC BLOOD PRESSURE: 78 MMHG

## 2024-07-15 DIAGNOSIS — I10 BENIGN ESSENTIAL HYPERTENSION: Primary | ICD-10-CM

## 2024-07-15 PROCEDURE — 99207 PR NO CHARGE LOS: CPT | Performed by: INTERNAL MEDICINE

## 2024-07-15 NOTE — NURSING NOTE
I met with Yvonne Maria at the request of Dr. Mansfield to recheck her blood pressure.  Blood pressure medications on the med list were reviewed with patient.    Patient has taken all medications as per usual regimen: Yes  Patient reports tolerating them without any issues or concerns: is not currently taking a statin     Vitals:    07/15/24 1109   BP: 139/78   BP Location: Right arm   Patient Position: Chair   Cuff Size: Adult Regular   Pulse: 87   SpO2: 100%       Blood pressure was taken, previous encounter was reviewed, patient was instructed to continue all current medications .     Evelyn RAUSCH

## 2024-07-30 DIAGNOSIS — M54.50 CHRONIC MIDLINE LOW BACK PAIN WITHOUT SCIATICA: ICD-10-CM

## 2024-07-30 DIAGNOSIS — M48.02 SPINAL STENOSIS IN CERVICAL REGION: ICD-10-CM

## 2024-07-30 DIAGNOSIS — M81.0 OSTEOPOROSIS WITHOUT CURRENT PATHOLOGICAL FRACTURE, UNSPECIFIED OSTEOPOROSIS TYPE: ICD-10-CM

## 2024-07-30 DIAGNOSIS — M47.812 OSTEOARTHRITIS OF CERVICAL SPINE, UNSPECIFIED SPINAL OSTEOARTHRITIS COMPLICATION STATUS: ICD-10-CM

## 2024-07-30 DIAGNOSIS — G89.29 CHRONIC MIDLINE LOW BACK PAIN WITHOUT SCIATICA: ICD-10-CM

## 2024-07-30 RX ORDER — TRAMADOL HYDROCHLORIDE 50 MG/1
50 TABLET ORAL 2 TIMES DAILY PRN
Qty: 60 TABLET | Refills: 2 | Status: SHIPPED | OUTPATIENT
Start: 2024-07-30 | End: 2024-09-26

## 2024-08-12 ENCOUNTER — PATIENT OUTREACH (OUTPATIENT)
Dept: CARE COORDINATION | Facility: CLINIC | Age: 84
End: 2024-08-12
Payer: MEDICARE

## 2024-08-12 ENCOUNTER — TRANSFERRED RECORDS (OUTPATIENT)
Dept: HEALTH INFORMATION MANAGEMENT | Facility: CLINIC | Age: 84
End: 2024-08-12
Payer: MEDICARE

## 2024-08-26 ENCOUNTER — PATIENT OUTREACH (OUTPATIENT)
Dept: CARE COORDINATION | Facility: CLINIC | Age: 84
End: 2024-08-26
Payer: MEDICARE

## 2024-08-28 ENCOUNTER — NURSE TRIAGE (OUTPATIENT)
Dept: FAMILY MEDICINE | Facility: CLINIC | Age: 84
End: 2024-08-28

## 2024-08-28 NOTE — TELEPHONE ENCOUNTER
Patient is on medication that warrants a visit with Provider- amlodipine    Pt scheduled via virtual visit with available provider    TOYIN Berman    Triage Nurse  [facility]

## 2024-08-28 NOTE — TELEPHONE ENCOUNTER
[facility]  Rina Cardenas MD      Nurse Triage SBAR    Is this a 2nd Level Triage? YES, LICENSED PRACTITIONER REVIEW IS REQUIRED    S-(situation):     [facility] Transfer.  Pt calling for Paxlovid RX.    B-(background):     Pt has had Paxlovid in the past    A-(assessment):     Symptoms: Started Monday  (< 5 days)    Aching all over.  Head Congestion  Fever  ST  (Pt uses Oxygen at night)- no changes in usual SOB    No-known exposure to Flu    Test Positive: Monday    R-(recommendations):     Pharmacy: [facility] [address]    Patient instructed to call back if symptoms change or if new symptoms present. Patient verbalizes agreement with plan.    TOYIN Jefferson, BSN  [place], [place]    Protocol Recommended Disposition:   Discuss With PCP And Callback By Nurse Within 1 Hour    Panlobular emphysema (H)      Routed to provider    Does the patient meet one of the following criteria for ADS visit consideration?     NOTES: Disposition was determined by the first positive assessment question, therefore all previous assessment questions were negative.     Reason for Disposition   HIGH RISK patient (e.g., weak immune system, age > 64 years, obesity with BMI of 30 or higher, pregnant, chronic lung disease or other chronic medical condition) and COVID symptoms (e.g., cough, fever)  (Exceptions: Already seen by doctor or NP/PA and no new or worsening symptoms.)    Additional Information   Negative: SEVERE difficulty breathing (e.g., struggling for each breath, speaks in single words)   Negative: Difficult to awaken or acting confused (e.g., disoriented, slurred speech)   Negative: Bluish (or gray) lips or face now   Negative: Shock suspected (e.g., cold/pale/clammy skin, too weak to stand, low BP, rapid pulse)   Negative: Sounds like a life-threatening emergency to the triager   Negative: Diagnosed or suspected COVID-19 and symptoms lasting 3 or more weeks   Negative: COVID-19 exposure and  no symptoms   Negative: COVID-19 vaccine reaction suspected (e.g., fever, headache, muscle aches) occurring 1 to 3 days after getting vaccine   Negative: COVID-19 vaccine, questions about   Negative: Lives with someone known to have influenza (flu test positive) and flu-like symptoms (e.g., cough, runny nose, sore throat, SOB; with or without fever)   Negative: Possible COVID-19 symptoms and triager concerned about severity of symptoms or other causes   Negative: COVID-19 and breastfeeding, questions about   Negative: SEVERE or constant chest pain or pressure  (Exception: Mild central chest pain, present only when coughing.)   Negative: MODERATE difficulty breathing (e.g., speaks in phrases, SOB even at rest, pulse 100-120)   Negative: Headache and stiff neck (can't touch chin to chest)   Negative: Oxygen level (e.g., pulse oximetry) 90% or lower   Negative: Chest pain or pressure  (Exception: MILD central chest pain, present only when coughing.)   Negative: Drinking very little and dehydration suspected (e.g., no urine > 12 hours, very dry mouth, very lightheaded)   Negative: Patient sounds very sick or weak to the triager    Protocols used: Coronavirus (COVID-19) Diagnosed or [id number]

## 2024-09-05 ENCOUNTER — TELEPHONE (OUTPATIENT)
Dept: PULMONOLOGY | Facility: CLINIC | Age: 84
End: 2024-09-05
Payer: MEDICARE

## 2024-09-05 NOTE — TELEPHONE ENCOUNTER
[facility] Call Center    Phone Message    May a detailed message be left on voicemail: yes     Reason for Call: Symptoms or Concerns     Current symptom or concern: slight chest tightness, SOB- pt did have COVID about 3 weeks     Symptoms have been present for:  1 month(s)    Pt is calling to speak to a nurse about symptoms.    Pt would also like to talk to someone about oxygen.     Please call pt back at ph: [phone].     Action Taken: Message routed to:  Adult Clinics: Pulmonology p [id number]    Travel Screening: Not Applicable     Date of Service: 9/5

## 2024-09-06 NOTE — TELEPHONE ENCOUNTER
Returning pt's phone call about symptoms:     Left VM to call clinic back, did advise if she was having chest pain to seek care at the ER.    Daniela Yap RN on 9/6/2024 at 9:08 AM

## 2024-09-26 ENCOUNTER — OFFICE VISIT (OUTPATIENT)
Dept: FAMILY MEDICINE | Facility: CLINIC | Age: 84
End: 2024-09-26
Payer: COMMERCIAL

## 2024-09-26 VITALS
HEART RATE: 70 BPM | SYSTOLIC BLOOD PRESSURE: 160 MMHG | OXYGEN SATURATION: 100 % | WEIGHT: 141.4 LBS | RESPIRATION RATE: 18 BRPM | HEIGHT: 60 IN | BODY MASS INDEX: 27.76 KG/M2 | DIASTOLIC BLOOD PRESSURE: 90 MMHG | TEMPERATURE: 98.4 F

## 2024-09-26 DIAGNOSIS — E78.2 MIXED HYPERLIPIDEMIA: ICD-10-CM

## 2024-09-26 DIAGNOSIS — I10 HYPERTENSION, ESSENTIAL: ICD-10-CM

## 2024-09-26 DIAGNOSIS — M54.50 CHRONIC MIDLINE LOW BACK PAIN WITHOUT SCIATICA: ICD-10-CM

## 2024-09-26 DIAGNOSIS — M81.0 OSTEOPOROSIS WITHOUT CURRENT PATHOLOGICAL FRACTURE, UNSPECIFIED OSTEOPOROSIS TYPE: ICD-10-CM

## 2024-09-26 DIAGNOSIS — E03.9 HYPOTHYROIDISM, UNSPECIFIED TYPE: ICD-10-CM

## 2024-09-26 DIAGNOSIS — M47.812 OSTEOARTHRITIS OF CERVICAL SPINE, UNSPECIFIED SPINAL OSTEOARTHRITIS COMPLICATION STATUS: ICD-10-CM

## 2024-09-26 DIAGNOSIS — M48.02 SPINAL STENOSIS IN CERVICAL REGION: ICD-10-CM

## 2024-09-26 DIAGNOSIS — E03.4 HYPOTHYROIDISM DUE TO ACQUIRED ATROPHY OF THYROID: ICD-10-CM

## 2024-09-26 DIAGNOSIS — G89.29 CHRONIC MIDLINE LOW BACK PAIN WITHOUT SCIATICA: ICD-10-CM

## 2024-09-26 DIAGNOSIS — N18.32 STAGE 3B CHRONIC KIDNEY DISEASE (H): ICD-10-CM

## 2024-09-26 DIAGNOSIS — Z00.00 ENCOUNTER FOR ANNUAL PHYSICAL EXAM: Primary | ICD-10-CM

## 2024-09-26 DIAGNOSIS — Z98.1 S/P CERVICAL SPINAL FUSION: ICD-10-CM

## 2024-09-26 LAB
ALBUMIN SERPL BCG-MCNC: 4.1 G/DL (ref 3.5–5.2)
ALP SERPL-CCNC: 113 U/L (ref 40–150)
ALT SERPL W P-5'-P-CCNC: 12 U/L (ref 0–50)
ANION GAP SERPL CALCULATED.3IONS-SCNC: 11 MMOL/L (ref 7–15)
AST SERPL W P-5'-P-CCNC: 27 U/L (ref 0–45)
BILIRUB SERPL-MCNC: 0.2 MG/DL
BUN SERPL-MCNC: 16.8 MG/DL (ref 8–23)
CALCIUM SERPL-MCNC: 9.7 MG/DL (ref 8.8–10.4)
CHLORIDE SERPL-SCNC: 104 MMOL/L (ref 98–107)
CREAT SERPL-MCNC: 1.34 MG/DL (ref 0.51–0.95)
CREAT UR-MCNC: 37 MG/DL
EGFRCR SERPLBLD CKD-EPI 2021: 39 ML/MIN/1.73M2
GLUCOSE SERPL-MCNC: 97 MG/DL (ref 70–99)
HCO3 SERPL-SCNC: 25 MMOL/L (ref 22–29)
MICROALBUMIN UR-MCNC: <12 MG/L
MICROALBUMIN/CREAT UR: NORMAL MG/G{CREAT}
POTASSIUM SERPL-SCNC: 4.4 MMOL/L (ref 3.4–5.3)
PROT SERPL-MCNC: 7.1 G/DL (ref 6.4–8.3)
SODIUM SERPL-SCNC: 140 MMOL/L (ref 135–145)
TSH SERPL DL<=0.005 MIU/L-ACNC: 2.87 UIU/ML (ref 0.3–4.2)

## 2024-09-26 PROCEDURE — 90662 IIV NO PRSV INCREASED AG IM: CPT | Performed by: FAMILY MEDICINE

## 2024-09-26 PROCEDURE — 84443 ASSAY THYROID STIM HORMONE: CPT | Performed by: FAMILY MEDICINE

## 2024-09-26 PROCEDURE — 99397 PER PM REEVAL EST PAT 65+ YR: CPT | Mod: 25 | Performed by: FAMILY MEDICINE

## 2024-09-26 PROCEDURE — 82043 UR ALBUMIN QUANTITATIVE: CPT | Performed by: FAMILY MEDICINE

## 2024-09-26 PROCEDURE — 90471 IMMUNIZATION ADMIN: CPT | Performed by: FAMILY MEDICINE

## 2024-09-26 PROCEDURE — 80053 COMPREHEN METABOLIC PANEL: CPT | Performed by: FAMILY MEDICINE

## 2024-09-26 PROCEDURE — 36415 COLL VENOUS BLD VENIPUNCTURE: CPT | Performed by: FAMILY MEDICINE

## 2024-09-26 PROCEDURE — 82570 ASSAY OF URINE CREATININE: CPT | Performed by: FAMILY MEDICINE

## 2024-09-26 RX ORDER — METHOCARBAMOL 500 MG/1
500 TABLET, FILM COATED ORAL 3 TIMES DAILY PRN
Qty: 90 TABLET | Refills: 4 | Status: SHIPPED | OUTPATIENT
Start: 2024-09-26

## 2024-09-26 RX ORDER — LEVOTHYROXINE SODIUM 88 UG/1
88 TABLET ORAL DAILY
Qty: 90 TABLET | Refills: 3 | Status: SHIPPED | OUTPATIENT
Start: 2024-09-26

## 2024-09-26 RX ORDER — ATORVASTATIN CALCIUM 10 MG/1
10 TABLET, FILM COATED ORAL DAILY
Qty: 90 TABLET | Refills: 3 | Status: SHIPPED | OUTPATIENT
Start: 2024-09-26

## 2024-09-26 RX ORDER — AMLODIPINE BESYLATE 5 MG/1
5 TABLET ORAL DAILY
Qty: 90 TABLET | Refills: 3 | Status: SHIPPED | OUTPATIENT
Start: 2024-09-26

## 2024-09-26 RX ORDER — TRAMADOL HYDROCHLORIDE 50 MG/1
50 TABLET ORAL 2 TIMES DAILY PRN
Qty: 60 TABLET | Refills: 4 | Status: SHIPPED | OUTPATIENT
Start: 2024-10-11

## 2024-09-26 ASSESSMENT — ANXIETY QUESTIONNAIRES
8. IF YOU CHECKED OFF ANY PROBLEMS, HOW DIFFICULT HAVE THESE MADE IT FOR YOU TO DO YOUR WORK, TAKE CARE OF THINGS AT HOME, OR GET ALONG WITH OTHER PEOPLE?: NOT DIFFICULT AT ALL
GAD7 TOTAL SCORE: 0
GAD7 TOTAL SCORE: 0
7. FEELING AFRAID AS IF SOMETHING AWFUL MIGHT HAPPEN: NOT AT ALL
GAD7 TOTAL SCORE: 0

## 2024-09-26 NOTE — PROGRESS NOTES
Preventive Care Visit  [facility]  Rina Cardenas MD, Family Medicine  Sep 26, 2024      Assessment & Plan     Encounter for annual physical exam   Discussed diet, exercise, wellness and other preventive recommendations related to health maintenance.   Follow up as needed for acute issues.    Physical exam recommended in one year.   Fall risks precautions.      Osteoporosis without current pathological fracture, unspecified osteoporosis type    - traMADol (ULTRAM) 50 MG tablet; Take 1 tablet (50 mg) by mouth 2 times daily as needed for severe pain. TAKE ONE TABLET BY MOUTH TWICE DAILY AS NEEDED    Hypertension, essential  Was off medicine, 2nd to take Paxlovid.  Restarted medicine  Monitor blood pressure at home.  - amLODIPine (NORVASC) 5 MG tablet; Take 1 tablet (5 mg) by mouth daily.  - Comprehensive metabolic panel (BMP + Alb, Alk Phos, ALT, AST, Total. Bili, TP); Future    Hypothyroidism, unspecified type    - levothyroxine (SYNTHROID/LEVOTHROID) 88 MCG tablet; Take 1 tablet (88 mcg) by mouth daily.    Mixed hyperlipidemia  Restarted Atorvastatin,  Discussed with patient if she start having stiffness, more joint pain, muscle pain,  she may take 1 tablet every other day, or 3-4 times per  - atorvastatin (LIPITOR) 10 MG tablet; Take 1 tablet (10 mg) by mouth daily.    S/P cervical spinal fusion    - methocarbamol (ROBAXIN) 500 MG tablet; Take 1 tablet (500 mg) by mouth 3 times daily as needed for muscle spasms.    Osteoarthritis of cervical spine, unspecified spinal osteoarthritis complication status    - traMADol (ULTRAM) 50 MG tablet; Take 1 tablet (50 mg) by mouth 2 times daily as needed for severe pain. TAKE ONE TABLET BY MOUTH TWICE DAILY AS NEEDED    Chronic midline low back pain without sciatica    - traMADol (ULTRAM) 50 MG tablet; Take 1 tablet (50 mg) by mouth 2 times daily as needed for severe pain. TAKE ONE TABLET BY MOUTH TWICE DAILY AS NEEDED    Spinal stenosis in  cervical region    - traMADol (ULTRAM) 50 MG tablet; Take 1 tablet (50 mg) by mouth 2 times daily as needed for severe pain. TAKE ONE TABLET BY MOUTH TWICE DAILY AS NEEDED    Patient has been advised of split billing requirements and indicates understanding: Yes     [place] Prescription Monitoring Program, ( ) referenced. No indication of misuse, or abuse.      Counseling  Appropriate preventive services were addressed with this patient via screening, questionnaire, or discussion as appropriate for fall prevention, nutrition, physical activity, Tobacco-use cessation, social engagement, weight loss and cognition.  Checklist reviewing preventive services available has been given to the patient.  Reviewed patient's diet, addressing concerns and/or questions.   Discussed possible causes of fatigue. The patient was provided with written information regarding signs of hearing loss.   Information on urinary incontinence and treatment options given to patient.   I have reviewed Opioid Use Disorder and Substance Use Disorder risk factors and made any needed referrals.       Work on weight loss  Regular exercise    Grace Hurley is a 84 year old, presenting for the following:  Physical    History of hypothyroidism, hypertension.  Patient reports end of August she was diagnosed with COVID she started on Paxlovid.  She was off her blood pressure medication.    Otherwise she denies headache no chest pain, no short of breath.          9/26/2024     8:28 AM   Additional Questions   Roomed by helga hernandez ma       Health Care Directive  Patient does not have a Health Care Directive or Living Will: Discussed advance care planning with patient; however, patient declined at this time.    HPI      Cellulitis. Got med for it, cefdinir, but stop taking it due to her ulcer coming back.    Stopped taking BP med due to her being on the med given for cellulitis.        9/26/2024   General Health   How would you rate your overall  physical health? Good   Feel stress (tense, anxious, or unable to sleep) Not at all            9/26/2024   Nutrition   Diet: Low salt    Vegetarian/vegan    Gluten-free/reduced       Multiple values from one day are sorted in reverse-chronological order         9/26/2024   Exercise   Days per week of moderate/strenous exercise 0 days   Average minutes spent exercising at this level 0 min      (!) EXERCISE CONCERN      9/26/2024   Social Factors   Frequency of gathering with friends or relatives More than three times a week   Worry food won't last until get money to buy more No   Food not last or not have enough money for food? No   Do you have housing? (Housing is defined as stable permanent housing and does not include staying ouside in a car, in a tent, in an abandoned building, in an overnight shelter, or couch-surfing.) Yes   Are you worried about losing your housing? No   Lack of transportation? No   Unable to get utilities (heat,electricity)? No            9/26/2024   Fall Risk   Fallen 2 or more times in the past year? No    No   Trouble with walking or balance? Yes    Yes   Reason Gait Speed Test Not Completed Patient declines       Multiple values from one day are sorted in reverse-chronological order         9/26/2024   Activities of Daily Living- Home Safety   Needs help with the following daily activites None of the above   Safety concerns in the home None of the above            9/26/2024   Dental   Dentist two times every year? (!) DECLINE            9/26/2024   Hearing Screening   Hearing concerns? (!) TROUBLE UNDERSTANDING SOFT OR WHISPERED SPEECH.            9/26/2024   Driving Risk Screening   Patient/family members have concerns about driving No            9/26/2024   General Alertness/Fatigue Screening   Have you been more tired than usual lately? (!) YES            9/26/2024   Urinary Incontinence Screening   Bothered by leaking urine in past 6 months Yes            9/26/2024   TB Screening    Were you born outside of the [place]? No        Today's PHQ-2 Score:       2024     8:23 AM   PHQ-2 (  [employer])   Q1: Little interest or pleasure in doing things 0   Q2: Feeling down, depressed or hopeless 0   PHQ-2 Score 0   Q1: Little interest or pleasure in doing things Not at all   Q2: Feeling down, depressed or hopeless Not at all   PHQ-2 Score 0           2024   Substance Use   Alcohol more than 3/day or more than 7/wk No   Do you have a current opioid prescription? (!) YES   How severe/bad is pain from 1 to 10? 5/10   Do you use any other substances recreationally? No          Social History     Tobacco Use    Smoking status: Former     Current packs/day: 0.00     Average packs/day: 1 pack/day for 27.0 years (27.0 ttl pk-yrs)     Types: Cigarettes     Start date: 3/28/1959     Quit date: 3/28/1986     Years since quittin.5     Passive exposure: Past    Smokeless tobacco: Never   Vaping Use    Vaping status: Never Used   Substance Use Topics    Alcohol use: Yes     Alcohol/week: 0.0 standard drinks of alcohol     Comment: Wine 5-7 glasses per week    Drug use: No                Fracture Risk Assessment Tool  Link to Frax Calculator  Use the information below to complete the Frax calculator  : 1940  Sex: female  Weight (kg): 64.1 kg (actual weight)  Height (cm): 152.4 cm  Previous Fragility Fracture:  No  History of parent with fractured hip:  No  Current Smoking:  No  Patient has been on glucocorticoids for more than 3 months (5mg/day or more): No  Rheumatoid Arthritis on Problem List:  No  Secondary Osteoporosis on Problem List:  No  Consumes 3 or more units of alcohol per day: No  Femoral Neck BMD (g/cm2)            Reviewed and updated as needed this visit by Provider                    Past Medical History:   Diagnosis Date    Abdominal aortic aneurysm (AAA) (H24)     Arthritis     osteo    Cancer (H)     skin cancer    COPD (chronic obstructive pulmonary disease) (H)     Heart  disease     Hypertension     PONV (postoperative nausea and vomiting)     Spinal stenosis in cervical region 12/19/2022    Thyroid disease      Past Surgical History:   Procedure Laterality Date    APPENDECTOMY      BACK SURGERY      CERVICAL FUSION  02/06/2024    C2 , Posterior    CHOLECYSTECTOMY      CV CORONARY ANGIOGRAM N/A 05/28/2021    Procedure: CV CORONARY ANGIOGRAM;  Surgeon: Moses Styles MD;  Location:  [facility]    ENT SURGERY      tonsils    EYE SURGERY      cataracts    GI SURGERY      hiatel hernia repair     OB History   No obstetric history on file.     Current providers sharing in care for this patient include:  Patient Care Team:  Rina Cardenas MD as PCP - General (Family Medicine)  Patricia Porras MD as MD (Nephrology)  Rina Cardenas MD as Assigned PCP  Rina Cardenas MD as Assigned Pain Medication Provider  Anastasia Ragsdale PA-C as Assigned Cancer Care Provider  Rachana Mansfield MD as Assigned Heart and Vascular Provider    The following health maintenance items are reviewed in Epic and correct as of today:  Health Maintenance   Topic Date Due    DEXA  Never done    MICROALBUMIN  03/11/2024    INFLUENZA VACCINE (1) 09/01/2024    COVID-19 Vaccine (9 - 2024-25 season) 09/01/2024    LIPID  09/11/2024    MEDICARE ANNUAL WELLNESS VISIT  09/11/2024    TSH W/FREE T4 REFLEX  09/11/2024    BMP  01/25/2025    HEMOGLOBIN  01/25/2025    ANNUAL REVIEW OF HM ORDERS  07/09/2025    FALL RISK ASSESSMENT  09/26/2025    ADVANCE CARE PLANNING  01/25/2029    DTAP/TDAP/TD IMMUNIZATION (3 - Td or Tdap) 09/11/2033    PARATHYROID  Completed    PHOSPHORUS  Completed    SPIROMETRY  Completed    COPD ACTION PLAN  Completed    PHQ-2 (once per calendar year)  Completed    Pneumococcal Vaccine: 65+ Years  Completed    URINALYSIS  Completed    ALK PHOS  Completed    ZOSTER IMMUNIZATION  Completed    RSV VACCINE  Completed    HPV IMMUNIZATION  Aged Out    MENINGITIS IMMUNIZATION  Aged Out     RSV MONOCLONAL ANTIBODY  Aged Out     Orders Placed This Encounter   Procedures    INFLUENZA HIGH DOSE, TRIVALENT, PF (FLUZONE)    Comprehensive metabolic panel (BMP + Alb, Alk Phos, ALT, AST, Total. Bili, TP)        Review of Systems  Constitutional, HEENT, cardiovascular, pulmonary, GI, , musculoskeletal, neuro, skin, endocrine and psych systems are negative, except as otherwise noted.     Objective    Exam  BP (!) 161/79 (BP Location: Right arm, Patient Position: Chair, Cuff Size: Adult Regular)   Pulse 70   Temp 98.4  F (36.9  C) (Oral)   Resp 18   Ht 1.524 m (5')   Wt 64.1 kg (141 lb 6.4 oz)   SpO2 100%   BMI 27.62 kg/m     Estimated body mass index is 27.62 kg/m  as calculated from the following:    Height as of this encounter: 1.524 m (5').    Weight as of this encounter: 64.1 kg (141 lb 6.4 oz).    Physical Exam  GENERAL: alert and no distress  EYES: Eyes grossly normal to inspection, PERRL and conjunctivae and sclerae normal  HENT: ear canals and TM's normal, nose and mouth without ulcers or lesions  NECK: no adenopathy, no asymmetry, masses, or scars  RESP: lungs clear to auscultation - no rales, rhonchi or wheezes  CV: regular rate and rhythm, normal S1 S2, no S3 or S4, no murmur, click or rub, no peripheral edema  ABDOMEN: soft, nontender, no hepatosplenomegaly, no masses and bowel sounds normal  MS: no gross musculoskeletal defects noted, no edema  SKIN: no suspicious lesions or rashes  NEURO: Normal strength and tone, mentation intact and speech normal  PSYCH: mentation appears normal, affect normal/bright         9/26/2024   Mini Cog   Clock Draw Score 2 Normal   3 Item Recall 2 objects recalled   Mini Cog Total Score 4                 Signed Electronically by: Rina Cardenas MD    Answers submitted by the patient for this visit:  Patient Health Questionnaire (G7) (Submitted on 9/26/2024)  GOSIA 7 TOTAL SCORE: 0

## 2024-09-26 NOTE — LETTER
September 27, 2024      Mei Maria  [address]  [place] [address]        Dear ,    We are writing to inform you of your test results.    Normal for TSH, thyroid function.   Continue with current thyroid medication and dosage     Kidney function remains stable, advised patient to increase more water.   Normal for electrolytes, blood sugar, and liver function     Thanks     Resulted Orders   Albumin Random Urine Quantitative with Creat Ratio   Result Value Ref Range    Creatinine Urine mg/dL 37.0 mg/dL      Comment:      The reference ranges have not been established in urine creatinine. The results should be integrated into the clinical context for interpretation.    Albumin Urine mg/L <12.0 mg/L      Comment:      The reference ranges have not been established in urine albumin. The results should be integrated into the clinical context for interpretation.    Albumin Urine mg/g Cr        Comment:      Unable to calculate, urine albumin and/or urine creatinine is outside detectable limits.  Microalbuminuria is defined as an albumin:creatinine ratio of 17 to 299 for males and 25 to 299 for females. A ratio of albumin:creatinine of 300 or higher is indicative of overt proteinuria.  Due to biologic variability, positive results should be confirmed by a second, first-morning random or 24-hour timed urine specimen. If there is discrepancy, a third specimen is recommended. When 2 out of 3 results are in the microalbuminuria range, this is evidence for incipient nephropathy and warrants increased efforts at glucose control, blood pressure control, and institution of therapy with an angiotensin-converting-enzyme (ACE) inhibitor (if the patient can tolerate it).     TSH WITH FREE T4 REFLEX   Result Value Ref Range    TSH 2.87 0.30 - 4.20 uIU/mL   Comprehensive metabolic panel (BMP + Alb, Alk Phos, ALT, AST, Total. Bili, TP)   Result Value Ref Range    Sodium 140 135 - 145 mmol/L    Potassium 4.4 3.4 - 5.3  mmol/L    Carbon Dioxide (CO2) 25 22 - 29 mmol/L    Anion Gap 11 7 - 15 mmol/L    Urea Nitrogen 16.8 8.0 - 23.0 mg/dL    Creatinine 1.34 (H) 0.51 - 0.95 mg/dL    GFR Estimate 39 (L) >60 mL/min/1.73m2      Comment:      eGFR calculated using 2021 CKD-EPI equation.    Calcium 9.7 8.8 - 10.4 mg/dL      Comment:      Reference intervals for this test were updated on 7/16/2024 to reflect our healthy population more accurately. There may be differences in the flagging of prior results with similar values performed with this method. Those prior results can be interpreted in the context of the updated reference intervals.    Chloride 104 98 - 107 mmol/L    Glucose 97 70 - 99 mg/dL    Alkaline Phosphatase 113 40 - 150 U/L    AST 27 0 - 45 U/L    ALT 12 0 - 50 U/L    Protein Total 7.1 6.4 - 8.3 g/dL    Albumin 4.1 3.5 - 5.2 g/dL    Bilirubin Total 0.2 <=1.2 mg/dL       If you have any questions or concerns, please call the clinic at the number listed above.       Sincerely,      Rina Cardenas MD

## 2024-10-03 ENCOUNTER — LAB (OUTPATIENT)
Dept: LAB | Facility: CLINIC | Age: 84
End: 2024-10-03
Payer: MEDICARE

## 2024-10-03 DIAGNOSIS — N39.0 RECURRENT UTI: ICD-10-CM

## 2024-10-03 DIAGNOSIS — N30.00 ACUTE CYSTITIS WITHOUT HEMATURIA: Primary | ICD-10-CM

## 2024-10-03 LAB
ALBUMIN UR-MCNC: NEGATIVE MG/DL
APPEARANCE UR: ABNORMAL
BACTERIA #/AREA URNS HPF: ABNORMAL /HPF
BILIRUB UR QL STRIP: NEGATIVE
COLOR UR AUTO: YELLOW
GLUCOSE UR STRIP-MCNC: NEGATIVE MG/DL
HGB UR QL STRIP: ABNORMAL
KETONES UR STRIP-MCNC: NEGATIVE MG/DL
LEUKOCYTE ESTERASE UR QL STRIP: ABNORMAL
NITRATE UR QL: NEGATIVE
PH UR STRIP: 5.5 [PH] (ref 5–7)
RBC #/AREA URNS AUTO: ABNORMAL /HPF
SP GR UR STRIP: 1.01 (ref 1–1.03)
UROBILINOGEN UR STRIP-ACNC: 0.2 E.U./DL
WBC #/AREA URNS AUTO: >100 /HPF
WBC CLUMPS #/AREA URNS HPF: PRESENT /HPF

## 2024-10-03 PROCEDURE — 87186 SC STD MICRODIL/AGAR DIL: CPT

## 2024-10-03 PROCEDURE — 87086 URINE CULTURE/COLONY COUNT: CPT

## 2024-10-03 PROCEDURE — 81001 URINALYSIS AUTO W/SCOPE: CPT

## 2024-10-04 LAB — BACTERIA UR CULT: ABNORMAL

## 2024-10-08 ENCOUNTER — LAB (OUTPATIENT)
Dept: LAB | Facility: CLINIC | Age: 84
End: 2024-10-08
Payer: MEDICARE

## 2024-10-08 DIAGNOSIS — E03.4 HYPOTHYROIDISM DUE TO ACQUIRED ATROPHY OF THYROID: ICD-10-CM

## 2024-10-08 DIAGNOSIS — N18.32 STAGE 3B CHRONIC KIDNEY DISEASE (H): Primary | ICD-10-CM

## 2024-10-08 LAB
CHOLEST SERPL-MCNC: 172 MG/DL
FASTING STATUS PATIENT QL REPORTED: YES
HDLC SERPL-MCNC: 77 MG/DL
LDLC SERPL CALC-MCNC: 75 MG/DL
NONHDLC SERPL-MCNC: 95 MG/DL
TRIGL SERPL-MCNC: 101 MG/DL

## 2024-10-08 PROCEDURE — 36415 COLL VENOUS BLD VENIPUNCTURE: CPT

## 2024-10-08 PROCEDURE — 80061 LIPID PANEL: CPT

## 2024-11-19 ENCOUNTER — LAB (OUTPATIENT)
Dept: LAB | Facility: CLINIC | Age: 84
End: 2024-11-19
Payer: COMMERCIAL

## 2024-11-19 DIAGNOSIS — N18.32 STAGE 3B CHRONIC KIDNEY DISEASE (H): Primary | ICD-10-CM

## 2024-11-19 DIAGNOSIS — N39.0 RECURRENT UTI: ICD-10-CM

## 2024-11-19 LAB
BACTERIA #/AREA URNS HPF: ABNORMAL /HPF
RBC #/AREA URNS AUTO: >100 /HPF
WBC #/AREA URNS AUTO: ABNORMAL /HPF

## 2024-11-19 PROCEDURE — 87086 URINE CULTURE/COLONY COUNT: CPT

## 2024-11-19 PROCEDURE — 81015 MICROSCOPIC EXAM OF URINE: CPT

## 2024-11-20 ENCOUNTER — TELEPHONE (OUTPATIENT)
Dept: FAMILY MEDICINE | Facility: CLINIC | Age: 84
End: 2024-11-20
Payer: MEDICARE

## 2024-11-20 ENCOUNTER — TELEPHONE (OUTPATIENT)
Dept: FAMILY MEDICINE | Facility: CLINIC | Age: 84
End: 2024-11-20

## 2024-11-20 LAB — BACTERIA UR CULT: NORMAL

## 2024-11-20 RX ORDER — NITROFURANTOIN 25; 75 MG/1; MG/1
100 CAPSULE ORAL 2 TIMES DAILY
Qty: 10 CAPSULE | Refills: 0 | Status: SHIPPED | OUTPATIENT
Start: 2024-11-20 | End: 2024-11-25

## 2024-11-20 NOTE — TELEPHONE ENCOUNTER
----- Message from Rina Cardenas sent at 11/20/2024  1:05 PM CST -----  Not sure if this been taken care of or not    I have sent Macrobid bid for 5 days    Thanks

## 2024-11-20 NOTE — TELEPHONE ENCOUNTER
Patient calling regarding her UA/UC, she reports she is uncomfortable and wanting something sent in for her. Preferred pharmacy cued.    Thank you,  Lora Escobedo RN

## 2024-11-25 ENCOUNTER — TELEPHONE (OUTPATIENT)
Dept: FAMILY MEDICINE | Facility: CLINIC | Age: 84
End: 2024-11-25
Payer: MEDICARE

## 2024-11-25 NOTE — TELEPHONE ENCOUNTER
RN called patient    She is doing a lot better. No more burning or pain sensation while urinating, but still has some discomfort.    RN scheduled visit for her to be seen by PCP tomorrow    RN advised when to call back and when to go to UC and patient verbalized understanding    Megan Tang RN

## 2024-11-25 NOTE — TELEPHONE ENCOUNTER
Medication Question or Refill    Contacts       Contact Date/Time Type Contact Phone/Fax    11/25/2024 02:40 PM CST In Person (Incoming) Mei Maria (Self)             What medication are you calling about (include dose and sig)?: antibiotics Macrobid bid for 5 days     Preferred Pharmacy:   [facility]/pharmacy #[id number]- [facility] [place] - [address]  [address] [address]  Phone: [phone] Fax: [phone]    [facility][id number] - [place], [place] - [address]  [address] [address]  Phone: [phone] Fax: [phone]      Controlled Substance Agreement on file:   CSA -- Patient Level:     [Media Unavailable] Controlled Substance Agreement - Opioid - Scan on 5/27/2022  7:19 PM       Who prescribed the medication?: Dr. Cardenas    Do you need a refill? Yes, Patient is wondering when they should come in for a repeat UA being that their symptoms have improved but not completely gone away.    When did you use the medication last? 11/25/2024 morning    Patient offered an appointment? No    Do you have any questions or concerns?  Yes: Patient still have symptoms and just wondering if they should have another UA done or need refill or new medication.    Could we send this information to you in [facility] or would you prefer to receive a phone call?:   Patient would prefer a phone call   Okay to leave a detailed message?: Yes at Cell number on file:    Telephone Information:   Mobile [phone]

## 2024-11-26 ENCOUNTER — OFFICE VISIT (OUTPATIENT)
Dept: FAMILY MEDICINE | Facility: CLINIC | Age: 84
End: 2024-11-26
Payer: COMMERCIAL

## 2024-11-26 VITALS
WEIGHT: 137.8 LBS | OXYGEN SATURATION: 99 % | HEIGHT: 60 IN | BODY MASS INDEX: 27.05 KG/M2 | RESPIRATION RATE: 20 BRPM | SYSTOLIC BLOOD PRESSURE: 132 MMHG | TEMPERATURE: 98.5 F | DIASTOLIC BLOOD PRESSURE: 72 MMHG | HEART RATE: 80 BPM

## 2024-11-26 DIAGNOSIS — J01.10 ACUTE NON-RECURRENT FRONTAL SINUSITIS: ICD-10-CM

## 2024-11-26 DIAGNOSIS — N30.00 ACUTE CYSTITIS WITHOUT HEMATURIA: Primary | ICD-10-CM

## 2024-11-26 LAB
ALBUMIN UR-MCNC: NEGATIVE MG/DL
APPEARANCE UR: CLEAR
BILIRUB UR QL STRIP: NEGATIVE
COLOR UR AUTO: YELLOW
GLUCOSE UR STRIP-MCNC: NEGATIVE MG/DL
HGB BLD-MCNC: 12.7 G/DL (ref 11.7–15.7)
HGB UR QL STRIP: NEGATIVE
KETONES UR STRIP-MCNC: NEGATIVE MG/DL
LEUKOCYTE ESTERASE UR QL STRIP: NEGATIVE
NITRATE UR QL: NEGATIVE
PH UR STRIP: 6 [PH] (ref 5–7)
SP GR UR STRIP: 1.01 (ref 1–1.03)
UROBILINOGEN UR STRIP-ACNC: 0.2 E.U./DL

## 2024-11-26 PROCEDURE — 85018 HEMOGLOBIN: CPT | Performed by: FAMILY MEDICINE

## 2024-11-26 PROCEDURE — 99213 OFFICE O/P EST LOW 20 MIN: CPT | Performed by: FAMILY MEDICINE

## 2024-11-26 PROCEDURE — 36415 COLL VENOUS BLD VENIPUNCTURE: CPT | Performed by: FAMILY MEDICINE

## 2024-11-26 PROCEDURE — 81003 URINALYSIS AUTO W/O SCOPE: CPT | Performed by: FAMILY MEDICINE

## 2024-11-26 RX ORDER — AZITHROMYCIN 250 MG/1
TABLET, FILM COATED ORAL
Qty: 6 TABLET | Refills: 0 | Status: SHIPPED | OUTPATIENT
Start: 2024-11-26 | End: 2024-12-01

## 2024-11-26 NOTE — PROGRESS NOTES
"  Assessment & Plan     Acute cystitis without hematuria  Was treated with Macrobid, symptoms been cleared  Negative repeat U/A today  - UA Macroscopic with reflex to Microscopic and Culture - Lab Collect    Acute non-recurrent frontal sinusitis    - azithromycin (ZITHROMAX) 250 MG tablet; Take 2 tablets (500 mg) by mouth daily for 1 day, THEN 1 tablet (250 mg) daily for 4 days.    Patient reports she has been having cough, congestion for almost a month now.  She is feeling better.  Previous chest x-ray from November 7 was normal, no acute finding.  Patient denies any wheezing, no shortness of breath, no chest pain, no fever no chills.      BMI  Estimated body mass index is 26.91 kg/m  as calculated from the following:    Height as of this encounter: 1.524 m (5').    Weight as of this encounter: 62.5 kg (137 lb 12.8 oz).   Weight management plan: Discussed healthy diet and exercise guidelines      Work on weight loss  Regular exercise    Grace Hurley is a 84 year old, presenting for the following health issues:  UTI         11/26/2024     9:50 AM   Additional Questions   Roomed by jyoti scott       Genitourinary - Female  Onset/Duration: for about a month  Description:   Painful urination (Dysuria): Not anymore           Frequency: YES  Blood in urine (Hematuria): Not since she came in last time  Delay in urine (Hesitency): No  Intensity: moderate  Progression of Symptoms:  same  Accompanying Signs & Symptoms:  Fever/chills: No  Flank pain: No  Nausea and vomiting: No  Vaginal symptoms: none  Abdominal/Pelvic Pain: No  History:   History of frequent UTI s: No  History of kidney stones: No  Sexually Active: No  Possibility of pregnancy: No  Precipitating or alleviating factors: None  Therapies tried and outcome: cephalexin; not effective as she still feels \"funny\" whenever she has to go        Review of Systems  Constitutional, HEENT, cardiovascular, pulmonary, GI, , musculoskeletal, neuro, skin, " endocrine and psych systems are negative, except as otherwise noted.      Objective    /72 (BP Location: Right arm, Patient Position: Chair, Cuff Size: Adult Regular)   Pulse 80   Temp 98.5  F (36.9  C) (Oral)   Resp 20   Ht 1.524 m (5')   Wt 62.5 kg (137 lb 12.8 oz)   SpO2 99%   BMI 26.91 kg/m    Body mass index is 26.91 kg/m .  Physical Exam   GENERAL: alert and no distress  HENT: ear canals and TM's normal, nose and mouth without ulcers or lesions  NECK: no adenopathy, no asymmetry, masses, or scars  RESP: lungs clear to auscultation - no rales, rhonchi or wheezes  CV: regular rate and rhythm, normal S1 S2, no S3 or S4, no murmur, click or rub, no peripheral edema  ABDOMEN: soft, nontender, no hepatosplenomegaly, no masses and bowel sounds normal  MS: no gross musculoskeletal defects noted, no edema    UA RESULTS:  Recent Labs   Lab Test 11/26/24  0933 11/19/24  1030   COLOR Yellow  --    APPEARANCE Clear  --    URINEGLC Negative  --    URINEBILI Negative  --    URINEKETONE Negative  --    SG 1.015  --    UBLD Negative  --    URINEPH 6.0  --    PROTEIN Negative  --    UROBILINOGEN 0.2  --    NITRITE Negative  --    LEUKEST Negative  --    RBCU  --  >100*   WBCU  --  *    CBC RESULTS:   Recent Labs   Lab Test 11/26/24  0933 01/25/24  1341   WBC  --  9.1   RBC  --  4.67   HGB 12.7 13.4   HCT  --  43.1   MCV  --  92   MCH  --  28.7   MCHC  --  31.1*   RDW  --  13.2   PLT  --  285             Signed Electronically by: Rina Cardenas MD

## 2024-12-23 ENCOUNTER — OFFICE VISIT (OUTPATIENT)
Dept: FAMILY MEDICINE | Facility: CLINIC | Age: 84
End: 2024-12-23
Payer: MEDICARE

## 2024-12-23 VITALS
RESPIRATION RATE: 22 BRPM | TEMPERATURE: 99.2 F | BODY MASS INDEX: 27.48 KG/M2 | SYSTOLIC BLOOD PRESSURE: 138 MMHG | OXYGEN SATURATION: 97 % | WEIGHT: 140 LBS | HEART RATE: 86 BPM | HEIGHT: 60 IN | DIASTOLIC BLOOD PRESSURE: 88 MMHG

## 2024-12-23 DIAGNOSIS — M79.675 PAIN OF TOE OF LEFT FOOT: Primary | ICD-10-CM

## 2024-12-23 PROCEDURE — 99213 OFFICE O/P EST LOW 20 MIN: CPT | Performed by: FAMILY MEDICINE

## 2024-12-23 NOTE — PROGRESS NOTES
Assessment & Plan     Pain of toe of left foot  Normal exam today.  Advised with supportive care  Soak with warm water Epsom salt.  Follow-up if needed.    In addition, she does have a previous history of UTI, currently, she has no symptoms.  There is standard  order for UA, to do if start having symptoms.      Grace Hurley is a 84 year old, presenting for the following health issues:  Painful toes    Patient reports been having stiffness in her second left toe, otherwise,  no swelling, no puffiness,  No recent injury.        12/23/2024    10:00 AM   Additional Questions   Roomed by helga hernandez ma     History of Present Illness       Back Pain:  She presents for follow up of back pain. Patient's back pain is a chronic problem.  Location of back pain:  Right lower back and left lower back  Description of back pain: dull ache  Back pain spreads: nowhere    Since patient first noticed back pain, pain is: unchanged  Does back pain interfere with her job:  Not applicable       Reason for visit:  Followup  Symptom onset:  More than a month  Symptoms include:  Feel bad toes hurt tired  Symptom intensity:  Moderate  Symptom progression:  Worsening  Had these symptoms before:  Yes  Has tried/received treatment for these symptoms:  Yes  Previous treatment was successful:  No  What makes it worse:  Walking  What makes it better:  Not really   She is taking medications regularly.         Acute Illness  Acute illness concerns: Follow up from when patient had pneumonia and she does not think her symptoms has resolved  Onset/Duration: this fall  Symptoms:  Fever: No  Chills/Sweats: No  Headache (location?): No  Sinus Pressure: No  Conjunctivitis:  No  Ear Pain: no  Rhinorrhea: YES- but runs down the back of throat  Congestion: No  Sore Throat: No  Cough: YES - due to drainage down throat  Wheeze: No  Decreased Appetite: No  Nausea: No  Vomiting: No  Diarrhea: No  Dysuria/Freq.: No  Dysuria or Hematuria:  No  Fatigue/Achiness: YES- ache started after she got her COVID shot yesterday  Sick/Strep Exposure: No  Therapies tried and outcome: Previous antibiotics; not effective        Review of Systems  Constitutional, HEENT, cardiovascular, pulmonary, gi and gu systems are negative, except as otherwise noted.      Objective    /88 (BP Location: Right arm, Patient Position: Chair, Cuff Size: Adult Regular)   Pulse 86   Temp 99.2  F (37.3  C) (Oral)   Resp 22   Ht 1.524 m (5')   Wt 63.5 kg (140 lb)   SpO2 97%   BMI 27.34 kg/m    Body mass index is 27.34 kg/m .  Physical Exam   GENERAL: alert and no distress  MS: no gross musculoskeletal defects noted, no edema  SKIN: no suspicious lesions or rashes  Normal for toes, left foot toes exam  No sign of infections, no injuries, nail intact  Good capillary refills, normal sensations.          Signed Electronically by: Rina Cardenas MD

## 2025-02-10 ENCOUNTER — TRANSFERRED RECORDS (OUTPATIENT)
Dept: HEALTH INFORMATION MANAGEMENT | Facility: CLINIC | Age: 85
End: 2025-02-10
Payer: MEDICARE

## 2025-03-26 ENCOUNTER — OFFICE VISIT (OUTPATIENT)
Dept: PULMONOLOGY | Facility: CLINIC | Age: 85
End: 2025-03-26
Attending: PHYSICIAN ASSISTANT
Payer: MEDICARE

## 2025-03-26 VITALS
SYSTOLIC BLOOD PRESSURE: 142 MMHG | HEART RATE: 73 BPM | WEIGHT: 140.9 LBS | OXYGEN SATURATION: 98 % | DIASTOLIC BLOOD PRESSURE: 70 MMHG | BODY MASS INDEX: 27.52 KG/M2

## 2025-03-26 DIAGNOSIS — J43.9 PULMONARY EMPHYSEMA, UNSPECIFIED EMPHYSEMA TYPE (H): ICD-10-CM

## 2025-03-26 PROCEDURE — 99215 OFFICE O/P EST HI 40 MIN: CPT | Performed by: INTERNAL MEDICINE

## 2025-03-26 PROCEDURE — G2211 COMPLEX E/M VISIT ADD ON: HCPCS | Performed by: INTERNAL MEDICINE

## 2025-03-26 PROCEDURE — 3078F DIAST BP <80 MM HG: CPT | Performed by: INTERNAL MEDICINE

## 2025-03-26 PROCEDURE — 3077F SYST BP >= 140 MM HG: CPT | Performed by: INTERNAL MEDICINE

## 2025-03-26 RX ORDER — BUDESONIDE, GLYCOPYRROLATE, AND FORMOTEROL FUMARATE 160; 9; 4.8 UG/1; UG/1; UG/1
2 AEROSOL, METERED RESPIRATORY (INHALATION) 2 TIMES DAILY
Qty: 32.1 G | Refills: 3 | Status: SHIPPED | OUTPATIENT
Start: 2025-03-26

## 2025-03-26 RX ORDER — VITAMIN B COMPLEX
25 TABLET ORAL
COMMUNITY

## 2025-03-26 NOTE — NURSING NOTE
Yvonne Maria's goals for this visit include:   Chief Complaint   Patient presents with    Follow Up    COPD       She requests these members of her care team be copied on today's visit information: no     PCP: Rina Cardenas    Referring Provider:  Anastasia Ragsdale PA-C  [address] [place] [address]    BP (!) 142/70 (BP Location: Left arm, Patient Position: Chair, Cuff Size: Adult Regular)   Pulse 73   Wt 63.9 kg (140 lb 14.4 oz)   SpO2 98%   BMI 27.52 kg/m      Do you need any medication refills at today's visit? Yes     YARELI Mosquera   Neph/Pulm Teams  [facility]

## 2025-03-26 NOTE — PROGRESS NOTES
[facility]- Pulmonary Clinic  Follow Up Visit    Name: Yvonne Maria MRN: [id number]     Age: 84 year old   YOB: 1940       Reason for Visit:   Chief Complaint   Patient presents with    Follow Up    COPD             Assessment and Plan:       # Emphysema  History of emphysema on CT scans, PFTs normal. Was up to 1.5 ppd x 27 years, quit 1986. Subjectively benefits from triple inhaler therapy, continue.       # GERD   Continue pepcid    # Healthcare maintenance  Immunizations: UTD flu, COVID booster, pneumonia and RSV      Return to clinic in 1 year or sooner if needed        I spent 40 minutes on the date of the encounter doing chart review, history and exam, documentation and further coordination as noted above exclusive of time interpreting PFT, Chest Xray, CT Chest.     Quique Linares MD  Pulmonary, Critical Care and Sleep Medicine  [facility]  Pager: [phone]              HPI:   Yvonne Maria is an 84 year old female with history of CAD, osteoporosis, CKD, and COPD who presents for annual follow up of COPD. Last seen in clinic with Anastasia Ragsdale PA-C in 3/2024. Maintained on Breztri.  Since last clinic visit she has mostly been stable, no AECOPD. No other symptoms. She had neck surgery 2/6/24 and has fully recovered and is off neck brace for several months. Has history of seasonal allergies for the past couple of years and was prescribed Flonase which she doesn't use often. Saw an allergist years ago, is allergic to cats and dogs. She has 2 dogs that don't bother her. No chest tightness or wheeze. Pepcid twice daily works well for her.     Using Breztri daily, which does seem to help. Uses an OTC antihistamine once in a while.     10 point ROS performed and negative except for as noted in HPI.    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 No family history of lung cancer or chronic lung disease. She has a 2 dogs. Lives in a house which was  built in , no flooding issues. There are some molds in the house but she has not been exposed to. Was up to 1.5 ppd x 27 years, quit            Past Medical History:     Past Medical History:   Diagnosis Date    Abdominal aortic aneurysm (AAA)     Arthritis     osteo    Cancer (H)     skin cancer    COPD (chronic obstructive pulmonary disease) (H)     Heart disease     Hypertension     PONV (postoperative nausea and vomiting)     Spinal stenosis in cervical region 2022    Thyroid disease              Past Surgical History:      Past Surgical History:   Procedure Laterality Date    APPENDECTOMY      BACK SURGERY      CERVICAL FUSION  2024    C2 , Posterior    CHOLECYSTECTOMY      CV CORONARY ANGIOGRAM N/A 2021    Procedure: CV CORONARY ANGIOGRAM;  Surgeon: Moses Styles MD;  Location:  HEART CARDI[facility] CATH LAB    ENT SURGERY      tonsils    EYE SURGERY      cataracts    GI SURGERY      hiatel hernia repair             Social History:     Social History     Socioeconomic History    Marital status:      Spouse name: Not on file    Number of children: Not on file    Years of education: Not on file    Highest education level: Not on file   Occupational History    Not on file   Tobacco Use    Smoking status: Former     Current packs/day: 0.00     Average packs/day: 1 pack/day for 27.0 years (27.0 ttl pk-yrs)     Types: Cigarettes     Start date: 3/28/1959     Quit date: 3/28/1986     Years since quittin.0     Passive exposure: Past    Smokeless tobacco: Never   Vaping Use    Vaping status: Never Used   Substance and Sexual Activity    Alcohol use: Yes     Alcohol/week: 0.0 standard drinks of alcohol     Comment: Wine 5-7 glasses per week    Drug use: No    Sexual activity: Not Currently     Partners: Male   Other Topics Concern    Parent/sibling w/ CABG, MI or angioplasty before 65F 55M? No   Social History Narrative    Not on file     Social Drivers of Health     Financial  Resource Strain: Low Risk  (9/26/2024)    Financial Resource Strain     Within the past 12 months, have you or your family members you live with been unable to get utilities (heat, electricity) when it was really needed?: No   Food Insecurity: Low Risk  (9/26/2024)    Food Insecurity     Within the past 12 months, did you worry that your food would run out before you got money to buy more?: No     Within the past 12 months, did the food you bought just not last and you didn t have money to get more?: No   Transportation Needs: Low Risk  (9/26/2024)    Transportation Needs     Within the past 12 months, has lack of transportation kept you from medical appointments, getting your medicines, non-medical meetings or appointments, work, or from getting things that you need?: No   Physical Activity: Inactive (9/26/2024)    Exercise Vital Sign     Days of Exercise per Week: 0 days     Minutes of Exercise per Session: 0 min   Stress: No Stress Concern Present (9/26/2024)    [place] [employer] - Occupational Stress Questionnaire     Feeling of Stress : Not at all   Social Connections: Unknown (9/26/2024)    Social Connection and Isolation Panel [NHANES]     Frequency of Communication with Friends and Family: Not on file     Frequency of Social Gatherings with Friends and Family: More than three times a week     Attends Yarsani Services: Not on file     Active Member of Clubs or Organizations: Not on file     Attends Club or Organization Meetings: Not on file     Marital Status: Not on file   Interpersonal Safety: Low Risk  (9/26/2024)    Interpersonal Safety     Do you feel physically and emotionally safe where you currently live?: Yes     Within the past 12 months, have you been hit, slapped, kicked or otherwise physically hurt by someone?: No     Within the past 12 months, have you been humiliated or emotionally abused in other ways by your partner or ex-partner?: No   Housing Stability: Low Risk   (9/26/2024)    Housing Stability     Do you have housing? : Yes     Are you worried about losing your housing?: No            Family History:     Family History   Problem Relation Age of Onset    Myocardial Infarction Father     Heart Disease Maternal Grandmother     Lymphoma Maternal Grandfather     Heart Disease Paternal Grandmother     Myocardial Infarction Son              Allergies:     Allergies   Allergen Reactions    Gluten Meal Diarrhea     Celiac disease    Other Reaction(s): celiac disease    Pregabalin Swelling     Other reaction(s): Edema    Swelling legs and hands    Other Reaction(s): Feet Swelling    Other Reaction(s): swelling, hands and legs, Unknown    Tetracyclines & Related      Other reaction(s): *Unknown, Dermatitis  Other reaction(s): rash  PN: LW Reaction: rash  questionable allergy  questionable allergy      Wheat Itching    Allopurinol Itching     Other Reaction(s): itching             Medications:     Current Outpatient Medications   Medication Sig Dispense Refill    acetaminophen (TYLENOL) 500 MG tablet Take 1,000 mg by mouth every 6 hours      amLODIPine (NORVASC) 5 MG tablet Take 1 tablet (5 mg) by mouth daily. 90 tablet 3    budeson-glycopyrrol-formoterol (BREZTRI AEROSPHERE) 160-9-4.8 MCG/ACT AERO inhaler Inhale 2 puffs into the lungs 2 times daily 32.1 g 3    cyanocobalamin (CYANOCOBALAMIN) 1000 MCG/ML injection Inject 1 mL (1,000 mcg) into the muscle every 30 days 1 mL 11    famotidine (PEPCID) 40 MG tablet Take 1 tablet (40 mg) by mouth 2 times daily as needed for heartburn 180 tablet 3    levothyroxine (SYNTHROID/LEVOTHROID) 88 MCG tablet Take 1 tablet (88 mcg) by mouth daily. 90 tablet 3    traMADol (ULTRAM) 50 MG tablet Take 1 tablet (50 mg) by mouth 2 times daily as needed for severe pain. TAKE ONE TABLET BY MOUTH TWICE DAILY AS NEEDED 60 tablet 4    atorvastatin (LIPITOR) 10 MG tablet Take 1 tablet (10 mg) by mouth daily. (Patient not taking: Reported on 11/26/2024) 90 tablet  3    fluticasone (FLONASE) 50 MCG/ACT nasal spray Spray 1 spray into both nostrils daily (Patient not taking: Reported on 3/26/2025) 15.8 mL 11    methocarbamol (ROBAXIN) 500 MG tablet Take 1 tablet (500 mg) by mouth 3 times daily as needed for muscle spasms. (Patient not taking: Reported on 3/26/2025) 90 tablet 4    Vitamin D3 (VITAMIN D-1000 MAX ST) 25 mcg (1000 units) tablet Take 25 mcg by mouth. (Patient not taking: Reported on 3/26/2025)       No current facility-administered medications for this visit.              Exam:   BP (!) 142/70 (BP Location: Left arm, Patient Position: Chair, Cuff Size: Adult Regular)   Pulse 73   Wt 63.9 kg (140 lb 14.4 oz)   SpO2 98%   BMI 27.52 kg/m    Exam:  Constitutional: healthy, alert, and no distress  Head: Normocephalic. No masses, lesions, tenderness or abnormalities  ENT: ENT exam normal, no neck nodes or sinus tenderness  Cardiovascular: negative, PMI normal. No lifts, heaves, or thrills. RRR. No murmurs, clicks gallops or rub  Respiratory: negative, Percussion normal. Good diaphragmatic excursion. Lungs clear  Gastrointestinal: Abdomen soft, non-tender. BS normal. No masses, organomegaly  : Deferred  Musculoskeletal: extremities normal- no gross deformities noted, gait normal, and normal muscle tone  Skin: no suspicious lesions or rashes  Neurologic: Gait normal. Reflexes normal and symmetric. Sensation grossly WNL.  Psychiatric: mentation appears normal and affect normal/bright  Hematologic/Lymphatic/Immunologic: Normal cervical lymph nodes            Data:     I have personally reviewed all pertinent labs, imaging studies and PFT results unless otherwise noted.    Labs:    Imaging:    PFT:

## 2025-04-03 DIAGNOSIS — M47.812 OSTEOARTHRITIS OF CERVICAL SPINE, UNSPECIFIED SPINAL OSTEOARTHRITIS COMPLICATION STATUS: ICD-10-CM

## 2025-04-03 DIAGNOSIS — M81.0 OSTEOPOROSIS WITHOUT CURRENT PATHOLOGICAL FRACTURE, UNSPECIFIED OSTEOPOROSIS TYPE: ICD-10-CM

## 2025-04-03 DIAGNOSIS — M48.02 SPINAL STENOSIS IN CERVICAL REGION: ICD-10-CM

## 2025-04-03 DIAGNOSIS — G89.29 CHRONIC MIDLINE LOW BACK PAIN WITHOUT SCIATICA: ICD-10-CM

## 2025-04-03 DIAGNOSIS — M54.50 CHRONIC MIDLINE LOW BACK PAIN WITHOUT SCIATICA: ICD-10-CM

## 2025-04-03 RX ORDER — TRAMADOL HYDROCHLORIDE 50 MG/1
50 TABLET ORAL 2 TIMES DAILY PRN
Qty: 60 TABLET | Refills: 2 | Status: SHIPPED | OUTPATIENT
Start: 2025-04-03

## 2025-04-07 ENCOUNTER — OFFICE VISIT (OUTPATIENT)
Dept: FAMILY MEDICINE | Facility: CLINIC | Age: 85
End: 2025-04-07
Payer: COMMERCIAL

## 2025-04-07 VITALS
HEIGHT: 60 IN | RESPIRATION RATE: 18 BRPM | WEIGHT: 142.2 LBS | TEMPERATURE: 97.9 F | SYSTOLIC BLOOD PRESSURE: 138 MMHG | BODY MASS INDEX: 27.92 KG/M2 | OXYGEN SATURATION: 99 % | HEART RATE: 72 BPM | DIASTOLIC BLOOD PRESSURE: 82 MMHG

## 2025-04-07 DIAGNOSIS — R73.9 ELEVATED BLOOD SUGAR: ICD-10-CM

## 2025-04-07 DIAGNOSIS — Z12.31 ENCOUNTER FOR SCREENING MAMMOGRAM FOR BREAST CANCER: ICD-10-CM

## 2025-04-07 DIAGNOSIS — N18.32 STAGE 3B CHRONIC KIDNEY DISEASE (H): Primary | ICD-10-CM

## 2025-04-07 DIAGNOSIS — R53.83 OTHER FATIGUE: ICD-10-CM

## 2025-04-07 LAB
ALBUMIN SERPL BCG-MCNC: 4.4 G/DL (ref 3.5–5.2)
ALBUMIN UR-MCNC: NEGATIVE MG/DL
ALP SERPL-CCNC: 109 U/L (ref 40–150)
ALT SERPL W P-5'-P-CCNC: 17 U/L (ref 0–50)
ANION GAP SERPL CALCULATED.3IONS-SCNC: 13 MMOL/L (ref 7–15)
APPEARANCE UR: CLEAR
AST SERPL W P-5'-P-CCNC: 31 U/L (ref 0–45)
BACTERIA #/AREA URNS HPF: ABNORMAL /HPF
BASOPHILS # BLD AUTO: 0 10E3/UL (ref 0–0.2)
BASOPHILS NFR BLD AUTO: 0 %
BILIRUB SERPL-MCNC: 0.3 MG/DL
BILIRUB UR QL STRIP: NEGATIVE
BUN SERPL-MCNC: 16.3 MG/DL (ref 8–23)
CALCIUM SERPL-MCNC: 10.2 MG/DL (ref 8.8–10.4)
CHLORIDE SERPL-SCNC: 101 MMOL/L (ref 98–107)
COLOR UR AUTO: YELLOW
CREAT SERPL-MCNC: 1.24 MG/DL (ref 0.51–0.95)
EGFRCR SERPLBLD CKD-EPI 2021: 43 ML/MIN/1.73M2
EOSINOPHIL # BLD AUTO: 0.2 10E3/UL (ref 0–0.7)
EOSINOPHIL NFR BLD AUTO: 2 %
ERYTHROCYTE [DISTWIDTH] IN BLOOD BY AUTOMATED COUNT: 13.7 % (ref 10–15)
EST. AVERAGE GLUCOSE BLD GHB EST-MCNC: 120 MG/DL
GLUCOSE SERPL-MCNC: 106 MG/DL (ref 70–99)
GLUCOSE UR STRIP-MCNC: NEGATIVE MG/DL
HBA1C MFR BLD: 5.8 % (ref 0–5.6)
HCO3 SERPL-SCNC: 25 MMOL/L (ref 22–29)
HCT VFR BLD AUTO: 42.8 % (ref 35–47)
HGB BLD-MCNC: 13.7 G/DL (ref 11.7–15.7)
HGB UR QL STRIP: ABNORMAL
IMM GRANULOCYTES # BLD: 0 10E3/UL
IMM GRANULOCYTES NFR BLD: 0 %
KETONES UR STRIP-MCNC: NEGATIVE MG/DL
LEUKOCYTE ESTERASE UR QL STRIP: NEGATIVE
LYMPHOCYTES # BLD AUTO: 1.6 10E3/UL (ref 0.8–5.3)
LYMPHOCYTES NFR BLD AUTO: 19 %
MCH RBC QN AUTO: 29 PG (ref 26.5–33)
MCHC RBC AUTO-ENTMCNC: 32 G/DL (ref 31.5–36.5)
MCV RBC AUTO: 91 FL (ref 78–100)
MONOCYTES # BLD AUTO: 0.9 10E3/UL (ref 0–1.3)
MONOCYTES NFR BLD AUTO: 10 %
NEUTROPHILS # BLD AUTO: 6.1 10E3/UL (ref 1.6–8.3)
NEUTROPHILS NFR BLD AUTO: 69 %
NITRATE UR QL: NEGATIVE
PH UR STRIP: 5.5 [PH] (ref 5–7)
PLATELET # BLD AUTO: 299 10E3/UL (ref 150–450)
POTASSIUM SERPL-SCNC: 4.6 MMOL/L (ref 3.4–5.3)
PROT SERPL-MCNC: 7.4 G/DL (ref 6.4–8.3)
RBC # BLD AUTO: 4.72 10E6/UL (ref 3.8–5.2)
RBC #/AREA URNS AUTO: ABNORMAL /HPF
SODIUM SERPL-SCNC: 139 MMOL/L (ref 135–145)
SP GR UR STRIP: <=1.005 (ref 1–1.03)
SQUAMOUS #/AREA URNS AUTO: ABNORMAL /LPF
TSH SERPL DL<=0.005 MIU/L-ACNC: 0.67 UIU/ML (ref 0.3–4.2)
UROBILINOGEN UR STRIP-ACNC: 0.2 E.U./DL
VIT B12 SERPL-MCNC: 792 PG/ML (ref 232–1245)
WBC # BLD AUTO: 8.8 10E3/UL (ref 4–11)
WBC #/AREA URNS AUTO: ABNORMAL /HPF

## 2025-04-07 PROCEDURE — 3075F SYST BP GE 130 - 139MM HG: CPT | Performed by: FAMILY MEDICINE

## 2025-04-07 PROCEDURE — 81001 URINALYSIS AUTO W/SCOPE: CPT | Performed by: FAMILY MEDICINE

## 2025-04-07 PROCEDURE — 80053 COMPREHEN METABOLIC PANEL: CPT | Performed by: FAMILY MEDICINE

## 2025-04-07 PROCEDURE — 82607 VITAMIN B-12: CPT | Performed by: FAMILY MEDICINE

## 2025-04-07 PROCEDURE — 85025 COMPLETE CBC W/AUTO DIFF WBC: CPT | Performed by: FAMILY MEDICINE

## 2025-04-07 PROCEDURE — 99214 OFFICE O/P EST MOD 30 MIN: CPT | Performed by: FAMILY MEDICINE

## 2025-04-07 PROCEDURE — 3078F DIAST BP <80 MM HG: CPT | Performed by: FAMILY MEDICINE

## 2025-04-07 PROCEDURE — 36415 COLL VENOUS BLD VENIPUNCTURE: CPT | Performed by: FAMILY MEDICINE

## 2025-04-07 PROCEDURE — 83036 HEMOGLOBIN GLYCOSYLATED A1C: CPT | Performed by: FAMILY MEDICINE

## 2025-04-07 PROCEDURE — 84443 ASSAY THYROID STIM HORMONE: CPT | Performed by: FAMILY MEDICINE

## 2025-04-07 NOTE — PROGRESS NOTES
Assessment & Plan     Stage 3b chronic kidney disease (H)  Kidney functions remain stable  Recently, seen by Nephrology.  - Comprehensive metabolic panel (BMP + Alb, Alk Phos, ALT, AST, Total. Bili, TP); Future  - Comprehensive metabolic panel (BMP + Alb, Alk Phos, ALT, AST, Total. Bili, TP)    Other fatigue  Negative for UA  Normal for CBC    - CBC with platelets and differential; Future  - Comprehensive metabolic panel (BMP + Alb, Alk Phos, ALT, AST, Total. Bili, TP); Future  - TSH with free T4 reflex; Future  - UA Macroscopic with reflex to Microscopic and Culture - Lab Collect; Future  - Vitamin B12; Future  - CBC with platelets and differential  - Comprehensive metabolic panel (BMP + Alb, Alk Phos, ALT, AST, Total. Bili, TP)  - TSH with free T4 reflex  - UA Macroscopic with reflex to Microscopic and Culture - Lab Collect  - Vitamin B12  - UA Microscopic with Reflex to Culture    Elevated blood sugar  A1c at 5.8  Pre diabetes,    - Hemoglobin A1c; Future  - Hemoglobin A1c    Encounter for screening mammogram for breast cancer  Screening, pt requested.  - MA Screen Bilateral w/Truman; Future      Subjective   Mei is a 84 year old, presenting for the following health issues:    History of hypertension, chronic kidney disease stage III, kidney function been stable she has been seen by nephrology.  Blood pressure is well-controlled.    She reports for the past few weeks she has been feeling more fatigued, more tired, no change in her weight, she has no fever no chills.  No abdominal pain no nausea no vomiting.  No blood in urine or stool.    She denies cough, denies sore throat, denies chest pain, denies short of breath.    She has no urgency, no frequency.  Patient reports she has follow vegetarian diet.          4/7/2025     2:00 PM   Additional Questions   Roomed by helga hernandez ma     History of Present Illness       Reason for visit:  Bloodwork (glucose) and is fasting   She is taking medications regularly.     "    Right breast hurts for about a month now. No known injury and there is no lumps.    Patient is wondering what long COVID is. She feels as if \"something is wrong\" such as aching, fatigue, abdomen is bothering her.          Review of Systems  Constitutional, HEENT, cardiovascular, pulmonary, GI, , musculoskeletal, neuro, skin, endocrine and psych systems are negative, except as otherwise noted.      Objective    BP (!) 186/78 (BP Location: Right arm, Patient Position: Sitting, Cuff Size: Adult Regular)   Pulse 72   Temp 97.9  F (36.6  C) (Oral)   Resp 18   Ht 1.524 m (5')   Wt 64.5 kg (142 lb 3.2 oz)   SpO2 99%   BMI 27.77 kg/m    Body mass index is 27.77 kg/m .  Physical Exam   GENERAL: alert and no distress  HENT: ear canals and TM's normal, nose and mouth without ulcers or lesions  NECK: no adenopathy, no asymmetry, masses, or scars  RESP: lungs clear to auscultation - no rales, rhonchi or wheezes  CV: regular rate and rhythm, normal S1 S2, no S3 or S4, no murmur, click or rub, no peripheral edema  ABDOMEN: soft, nontender, no hepatosplenomegaly, no masses and bowel sounds normal  MS: no gross musculoskeletal defects noted, no edema  BACK: no CVA tenderness, no paralumbar tenderness  PSYCH: mentation appears normal, affect normal/bright    Orders Placed This Encounter   Procedures    REVIEW OF HEALTH MAINTENANCE PROTOCOL ORDERS    MA Screen Bilateral w/Truman    Comprehensive metabolic panel (BMP + Alb, Alk Phos, ALT, AST, Total. Bili, TP)    TSH with free T4 reflex    UA Macroscopic with reflex to Microscopic and Culture - Lab Collect    Hemoglobin A1c    Vitamin B12    CBC with platelets and differential    UA Microscopic with Reflex to Culture    CBC with platelets and differential      Lab Results   Component Value Date    A1C 5.8 04/07/2025     UA RESULTS:  Recent Labs   Lab Test 04/07/25  1440   COLOR Yellow   APPEARANCE Clear   URINEGLC Negative   URINEBILI Negative   URINEKETONE Negative   SG " <=1.005   UBLD Trace*   URINEPH 5.5   PROTEIN Negative   UROBILINOGEN 0.2   NITRITE Negative   LEUKEST Negative   RBCU None Seen   WBCU 0-5             Signed Electronically by: Rina Cardenas MD

## 2025-04-08 ENCOUNTER — PATIENT OUTREACH (OUTPATIENT)
Dept: CARE COORDINATION | Facility: CLINIC | Age: 85
End: 2025-04-08
Payer: MEDICARE

## 2025-04-08 ENCOUNTER — TELEPHONE (OUTPATIENT)
Dept: FAMILY MEDICINE | Facility: CLINIC | Age: 85
End: 2025-04-08
Payer: MEDICARE

## 2025-04-08 NOTE — TELEPHONE ENCOUNTER
"Called and left message for patient to return call to clinic      TOYIN Berman    Triage Nurse  [facility]          ----- Message from Rina Cardenas sent at 4/8/2025  8:39 AM CDT -----  Please mail a letter with results    Hello -    Here are my comments about your recent results:    Negative for Urine  Normal for TSH, thyroid function  Continue with current thyroid medicine  Normal for vitamin B12    Normal for CBC, blood count    Kidney function is stable    A1c at 5.8 ( three months of blood sugar ) this is pre diabetes  Please continue to work on your diet and increase physical activities.    Thanks and have a good day    Ways to improve your cholesterol...     1- Eats less saturated fats (including avoiding \"trans\" fats), fatty foods.  Eat more baked food, than fried food.     2 - Eat more unsaturated fats  - found in vegetables, grains, and tree nuts.  Also by replacing butter with canola oil or olive oil.     3 - Eat more nuts.  1-2 ounces (a small handful) of almonds, walnuts, hazelnuts or pecans once a day in place of other less healthy snacks.     4 - Eat more high fiber foods - vegetables and whole grains including oat bran, oats, beans, peas, and flax seed.     5 - Eat more fish - such as salmon, tuna, mackerel, and sardines.  1 or 2 six ounce servings per week is a healthy replacement for other proteins.     6 - Exercise for at least 120 minutes per week - which is equal to 30 minutes 4 days per week.    7- avoid late meals, at least  a few hours before bedtime.       Please let us know if you have any questions or concerns.      Regards,  Rina Cardenas MD  "

## 2025-04-08 NOTE — TELEPHONE ENCOUNTER
Patient returned call. RN relayed provider's message. Patient verbalized understanding.     Jessica RIOS RN  [facility]

## 2025-04-08 NOTE — PROGRESS NOTES
Assessment & Plan     Cellulitis of right lower extremity  Resolved, completed a course of Keflex, no more redness, pain or swelling.  Reviewed, ER visit notes, labs and imaging.  US venous lower ext doppler, negative for DVT    Gout, unspecified cause, unspecified chronicity, unspecified site  Last uric acid was elevated,  Recent flare up  Re started Allopurinol at 1/2 tab ( 50 mg ), daily . Renal dosed.  - Uric acid; Future  - allopurinol (ZYLOPRIM) 100 MG tablet; 1/2 tab daily ( 50 mg ) daily    Hypothyroidism, unspecified type  Continue with Levothyroxine  - TSH with free T4 reflex; Future    Hypertension, essential  Stable, continue with Amlodipine.  BMP was done on 01/26/2023    Mixed hyperlipidemia  Can't take statin.  - Lipid panel reflex to direct LDL Fasting; Future    PMR (polymyalgia rheumatica) (H)  Stable, no recent flare up    Stage 3a chronic kidney disease (H)  Stable. Repeat labs in a few months.      Grace Hurley is a 82 year old presenting for the following health issues:  ER F/U  History of gout, chronic kidney disease stage III.  She was seen at the ER on January 26, 2023.  For right leg swollen, tenderness.  She was diagnosed with cellulitis.  She was treated with Keflex.  She had completed a course of Keflex, her symptoms been completely resolved.    She has no more pain, no swelling.  She has no known injury or trauma.  Negative Doppler for DVT    2 days prior to that on January 24, 2023 she was seen at the urgent care.    History of Present Illness       Reason for visit:  Followup to er visit    She eats 2-3 servings of fruits and vegetables daily.She consumes 0 sweetened beverage(s) daily.She exercises with enough effort to increase her heart rate 10 to 19 minutes per day.  She exercises with enough effort to increase her heart rate 3 or less days per week.   She is taking medications regularly.       ED/UC Followup:    Facility:  [facility]  Date of visit:  "01/26/23  Reason for visit: Cellulitis of right lower extremity  Current Status: feeling better    Review of Systems   Constitutional, HEENT, cardiovascular, pulmonary, GI, , musculoskeletal, neuro, skin, endocrine and psych systems are negative, except as otherwise noted.      Objective    Pulse 80   Temp 97.1  F (36.2  C) (Tympanic)   Resp 20   Ht 1.537 m (5' 0.5\")   Wt 64 kg (141 lb)   SpO2 99%   BMI 27.08 kg/m    Body mass index is 27.08 kg/m .  Physical Exam   GENERAL: healthy, alert and no distress  RESP: lungs clear to auscultation - no rales, rhonchi or wheezes  CV: regular rate and rhythm, normal S1 S2, no S3 or S4, no murmur, click or rub, no peripheral edema and peripheral pulses strong  ABDOMEN: soft, nontender, no hepatosplenomegaly, no masses and bowel sounds normal  MS: no gross musculoskeletal defects noted, no edema  SKIN: no suspicious lesions or rashes    Orders Placed This Encounter   Procedures     TSH with free T4 reflex     Lipid panel reflex to direct LDL Fasting     Uric acid       Rina Cardenas MD            " No/Not applicable

## 2025-04-11 DIAGNOSIS — K21.9 GASTROESOPHAGEAL REFLUX DISEASE WITHOUT ESOPHAGITIS: ICD-10-CM

## 2025-04-12 RX ORDER — FAMOTIDINE 40 MG/1
40 TABLET, FILM COATED ORAL 2 TIMES DAILY PRN
Qty: 180 TABLET | Refills: 1 | Status: SHIPPED | OUTPATIENT
Start: 2025-04-12

## 2025-04-15 ENCOUNTER — ANCILLARY PROCEDURE (OUTPATIENT)
Dept: MAMMOGRAPHY | Facility: CLINIC | Age: 85
End: 2025-04-15
Attending: FAMILY MEDICINE
Payer: MEDICARE

## 2025-04-15 DIAGNOSIS — Z12.31 ENCOUNTER FOR SCREENING MAMMOGRAM FOR BREAST CANCER: ICD-10-CM

## 2025-04-15 PROCEDURE — 77067 SCR MAMMO BI INCL CAD: CPT | Mod: TC | Performed by: RADIOLOGY

## 2025-05-14 ENCOUNTER — TELEPHONE (OUTPATIENT)
Dept: FAMILY MEDICINE | Facility: CLINIC | Age: 85
End: 2025-05-14
Payer: MEDICARE

## 2025-05-14 NOTE — TELEPHONE ENCOUNTER
Patient Quality Outreach    Patient is due for the following:   Diabetes -  Microalbumin  Chronic Opioid Use -  Treatment Agreement (CSA), Urine Drug Screen, GOSIA-7, and PHQ-9    Action(s) Taken:   Schedule a office visit for Controled Substance Check In     Type of outreach:    Sent [facility] message.    Questions for provider review:    None         Fannie Pozo [facility]  Chart routed to Care Team.

## 2025-05-27 ENCOUNTER — OFFICE VISIT (OUTPATIENT)
Dept: FAMILY MEDICINE | Facility: CLINIC | Age: 85
End: 2025-05-27
Payer: MEDICARE

## 2025-05-27 VITALS
BODY MASS INDEX: 27.88 KG/M2 | TEMPERATURE: 97.8 F | WEIGHT: 142 LBS | OXYGEN SATURATION: 99 % | HEART RATE: 84 BPM | RESPIRATION RATE: 20 BRPM | SYSTOLIC BLOOD PRESSURE: 130 MMHG | DIASTOLIC BLOOD PRESSURE: 62 MMHG | HEIGHT: 60 IN

## 2025-05-27 DIAGNOSIS — M81.0 OSTEOPOROSIS WITHOUT CURRENT PATHOLOGICAL FRACTURE, UNSPECIFIED OSTEOPOROSIS TYPE: ICD-10-CM

## 2025-05-27 DIAGNOSIS — M47.812 OSTEOARTHRITIS OF CERVICAL SPINE, UNSPECIFIED SPINAL OSTEOARTHRITIS COMPLICATION STATUS: ICD-10-CM

## 2025-05-27 DIAGNOSIS — G89.29 CHRONIC MIDLINE LOW BACK PAIN WITHOUT SCIATICA: ICD-10-CM

## 2025-05-27 DIAGNOSIS — M54.50 CHRONIC MIDLINE LOW BACK PAIN WITHOUT SCIATICA: ICD-10-CM

## 2025-05-27 DIAGNOSIS — R23.3 ABNORMAL BRUISING: ICD-10-CM

## 2025-05-27 DIAGNOSIS — K21.9 GASTROESOPHAGEAL REFLUX DISEASE WITHOUT ESOPHAGITIS: ICD-10-CM

## 2025-05-27 DIAGNOSIS — N18.32 STAGE 3B CHRONIC KIDNEY DISEASE (H): ICD-10-CM

## 2025-05-27 DIAGNOSIS — M77.11 LATERAL EPICONDYLITIS, RIGHT ELBOW: Primary | ICD-10-CM

## 2025-05-27 PROCEDURE — 3078F DIAST BP <80 MM HG: CPT | Performed by: FAMILY MEDICINE

## 2025-05-27 PROCEDURE — 3075F SYST BP GE 130 - 139MM HG: CPT | Performed by: FAMILY MEDICINE

## 2025-05-27 PROCEDURE — 1125F AMNT PAIN NOTED PAIN PRSNT: CPT | Performed by: FAMILY MEDICINE

## 2025-05-27 PROCEDURE — 99214 OFFICE O/P EST MOD 30 MIN: CPT | Performed by: FAMILY MEDICINE

## 2025-05-27 RX ORDER — TRAMADOL HYDROCHLORIDE 50 MG/1
50 TABLET ORAL 2 TIMES DAILY PRN
Qty: 60 TABLET | Refills: 5 | Status: SHIPPED | OUTPATIENT
Start: 2025-06-16

## 2025-05-27 ASSESSMENT — PAIN SCALES - GENERAL: PAINLEVEL_OUTOF10: MODERATE PAIN (5)

## 2025-05-27 NOTE — PROGRESS NOTES
Assessment & Plan     Lateral epicondylitis, right elbow  Apply ice, home daily stretches and exercises,   Avoid over using.  May wear elbow brace.  Continue with current medicines    Abnormal bruising  Reviewed previous labs which include CBC, hepatic function panel.  Patient is not taking any medications that could cause any bruising.  She denies any injuries or trauma.  Discussed with patient could be related to supplements I advised her to stop all her supplements for 4 weeks and see if that will improve the bruising.  Otherwise, she does not have any active bleeding    Gastroesophageal reflux disease without esophagitis  Diet modifications    - cimetidine (TAGAMET) 200 MG tablet; Take 1 tablet (200 mg) by mouth 3 times daily as needed.    Osteoporosis without current pathological fracture, unspecified osteoporosis type    - traMADol (ULTRAM) 50 MG tablet; Take 1 tablet (50 mg) by mouth 2 times daily as needed for severe pain.    Osteoarthritis of cervical spine, unspecified spinal osteoarthritis complication status    - traMADol (ULTRAM) 50 MG tablet; Take 1 tablet (50 mg) by mouth 2 times daily as needed for severe pain.    Stage 3b chronic kidney disease (H)  Stable,     Chronic midline low back pain without sciatica    - traMADol (ULTRAM) 50 MG tablet; Take 1 tablet (50 mg) by mouth 2 times daily as needed for severe pain.           [place] Prescription Monitoring Program, ( ) referenced. No indication of misuse, or abuse.      BMI  Estimated body mass index is 27.73 kg/m  as calculated from the following:    Height as of this encounter: 1.524 m (5').    Weight as of this encounter: 64.4 kg (142 lb).   Weight management plan: Discussed healthy diet and exercise guidelines      Follow-up    Follow-up Visit   Expected date:  Sep 27, 2025 (Approximate)      Follow Up Appointment Details:     Follow-up with whom?: PCP    Follow-Up for what?: Medicare Wellness    Welcome or Annual?: Annual Wellness     How?: In Person                 Grace Hurley is a 84 year old, presenting for the following health issues:  Pain Management    Patient with history of osteoporosis, chronic neck pain, osteoarthritis of the cervical spine, lower back chronically on tramadol as needed.    Otherwise, she has no history of abuse, no history of depression.  She has been having stiffness in her right elbow area. No weakness  No numbness or tingling in hand or fingers.        5/27/2025     2:40 PM   Additional Questions   Roomed by helga hernandez ma     History of Present Illness       Reason for visit:  Followup to prior visit   She is taking medications regularly.        Discuss random bruises/discoloration. She has had it for a while. What is new is it is spreading up her arms. She was told she has thin skin in the past.    Pain History:  When did you first notice your pain? chronic   Have you seen this provider for your pain in the past? Yes   Where in your body do you have pain? elbow  Are you seeing anyone else for your pain? No          9/11/2023     1:02 PM 9/26/2024     8:15 AM   GOSIA-7 SCORE   Total Score 0 (minimal anxiety) 0 (minimal anxiety)   Total Score 0 0       PDMP Review         Value Time User    State PDMP site checked  Yes 4/7/2025  2:26 PM Rina Cardenas MD          Last CSA Agreement:   CSA -- Patient Level:     [Media Unavailable] Controlled Substance Agreement - Opioid - Scan on 5/27/2022  7:19 PM             Review of Systems  Constitutional, HEENT, cardiovascular, pulmonary, GI, , musculoskeletal, neuro, skin, endocrine and psych systems are negative, except as otherwise noted.      Objective    BP (!) 152/76 (BP Location: Right arm, Patient Position: Sitting, Cuff Size: Adult Regular)   Pulse 84   Temp 97.8  F (36.6  C) (Oral)   Resp 20   Ht 1.524 m (5')   Wt 64.4 kg (142 lb)   SpO2 99%   BMI 27.73 kg/m    Body mass index is 27.73 kg/m .  Physical Exam   GENERAL: alert and no distress  MS: right  elbow exam: No swelling, no signs of injury, no bruising.  Minimal tenderness to the lateral aspect with deep palpitation.  Full range of motion, full strength, no weakness.  SKIN: ecchymoses - generalized  PSYCH: mentation appears normal, affect normal/bright    No orders of the defined types were placed in this encounter.   CBC RESULTS:   Recent Labs   Lab Test 04/07/25  1436   WBC 8.8   RBC 4.72   HGB 13.7   HCT 42.8   MCV 91   MCH 29.0   MCHC 32.0   RDW 13.7                Signed Electronically by: Rina Cardenas MD

## 2025-06-18 ENCOUNTER — MEDICAL CORRESPONDENCE (OUTPATIENT)
Dept: HEALTH INFORMATION MANAGEMENT | Facility: CLINIC | Age: 85
End: 2025-06-18

## 2025-06-18 ENCOUNTER — TELEPHONE (OUTPATIENT)
Dept: PULMONOLOGY | Facility: CLINIC | Age: 85
End: 2025-06-18
Payer: MEDICARE

## 2025-06-18 NOTE — TELEPHONE ENCOUNTER
Updated O2 orders have been faxed to [facility] to F: [phone] for O2 renewal.    Renay Francisco LPN  Pulmonary Medicine:  [facility]  Phone: [phone] Fax: [phone]

## 2025-07-09 ENCOUNTER — TELEPHONE (OUTPATIENT)
Dept: FAMILY MEDICINE | Facility: CLINIC | Age: 85
End: 2025-07-09
Payer: MEDICARE

## 2025-07-09 NOTE — TELEPHONE ENCOUNTER
Reason for Call:  Appointment Request    Patient requesting this type of appt:  illness    Requested provider: Rina Cardenas    Reason patient unable to be scheduled: Not within requested timeframe    When does patient want to be seen/preferred time: asap    Comments:   Patient was told she may have lupus or something else from her Derm but she wants a second opinion      Could we send this information to you in [facility] or would you prefer to receive a phone call?:   Patient would prefer a phone call   Okay to leave a detailed message?: Yes at Cell number on file:    Telephone Information:   Mobile [phone]       Call taken on 7/9/2025 at 3:04 PM by Yelitza Kim

## 2025-08-11 ENCOUNTER — TELEPHONE (OUTPATIENT)
Dept: FAMILY MEDICINE | Facility: CLINIC | Age: 85
End: 2025-08-11
Payer: MEDICARE

## 2025-08-13 ENCOUNTER — DOCUMENTATION ONLY (OUTPATIENT)
Dept: LAB | Facility: CLINIC | Age: 85
End: 2025-08-13

## 2025-08-13 ENCOUNTER — LAB (OUTPATIENT)
Dept: LAB | Facility: CLINIC | Age: 85
End: 2025-08-13
Payer: MEDICARE

## 2025-08-13 DIAGNOSIS — R35.0 URINARY FREQUENCY: ICD-10-CM

## 2025-08-13 DIAGNOSIS — R35.0 URINARY FREQUENCY: Primary | ICD-10-CM

## 2025-08-13 LAB
ALBUMIN UR-MCNC: NEGATIVE MG/DL
APPEARANCE UR: ABNORMAL
BACTERIA #/AREA URNS HPF: ABNORMAL /HPF
BILIRUB UR QL STRIP: NEGATIVE
COLOR UR AUTO: YELLOW
GLUCOSE UR STRIP-MCNC: NEGATIVE MG/DL
HGB UR QL STRIP: ABNORMAL
KETONES UR STRIP-MCNC: NEGATIVE MG/DL
LEUKOCYTE ESTERASE UR QL STRIP: ABNORMAL
NITRATE UR QL: NEGATIVE
PH UR STRIP: 6 [PH] (ref 5–7)
RBC #/AREA URNS AUTO: ABNORMAL /HPF
SP GR UR STRIP: 1.01 (ref 1–1.03)
UROBILINOGEN UR STRIP-ACNC: 0.2 E.U./DL
WBC #/AREA URNS AUTO: ABNORMAL /HPF
WBC CLUMPS #/AREA URNS HPF: PRESENT /HPF

## 2025-08-13 PROCEDURE — 87088 URINE BACTERIA CULTURE: CPT

## 2025-08-13 PROCEDURE — 87186 SC STD MICRODIL/AGAR DIL: CPT

## 2025-08-13 PROCEDURE — 87086 URINE CULTURE/COLONY COUNT: CPT

## 2025-08-13 PROCEDURE — 81001 URINALYSIS AUTO W/SCOPE: CPT

## 2025-08-14 ENCOUNTER — OFFICE VISIT (OUTPATIENT)
Dept: FAMILY MEDICINE | Facility: CLINIC | Age: 85
End: 2025-08-14
Payer: MEDICARE

## 2025-08-14 VITALS
SYSTOLIC BLOOD PRESSURE: 135 MMHG | WEIGHT: 141 LBS | TEMPERATURE: 98.4 F | DIASTOLIC BLOOD PRESSURE: 76 MMHG | RESPIRATION RATE: 19 BRPM | HEIGHT: 60 IN | BODY MASS INDEX: 27.68 KG/M2 | HEART RATE: 78 BPM | OXYGEN SATURATION: 99 %

## 2025-08-14 DIAGNOSIS — I10 BENIGN ESSENTIAL HYPERTENSION: ICD-10-CM

## 2025-08-14 DIAGNOSIS — I25.10 CORONARY ARTERY DISEASE INVOLVING NATIVE CORONARY ARTERY OF NATIVE HEART WITHOUT ANGINA PECTORIS: ICD-10-CM

## 2025-08-14 DIAGNOSIS — Z87.440 HISTORY OF BLADDER INFECTIONS: ICD-10-CM

## 2025-08-14 DIAGNOSIS — Z98.890 STATUS POST CORONARY ANGIOGRAM: ICD-10-CM

## 2025-08-14 DIAGNOSIS — Z09 HOSPITAL DISCHARGE FOLLOW-UP: Primary | ICD-10-CM

## 2025-08-14 LAB — BACTERIA UR CULT: ABNORMAL

## 2025-08-14 RX ORDER — NITROFURANTOIN 25; 75 MG/1; MG/1
100 CAPSULE ORAL DAILY
Qty: 5 CAPSULE | Refills: 0 | Status: SHIPPED | OUTPATIENT
Start: 2025-08-14 | End: 2025-08-14

## 2025-08-14 RX ORDER — ASPIRIN 81 MG/1
81 TABLET, COATED ORAL DAILY
COMMUNITY
Start: 2025-07-16

## 2025-08-14 RX ORDER — METHENAMINE HIPPURATE 1000 MG/1
1 TABLET ORAL 2 TIMES DAILY
Qty: 2 TABLET | Refills: 0 | Status: SHIPPED | OUTPATIENT
Start: 2025-08-14

## 2025-08-14 RX ORDER — ISOSORBIDE MONONITRATE 30 MG/1
30 TABLET, EXTENDED RELEASE ORAL DAILY
COMMUNITY
Start: 2025-06-17

## 2025-08-14 RX ORDER — TICAGRELOR 90 MG/1
90 TABLET, FILM COATED ORAL 2 TIMES DAILY
COMMUNITY
Start: 2025-07-31

## 2025-08-14 RX ORDER — ROSUVASTATIN CALCIUM 5 MG/1
2.5 TABLET, COATED ORAL DAILY
COMMUNITY
Start: 2025-06-26

## 2025-08-27 ENCOUNTER — PATIENT OUTREACH (OUTPATIENT)
Dept: CARE COORDINATION | Facility: CLINIC | Age: 85
End: 2025-08-27
Payer: MEDICARE

## (undated) DEVICE — CATH ANGIO INFINITI JL4 4FRX100CM 538420

## (undated) DEVICE — KIT HAND CONTROL ACIST 014644 AR-P54

## (undated) DEVICE — VALVE HEMOSTASIS .096" COPILOT MECH 1003331

## (undated) DEVICE — PACK HEART LEFT CUSTOM

## (undated) DEVICE — INTRO SHEATH AVANTI 4FRX23CM 504604T

## (undated) DEVICE — CATH ANGIO INFINITI 3DRC 4FRX100CM 538476

## (undated) DEVICE — MANIFOLD KIT ANGIO AUTOMATED 014613

## (undated) RX ORDER — LIDOCAINE HYDROCHLORIDE 10 MG/ML
INJECTION, SOLUTION EPIDURAL; INFILTRATION; INTRACAUDAL; PERINEURAL
Status: DISPENSED
Start: 2021-05-28

## (undated) RX ORDER — FENTANYL CITRATE 50 UG/ML
INJECTION, SOLUTION INTRAMUSCULAR; INTRAVENOUS
Status: DISPENSED
Start: 2021-05-28

## (undated) RX ORDER — NITROGLYCERIN 5 MG/ML
VIAL (ML) INTRAVENOUS
Status: DISPENSED
Start: 2021-05-28

## (undated) RX ORDER — HEPARIN SODIUM 1000 [USP'U]/ML
INJECTION, SOLUTION INTRAVENOUS; SUBCUTANEOUS
Status: DISPENSED
Start: 2021-05-28

## (undated) RX ORDER — IVABRADINE 5 MG/1
TABLET, FILM COATED ORAL
Status: DISPENSED
Start: 2021-05-12

## (undated) RX ORDER — SODIUM CHLORIDE 9 MG/ML
INJECTION, SOLUTION INTRAVENOUS
Status: DISPENSED
Start: 2021-05-28

## (undated) RX ORDER — METOPROLOL TARTRATE 50 MG
TABLET ORAL
Status: DISPENSED
Start: 2021-05-12

## (undated) RX ORDER — NITROGLYCERIN 0.4 MG/1
TABLET SUBLINGUAL
Status: DISPENSED
Start: 2021-05-12